# Patient Record
Sex: MALE | Race: WHITE | Employment: OTHER | URBAN - METROPOLITAN AREA
[De-identification: names, ages, dates, MRNs, and addresses within clinical notes are randomized per-mention and may not be internally consistent; named-entity substitution may affect disease eponyms.]

---

## 2017-02-14 ENCOUNTER — TRANSFERRED RECORDS (OUTPATIENT)
Dept: HEALTH INFORMATION MANAGEMENT | Facility: CLINIC | Age: 57
End: 2017-02-14

## 2017-02-15 ENCOUNTER — TRANSFERRED RECORDS (OUTPATIENT)
Dept: HEALTH INFORMATION MANAGEMENT | Facility: CLINIC | Age: 57
End: 2017-02-15

## 2017-02-16 ENCOUNTER — TRANSFERRED RECORDS (OUTPATIENT)
Dept: HEALTH INFORMATION MANAGEMENT | Facility: CLINIC | Age: 57
End: 2017-02-16

## 2017-02-16 ENCOUNTER — HOSPITAL ENCOUNTER (INPATIENT)
Facility: CLINIC | Age: 57
LOS: 3 days | Discharge: HOME | DRG: 442 | End: 2017-02-19
Attending: INTERNAL MEDICINE | Admitting: INTERNAL MEDICINE
Payer: MEDICARE

## 2017-02-16 DIAGNOSIS — K76.82 HEPATIC ENCEPHALOPATHY (H): Primary | ICD-10-CM

## 2017-02-16 PROCEDURE — A9270 NON-COVERED ITEM OR SERVICE: HCPCS | Mod: GY | Performed by: INTERNAL MEDICINE

## 2017-02-16 PROCEDURE — 99238 HOSP IP/OBS DSCHRG MGMT 30/<: CPT | Performed by: INTERNAL MEDICINE

## 2017-02-16 PROCEDURE — 21000000 ZZH R&B IMCU HEART CARE

## 2017-02-16 PROCEDURE — 40000915 ZZH STATISTIC SITTER, EVENING HOURS

## 2017-02-16 PROCEDURE — 25900017 H RX MED GY IP 259 OP 259 PS 637: Mod: GY | Performed by: INTERNAL MEDICINE

## 2017-02-16 PROCEDURE — 99223 1ST HOSP IP/OBS HIGH 75: CPT | Mod: AI | Performed by: INTERNAL MEDICINE

## 2017-02-16 PROCEDURE — 25000132 ZZH RX MED GY IP 250 OP 250 PS 637: Mod: GY | Performed by: INTERNAL MEDICINE

## 2017-02-16 RX ORDER — ONDANSETRON 2 MG/ML
4 INJECTION INTRAMUSCULAR; INTRAVENOUS EVERY 6 HOURS PRN
Status: DISCONTINUED | OUTPATIENT
Start: 2017-02-16 | End: 2017-02-19 | Stop reason: HOSPADM

## 2017-02-16 RX ORDER — NALOXONE HYDROCHLORIDE 0.4 MG/ML
.1-.4 INJECTION, SOLUTION INTRAMUSCULAR; INTRAVENOUS; SUBCUTANEOUS
Status: DISCONTINUED | OUTPATIENT
Start: 2017-02-16 | End: 2017-02-19 | Stop reason: HOSPADM

## 2017-02-16 RX ORDER — CLOTRIMAZOLE 1 %
CREAM (GRAM) TOPICAL 2 TIMES DAILY
Status: DISCONTINUED | OUTPATIENT
Start: 2017-02-16 | End: 2017-02-19 | Stop reason: HOSPADM

## 2017-02-16 RX ORDER — TRIAMTERENE CAPSULES 100 MG/1
100 CAPSULE ORAL DAILY
Status: DISCONTINUED | OUTPATIENT
Start: 2017-02-16 | End: 2017-02-16

## 2017-02-16 RX ORDER — POTASSIUM CHLORIDE 1500 MG/1
20 TABLET, EXTENDED RELEASE ORAL DAILY
Status: DISCONTINUED | OUTPATIENT
Start: 2017-02-16 | End: 2017-02-19 | Stop reason: HOSPADM

## 2017-02-16 RX ORDER — CEFTRIAXONE 1 G/1
1 INJECTION, POWDER, FOR SOLUTION INTRAMUSCULAR; INTRAVENOUS EVERY 24 HOURS
Status: DISCONTINUED | OUTPATIENT
Start: 2017-02-17 | End: 2017-02-19 | Stop reason: HOSPADM

## 2017-02-16 RX ORDER — BUMETANIDE 1 MG/1
1 TABLET ORAL 2 TIMES DAILY
Status: DISCONTINUED | OUTPATIENT
Start: 2017-02-16 | End: 2017-02-19 | Stop reason: HOSPADM

## 2017-02-16 RX ORDER — ESCITALOPRAM OXALATE 20 MG/1
20 TABLET ORAL DAILY
Status: DISCONTINUED | OUTPATIENT
Start: 2017-02-16 | End: 2017-02-19 | Stop reason: HOSPADM

## 2017-02-16 RX ORDER — LACTULOSE 10 G/15ML
20 SOLUTION ORAL 2 TIMES DAILY
COMMUNITY
End: 2020-03-04

## 2017-02-16 RX ORDER — AMILORIDE HYDROCHLORIDE 5 MG/1
10 TABLET ORAL 2 TIMES DAILY
Status: DISCONTINUED | OUTPATIENT
Start: 2017-02-16 | End: 2017-02-19 | Stop reason: HOSPADM

## 2017-02-16 RX ORDER — ESCITALOPRAM OXALATE 10 MG/1
10 TABLET ORAL DAILY
Status: DISCONTINUED | OUTPATIENT
Start: 2017-02-16 | End: 2017-02-16 | Stop reason: CLARIF

## 2017-02-16 RX ORDER — BUMETANIDE 1 MG/1
1 TABLET ORAL DAILY
Status: DISCONTINUED | OUTPATIENT
Start: 2017-02-16 | End: 2017-02-16 | Stop reason: CLARIF

## 2017-02-16 RX ORDER — ONDANSETRON 4 MG/1
4 TABLET, ORALLY DISINTEGRATING ORAL EVERY 6 HOURS PRN
Status: DISCONTINUED | OUTPATIENT
Start: 2017-02-16 | End: 2017-02-19 | Stop reason: HOSPADM

## 2017-02-16 RX ORDER — LACTULOSE 10 G/15ML
30 SOLUTION ORAL 4 TIMES DAILY
Status: DISCONTINUED | OUTPATIENT
Start: 2017-02-16 | End: 2017-02-19 | Stop reason: HOSPADM

## 2017-02-16 RX ADMIN — ESCITALOPRAM 20 MG: 20 TABLET, FILM COATED ORAL at 21:25

## 2017-02-16 RX ADMIN — POTASSIUM CHLORIDE 20 MEQ: 1500 TABLET, EXTENDED RELEASE ORAL at 21:25

## 2017-02-16 RX ADMIN — OMEPRAZOLE 20 MG: 20 CAPSULE, DELAYED RELEASE ORAL at 21:25

## 2017-02-16 RX ADMIN — LACTULOSE 30 G: 10 SOLUTION ORAL at 21:21

## 2017-02-16 RX ADMIN — BUMETANIDE 1 MG: 1 TABLET ORAL at 21:25

## 2017-02-16 ASSESSMENT — ACTIVITIES OF DAILY LIVING (ADL)
TOILETING: 0-->INDEPENDENT
SWALLOWING: 0-->SWALLOWS FOODS/LIQUIDS WITHOUT DIFFICULTY
FALL_HISTORY_WITHIN_LAST_SIX_MONTHS: NO
BATHING: 0-->INDEPENDENT
RETIRED_COMMUNICATION: 0-->UNDERSTANDS/COMMUNICATES WITHOUT DIFFICULTY
AMBULATION: 0-->INDEPENDENT
COGNITION: 0 - NO COGNITION ISSUES REPORTED
TRANSFERRING: 0-->INDEPENDENT
RETIRED_EATING: 0-->INDEPENDENT
DRESS: 0-->INDEPENDENT
NUMBER_OF_TIMES_PATIENT_HAS_FALLEN_WITHIN_LAST_SIX_MONTHS: 0

## 2017-02-16 NOTE — IP AVS SNAPSHOT
MRN:[id number]                      After Visit Summary   2/16/2017    Ravindra Fregoso    MRN: [id number]           Thank you!     Thank you for choosing [facility] for your care. Our goal is always to provide you with excellent care. Hearing back from our patients is one way we can continue to improve our services. Please take a few minutes to complete the written survey that you may receive in the mail after you visit with us. Thank you!        Patient Information     Date Of Birth          1960        About your hospital stay     You were admitted on:  February 16, 2017 You last received care in the:  [place] [address] [facility]    You were discharged on:  February 19, 2017        Reason for your hospital stay       Hepatic encephalopathy secondary to liver cirrhosis with mental status changes. Resolved with treatment.                  Who to Call     For medical emergencies, please call 911.  For non-urgent questions about your medical care, please call your primary care provider or clinic, [phone]          Attending Provider     Provider Specialty    Liban Whitaker MD Internal Medicine    [facility], Hammad GARRETT MD Internal Medicine       Primary Care Provider Office Phone # Fax #    Moo Flores [phone]       [facility] [address]   [place] [facility] [address]        After Care Instructions     Activity       Your activity upon discharge: activity as tolerated            Diet       Follow this diet upon discharge: Orders Placed This Encounter      2 Gram Sodium Diet            Monitor and record       weight every day                  Follow-up Appointments     Follow-up and recommended labs and tests        Follow up with primary care provider, MOO FLORES, within 7 days for hospital follow- up.  The following labs/tests are recommended: CBC,CMP,Ammonia.                  Your next 10 appointments already scheduled     Jun 12, 2017  7:00 AM CDT  "  Lab with  LAB   [facility] ([facility])    [address]  1st Floor  [facility] [id number] [phone]            2017  8:00 AM CDT   (Arrive by 7:45 AM)   Return General Liver with Rich Wong MD   [facility] ([facility])    [address]  3rd Floor  [facility] [id number] [phone]              Pending Results     No orders found from 2017 to 2017.            Statement of Approval     Ordered          17 1653  I have reviewed and agree with all the recommendations and orders detailed in this document.  EFFECTIVE NOW     Approved and electronically signed by:  Redd Vieyra MD             Admission Information     Date & Time Provider Department Dept. Phone    2017 Hammad Barber MD [place] [address] [facility] [phone]      Your Vitals Were     Blood Pressure Pulse Temperature Respirations Weight Pulse Oximetry    133/80 (BP Location: Left arm) 65 97.5  F (36.4  C) (Oral) 16 67.6 kg (149 lb 0.5 oz) 97%    BMI (Body Mass Index)                   25.57 kg/m2           MyChart Information     [employer] lets you send messages to your doctor, view your test results, renew your prescriptions, schedule appointments and more. To sign up, go to [email or web address] . Click on \"Log in\" on the left side of the screen, which will take you to the Welcome page. Then click on \"Sign up Now\" on the right side of the page.     You will be asked to enter the access code listed below, as well as some personal information. Please follow the directions to create your username and password.     Your access code is: [email or web address]  Expires: 2017  5:13 PM     Your access code will  in 90 days. If you need help or a new code, please call your [facility] or [phone].        Care EveryWhere ID     This is your Care EveryWhere ID. This could be used by other " organizations to access your [facility] medical records  [id number]           Review of your medicines      START taking        Dose / Directions    rifaximin 550 MG Tabs tablet   Commonly known as:  XIFAXAN   Indication:  Infection Within the Abdomen   Used for:  Hepatic encephalopathy (H)        Dose:  550 mg   Take 1 tablet (550 mg) by mouth 2 times daily   Quantity:  60 tablet   Refills:  0         CONTINUE these medicines which have NOT CHANGED        Dose / Directions    AMILORIDE HCL PO        Dose:  10 mg   Take 10 mg by mouth 2 times daily   Refills:  0       amoxicillin 500 MG capsule   Commonly known as:  AMOXIL   Used for:  Alcoholic cirrhosis of liver with ascites (H)        Dose:  2000 mg   Take 4 capsules (2,000 mg) by mouth See Admin Instructions   Quantity:  4 capsule   Refills:  5       BUMETANIDE PO        Dose:  1 mg   Take 1 mg by mouth 2 times daily   Refills:  0       ESCITALOPRAM OXALATE PO        Dose:  20 mg   Take 20 mg by mouth daily   Refills:  0       FOSAMAX 70 MG tablet   Generic drug:  alendronate        Dose:  70 mg   Take 70 mg by mouth every 7 days   Refills:  0       lactulose 10 GM/15ML solution   Commonly known as:  CHRONULAC        Dose:  20 g   Take 20 g by mouth 2 times daily   Refills:  0       MULTIVITAL Tabs        Dose:  1 tablet   Take 1 tablet by mouth   Quantity:  30 tablet   Refills:  0       omeprazole 20 MG CR capsule   Commonly known as:  priLOSEC   Used for:  Gastroesophageal reflux disease, esophagitis presence not specified        Dose:  20 mg   Take 1 capsule (20 mg) by mouth daily   Quantity:  90 capsule   Refills:  1       potassium chloride gillian er   Commonly known as:  K-DUR        Dose:  20 mEq   Take 20 mEq by mouth daily   Refills:  0            Where to get your medicines      These medications were sent to [id number] - [place], [place] - [address]  [address] [place], [place] [address] [address]     Phone:  [phone]     rifaximin 550 MG Tabs  tablet                Protect others around you: Learn how to safely use, store and throw away your medicines at www.disposemymeds.org.             Medication List: This is a list of all your medications and when to take them. Check marks below indicate your daily home schedule. Keep this list as a reference.      Medications           Morning Afternoon Evening Bedtime As Needed    AMILORIDE HCL PO   Take 10 mg by mouth 2 times daily   Last time this was given:  10 mg on 2/19/2017  8:34 AM                                amoxicillin 500 MG capsule   Commonly known as:  AMOXIL   Take 4 capsules (2,000 mg) by mouth See Admin Instructions                                BUMETANIDE PO   Take 1 mg by mouth 2 times daily   Last time this was given:  1 mg on 2/19/2017  8:34 AM                                ESCITALOPRAM OXALATE PO   Take 20 mg by mouth daily   Last time this was given:  20 mg on 2/19/2017  8:34 AM                                FOSAMAX 70 MG tablet   Take 70 mg by mouth every 7 days   Generic drug:  alendronate                                lactulose 10 GM/15ML solution   Commonly known as:  CHRONULAC   Take 20 g by mouth 2 times daily   Last time this was given:  30 g on 2/19/2017 12:39 PM                                MULTIVITAL Tabs   Take 1 tablet by mouth                                omeprazole 20 MG CR capsule   Commonly known as:  priLOSEC   Take 1 capsule (20 mg) by mouth daily   Last time this was given:  20 mg on 2/19/2017  8:33 AM                                potassium chloride gillian er   Commonly known as:  K-DUR   Take 20 mEq by mouth daily   Last time this was given:  20 mEq on 2/19/2017  8:34 AM                                rifaximin 550 MG Tabs tablet   Commonly known as:  XIFAXAN   Take 1 tablet (550 mg) by mouth 2 times daily   Last time this was given:  550 mg on 2/19/2017  8:34 AM

## 2017-02-16 NOTE — IP AVS SNAPSHOT
[place] [address] [facility]    [address] [place] [address]    Phone:  [phone]                                       After Visit Summary   2/16/2017    Ravindra Fregoso    MRN: [id number]           After Visit Summary Signature Page     I have received my discharge instructions, and my questions have been answered. I have discussed any challenges I see with this plan with the nurse or doctor.    ..........................................................................................................................................  Patient/Patient Representative Signature      ..........................................................................................................................................  Patient Representative Print Name and Relationship to Patient    ..................................................               ................................................  Date                                            Time    ..........................................................................................................................................  Reviewed by Signature/Title    ...................................................              ..............................................  Date                                                            Time

## 2017-02-17 LAB
ALBUMIN SERPL-MCNC: 3.2 G/DL (ref 3.4–5)
ALP SERPL-CCNC: 101 U/L (ref 40–150)
ALT SERPL W P-5'-P-CCNC: 35 U/L (ref 0–70)
AMMONIA PLAS-SCNC: 75 UMOL/L (ref 10–50)
ANION GAP SERPL CALCULATED.3IONS-SCNC: 10 MMOL/L (ref 3–14)
AST SERPL W P-5'-P-CCNC: 34 U/L (ref 0–45)
BILIRUB DIRECT SERPL-MCNC: 0.7 MG/DL (ref 0–0.2)
BILIRUB SERPL-MCNC: 2.1 MG/DL (ref 0.2–1.3)
BUN SERPL-MCNC: 23 MG/DL (ref 7–30)
C DIFF TOX B STL QL: NORMAL
CALCIUM SERPL-MCNC: 8.3 MG/DL (ref 8.5–10.1)
CHLORIDE SERPL-SCNC: 115 MMOL/L (ref 94–109)
CO2 SERPL-SCNC: 22 MMOL/L (ref 20–32)
CREAT SERPL-MCNC: 1.07 MG/DL (ref 0.66–1.25)
ERYTHROCYTE [DISTWIDTH] IN BLOOD BY AUTOMATED COUNT: 15 % (ref 10–15)
GFR SERPL CREATININE-BSD FRML MDRD: 71 ML/MIN/1.7M2
GLUCOSE SERPL-MCNC: 121 MG/DL (ref 70–99)
HCT VFR BLD AUTO: 35 % (ref 40–53)
HGB BLD-MCNC: 12.5 G/DL (ref 13.3–17.7)
INR PPP: 1.36 (ref 0.86–1.14)
MCH RBC QN AUTO: 34.5 PG (ref 26.5–33)
MCHC RBC AUTO-ENTMCNC: 35.7 G/DL (ref 31.5–36.5)
MCV RBC AUTO: 97 FL (ref 78–100)
PLATELET # BLD AUTO: 113 10E9/L (ref 150–450)
POTASSIUM SERPL-SCNC: 4.2 MMOL/L (ref 3.4–5.3)
PROT SERPL-MCNC: 7.2 G/DL (ref 6.8–8.8)
RBC # BLD AUTO: 3.62 10E12/L (ref 4.4–5.9)
SODIUM SERPL-SCNC: 147 MMOL/L (ref 133–144)
SPECIMEN SOURCE: NORMAL
WBC # BLD AUTO: 7.5 10E9/L (ref 4–11)

## 2017-02-17 PROCEDURE — 40000916 ZZH STATISTIC SITTER, NIGHT HOURS

## 2017-02-17 PROCEDURE — 25000132 ZZH RX MED GY IP 250 OP 250 PS 637: Mod: GY | Performed by: INTERNAL MEDICINE

## 2017-02-17 PROCEDURE — A9270 NON-COVERED ITEM OR SERVICE: HCPCS | Mod: GY | Performed by: INTERNAL MEDICINE

## 2017-02-17 PROCEDURE — 40000915 ZZH STATISTIC SITTER, EVENING HOURS

## 2017-02-17 PROCEDURE — 27210995 ZZH RX 272: Performed by: INTERNAL MEDICINE

## 2017-02-17 PROCEDURE — 40000914 ZZH STATISTIC SITTER, DAY HOURS

## 2017-02-17 PROCEDURE — 85610 PROTHROMBIN TIME: CPT | Performed by: INTERNAL MEDICINE

## 2017-02-17 PROCEDURE — 25000125 ZZHC RX 250: Performed by: INTERNAL MEDICINE

## 2017-02-17 PROCEDURE — 25900017 H RX MED GY IP 259 OP 259 PS 637: Mod: GY | Performed by: INTERNAL MEDICINE

## 2017-02-17 PROCEDURE — 25000128 H RX IP 250 OP 636: Performed by: HOSPITALIST

## 2017-02-17 PROCEDURE — 82140 ASSAY OF AMMONIA: CPT | Performed by: INTERNAL MEDICINE

## 2017-02-17 PROCEDURE — 80076 HEPATIC FUNCTION PANEL: CPT | Performed by: INTERNAL MEDICINE

## 2017-02-17 PROCEDURE — 99233 SBSQ HOSP IP/OBS HIGH 50: CPT | Performed by: INTERNAL MEDICINE

## 2017-02-17 PROCEDURE — 36415 COLL VENOUS BLD VENIPUNCTURE: CPT | Performed by: INTERNAL MEDICINE

## 2017-02-17 PROCEDURE — 85027 COMPLETE CBC AUTOMATED: CPT | Performed by: INTERNAL MEDICINE

## 2017-02-17 PROCEDURE — 12000000 ZZH R&B MED SURG/OB

## 2017-02-17 PROCEDURE — 25000128 H RX IP 250 OP 636: Performed by: INTERNAL MEDICINE

## 2017-02-17 PROCEDURE — 80048 BASIC METABOLIC PNL TOTAL CA: CPT | Performed by: INTERNAL MEDICINE

## 2017-02-17 PROCEDURE — 87493 C DIFF AMPLIFIED PROBE: CPT | Performed by: INTERNAL MEDICINE

## 2017-02-17 RX ORDER — LORAZEPAM 2 MG/ML
2 INJECTION INTRAMUSCULAR ONCE
Status: COMPLETED | OUTPATIENT
Start: 2017-02-17 | End: 2017-02-17

## 2017-02-17 RX ADMIN — BUMETANIDE 1 MG: 1 TABLET ORAL at 10:15

## 2017-02-17 RX ADMIN — CLOTRIMAZOLE: 1 CREAM TOPICAL at 22:41

## 2017-02-17 RX ADMIN — LACTULOSE 30 G: 10 SOLUTION ORAL at 13:00

## 2017-02-17 RX ADMIN — AMILORIDE HYDROCHLORIDE 10 MG: 5 TABLET ORAL at 12:44

## 2017-02-17 RX ADMIN — POTASSIUM CHLORIDE 20 MEQ: 1500 TABLET, EXTENDED RELEASE ORAL at 10:16

## 2017-02-17 RX ADMIN — LACTULOSE 30 G: 10 SOLUTION ORAL at 18:07

## 2017-02-17 RX ADMIN — LORAZEPAM 2 MG: 2 INJECTION INTRAMUSCULAR; INTRAVENOUS at 01:49

## 2017-02-17 RX ADMIN — LACTULOSE 30 G: 10 SOLUTION ORAL at 10:16

## 2017-02-17 RX ADMIN — CLOTRIMAZOLE: 1 CREAM TOPICAL at 10:16

## 2017-02-17 RX ADMIN — BUMETANIDE 1 MG: 1 TABLET ORAL at 20:48

## 2017-02-17 RX ADMIN — OMEPRAZOLE 20 MG: 20 CAPSULE, DELAYED RELEASE ORAL at 10:15

## 2017-02-17 RX ADMIN — LACTULOSE 30 G: 10 SOLUTION ORAL at 21:04

## 2017-02-17 RX ADMIN — AMILORIDE HYDROCHLORIDE 10 MG: 5 TABLET ORAL at 20:47

## 2017-02-17 RX ADMIN — ESCITALOPRAM 20 MG: 20 TABLET, FILM COATED ORAL at 10:15

## 2017-02-17 RX ADMIN — CEFTRIAXONE 1 G: 1 INJECTION, POWDER, FOR SOLUTION INTRAMUSCULAR; INTRAVENOUS at 12:33

## 2017-02-17 RX ADMIN — RIFAXIMIN 550 MG: 550 TABLET ORAL at 20:48

## 2017-02-17 NOTE — PROGRESS NOTES
[facility]    Hospitalist Progress Note    Date of Admission:  2/16/2017  Date of Service: 02/17/2017    Assessment & Plan   Ravindra Fregoso is a 56-year-old gentleman who presents with confusion, hepatic encephalopathy with elevated ammonia level who has a history of alcohol-related cirrhosis with varices, admitted now with decompensated alcoholic liver disease in the setting of hepatic encephalopathy, noncompliance with respect to his alcohol intake, transferred from [facility] to [facility] for further treatment.      Hepatic encephalopathy secondary to decompensated alcoholic liver cirrhosis with ascites  The patient is followed by Dr. Sidney Wong, hepatologist at [facility].  The patient recently was taken off his rifaximin due to stability of his encephalopathy.  * INR stable at 1.36  * Ammonia 126 at OSH -> 75  * Head CT at OSH neg  - Continue IV ceftriaxone for question of SBP, although appears less likely  - GI consult   - oral lactulose 4 times a day (rectal if needed)  - Consideration per GI if rifaximin should be started again  - Continue PTA amiloride and Bumex   - May transfer to medical floor  - Abdominal US    Alcohol dependency  Apparently drinks once every couple of weeks.  No current signs of withdrawal.  - Stressed importance of sobriety    Hypertension   - Continue PTA amiloride and Bumex    History of depression.    - Continue PTA Lexapro.     DVT Prophylaxis: Pneumatic Compression Devices  Code Status: Full Code  Disposition: Expected discharge in 1-3 days pending improvement in mentation    Hammad Barber MD  Hospitalist  [phone] (P)  Text Page (7 am to 6 pm)    Interval History   He has had 3 stools since admission while taking lactulose.  Still confused, but more awake now. 1:1 sitter in room.  -Data reviewed today: I reviewed all new labs and imaging results over the last 24 hours. I personally reviewed no images or EKG's today.    Physical Exam   /86 (BP Location:  Left arm)  Temp 97.2  F (36.2  C) (Axillary)  Resp 16  Wt 62.4 kg (137 lb 9.1 oz)  SpO2 96%  BMI 23.6 kg/m2  Constitutional: NAD, lethargic  HEENT: EOMI   Cardiovascular: S1, S2, regular rhythm  Respiratory: CTAB, no crackles  Gastrointestinal: Soft, mildly distended, non-tender  Neurologic: No focal deficits, oriented to general place and self only    Medications        omeprazole  20 mg Oral Daily     lactulose 200 g rectal enema  200 g Rectal Q4H     lactulose  30 g Oral 4x Daily     cefTRIAXone  1 g Intravenous Q24H     clotrimazole   Topical BID     aMILoride (MIDAMOR) tablet 10 mg  10 mg Oral BID     bumetanide (BUMEX) tablet 1 mg  1 mg Oral BID     escitalopram (LEXAPRO) tablet 20 mg  20 mg Oral Daily     potassium chloride gillian er  20 mEq Oral Daily       Data     Recent Labs  Lab 02/17/17  0515   WBC 7.5   HGB 12.5*   MCV 97   *   INR 1.36*   *   POTASSIUM 4.2   CHLORIDE 115*   CO2 22   BUN 23   CR 1.07   ANIONGAP 10   NADJA 8.3*   *   ALBUMIN 3.2*   PROTTOTAL 7.2   BILITOTAL 2.1*   ALKPHOS 101   ALT 35   AST 34     No results found for this or any previous visit (from the past 24 hour(s)).

## 2017-02-17 NOTE — PLAN OF CARE
Problem: Goal Outcome Summary  Goal: Goal Outcome Summary  Outcome: No Change  Pt transferred this evening from [facility] for AMS and ammonia level of 126. VSS. Tele shows SR. Pt denies any chest pain or SOB. He is alert to self only. MD to see pt and complete admission. Bed alarm on. Wife at bedside. Will continue to monitor pt.

## 2017-02-17 NOTE — H&P
DATE OF ADMISSION:  02/16/2017      PRIMARY CARE PROVIDER:  Moo Flores MD      PRIMARY GASTROENTEROLOGIST AND HEPATIC SPECIALIST:  Rich Wong MD       CHIEF COMPLAINT:  Confusion.      HISTORY:  Ravindra Fregoso is a 56-year-old  male with history of alcohol-related cirrhosis, ascites and prior history of hepatic encephalopathy who continues to consume alcohol, albeit intermittently, last one being about a week ago, who also has a history of grade 1 varices, portal hypertensive gastropathy, followed by hepatologist, Dr. Sidney Wong from the [facility], was at [facility] earlier today.  The patient has had progressively increasing ammonia levels over the last few days.  Two days ago his ammonia level was apparently 56.  His lactulose dose was increased.  Nonetheless, the patient presented with confusion at the hospital in [place] where his ammonia was 126.  The patient was seen by a Dr. Messer there.  He had a lactic acid of 1.8.  Urine tox was negative.  Alcohol level was negative.  Glucose is 153.  Lytes and LFTs were normal.  Total bilirubin was 1.6.  AST, ALT, alkaline phosphatase are all normal.  He had a normal amylase and lipase as well.  His white count was 7.5, hemoglobin 12.2.  INR was 1.22.  The patient is not on anticoagulation.  He had a head CT which showed only mild atrophy.  The patient was given 2 mg of Ativan for sedation and prior to his head CT he also received 25 grams of albumin as well as 1 gram of IV ceftriaxone for possible SBP.  He was able to be awake enough to drink his lactulose.  He presented to [facility] with no bowel movement today and confused with his wife at bedside.  The patient is unable to really provide much history.      The patient is followed by Dr. Sidney Wong.  He was last seen in 12/2016 when he was asked to continue his Bumex and triamterene and was tapered off of rifaximin due to cost and  overall had not had any recent encephalopathy.      PAST MEDICAL HISTORY:   1.  Alcohol-related cirrhosis with grade 1 varices and portal hypertensive gastropathy.   2.  Osteoporosis.   3.  Chronic pancreatitis with ongoing alcohol abuse.   4.  Depression.   5.  Has significant umbilical hernia.       PAST SURGICAL HISTORY:   1.  Tonsillectomy.     2.  Inguinal hernia surgery.      SOCIAL HISTORY:  He is .  He has 3 children.  Ongoing alcohol use intermittently.  Retired.  His wife accompanies him.      ALLERGIES:  Atorvastatin.      CURRENT MEDICATIONS:   1.  Lexapro 20 mg once daily.   2.  Fosamax 70 mg once a week.   3.  Bumex 1 mg b.i.d.   4.  Omeprazole 20 mg daily.   5.  Potassium chloride 10 mEq twice a day, take 2 tablets or 20 mg a day.   6.  Amlodipine 10 mg b.i.d.   7.  Amoxicillin 2 grams before dental procedures.   8.  Rifaximin was discontinued recently in 12/2016.   9.  Multivitamin.      REVIEW OF SYSTEMS:  A 10-point review of systems were not reviewed due to patient's encephalopathy.      PHYSICAL EXAMINATION:   VITAL SIGNS:  Temperature 98 degrees, heart rate 60, respirations 16, blood pressure 140/65, sats 98% on room air.   GENERAL:  The patient is a 56-year-old gentleman who is disheveled, encephalopathic and does not follow commands.   HEENT:  He has numerous cuts on his face.  Sclerae are anicteric.  Mucous membranes are tacky.  No jaundice.   NECK  No JVP elevation.   LUNGS:  Clear to auscultation.   CARDIOVASCULAR:  S1, S2, regular rate and rhythm.   ABDOMEN:  Distended.  He has an umbilical hernia.  He has ascites.  Did not have any tenderness to palpation.   EXTREMITIES:  Shows no cyanosis.  He does have numerous scratches and some erythematous areas over pressure points.  He has potential small ringworm above his knee on his right leg.   NEUROLOGIC:  He is encephalopathic and does not follow commands, but moves all 4 extremities.  Cranial nerves appear to be grossly intact.  He  does follow commands intermittently but overall somnolent.      ASSESSMENT:  Christin Mccord is a 56-year-old gentleman who presents with confusion, hepatic encephalopathy with elevated ammonia level who has a history of alcohol-related cirrhosis, admitted now with decompensated alcoholic liver disease in the setting of hepatic encephalopathy, noncompliance with respect to his alcohol intake being admitted for further treatment.      PLAN:   1.  Decompensated alcoholic-related liver cirrhosis.  The patient in addition to his ongoing alcohol use could have SBP.  The patient will be treated as such.  He received IV ceftriaxone.  We will continue this.  Will obtain a formal GI consultation.  With respect to his confusion, the patient will receive lactulose 200 mg every 4 hours rectally if the patient is unable to take oral lactulose 4 times a day.  The patient is followed by Dr. Sidney Wong who is a hepatologist at the [facility].  The patient recently was taken off his rifaximin due to stability of his encephalopathy.  However, this may need to be reinstituted again given his decompensation.  Will defer to our gastroenterologist or with his primary gastroenterologist.   2.  Alcoholic liver disease with ascites.  The patient will be continued on his Bumex once we have the dosage verified as well as potassium chloride.  He was previously on triamterene.  Will see if he was continued on that once we get his home medication list.   3.  Hypertension.  The patient will be continued on his amlodipine.   4.  History of depression.  Will continue patient on Lexapro.   5.  DVT prophylaxis:  The patient will have compression boots.      CODE STATUS:  Full.         KATHY CAVANAUGH MD             D: 2017 20:40   T: 2017 23:43   MT: mg      Name:     CHRISTIN MCCORD   MRN:      [id number]        Account:      [id number]   :      1960           Admitted:     [id number]       Document: [id number]       cc: Rich Flores MD

## 2017-02-17 NOTE — PROVIDER NOTIFICATION
MD Notification    Notified Person:  MD    Notified Persons Name:    Notification Date/Time:0145 2/17/17    Notification Interaction:  Talked with Physician    Purpose of Notification: Pt with increasing restlessness, pulling at lines, and mild agitation. Requesting ativan.    Orders Received: 2mg ativan IV one time dose received.    Comments:

## 2017-02-17 NOTE — PROVIDER NOTIFICATION
MD Notification    Notified Person:  MD    Notified Persons Name: Vivian    Notification Date/Time:2/17/17 8466    Notification Interaction:  Talked with Physician    Purpose of Notification: Pt on lactulose and has not had a BM.    Orders Received: Continue to monitor. If no BM by 0900; ok to give lactulose enema.    Comments:

## 2017-02-17 NOTE — PHARMACY-ADMISSION MEDICATION HISTORY
Admission Medication History    Admission medication history interview status for the 2/16/2017 admission is complete. See EPIC admission navigator for prior to admission medications     Medication history source reliability:Good,  Spouse provided med list over the telephone    Actions taken by pharmacist (provider contacted, etc): Contacted spouse by phone      Additional medication history information not noted on PTA med list :None    Medication reconciliation/reorder completed by provider prior to medication history? No    Time spent in this activity: 15 minutes    Prior to Admission medications    Medication Sig Last Dose Taking? Auth Provider   AMILORIDE HCL PO Take 10 mg by mouth 2 times daily 2/15/2017 at Unknown time Yes Unknown, Entered By History   BUMETANIDE PO Take 1 mg by mouth 2 times daily 2/15/2017 at Unknown time Yes Unknown, Entered By History   ESCITALOPRAM OXALATE PO Take 20 mg by mouth daily 2/15/2017 at Unknown time Yes Unknown, Entered By History   lactulose (CHRONULAC) 10 GM/15ML solution Take 20 g by mouth 2 times daily 2/15/2017 at Unknown time Yes Unknown, Entered By History   potassium chloride gillian er (K-DUR) Take 20 mEq by mouth daily 2/15/2017 at Unknown time Yes Unknown, Entered By History   alendronate (FOSAMAX) 70 MG tablet Take 70 mg by mouth every 7 days 2/13/2017 at Monday Yes Reported, Patient   omeprazole (PRILOSEC) 20 MG capsule Take 1 capsule (20 mg) by mouth daily 2/15/2017 at Unknown time Yes Rich Haque MD   Multiple Vitamins-Minerals (MULTIVITAL) TABS Take 1 tablet by mouth 2/15/2017 at Unknown time Yes Rich Haque MD   amoxicillin (AMOXIL) 500 MG capsule Take 4 capsules (2,000 mg) by mouth See Admin Instructions  at [facility] Hinojosa NP David S. Cline, PharmD

## 2017-02-17 NOTE — PLAN OF CARE
Problem: Goal Outcome Summary  Goal: Goal Outcome Summary  Outcome: No Change  VSS. No c/o pain or nonverbal indicators of pain. Pt confused and lethargic/mildly obtunded throughout night. Pt extremely restless at times, pulling at lines, and attempting to remove his gown and tele monitor. Pt given prn ativan with some relief; pt calmed down but became restless again an hour later. Pt removed own IV. New Iv placed without difficulty. Pt received lactulose dose and has yet to have BM; MD notified will continue to monitor. Tele SR. Pt with likely ringworm on rt inner thigh. LS clear with sats >90% on RA.

## 2017-02-18 ENCOUNTER — APPOINTMENT (OUTPATIENT)
Dept: ULTRASOUND IMAGING | Facility: CLINIC | Age: 57
DRG: 442 | End: 2017-02-18
Attending: INTERNAL MEDICINE
Payer: MEDICARE

## 2017-02-18 LAB
ALBUMIN SERPL-MCNC: 3.3 G/DL (ref 3.4–5)
ALP SERPL-CCNC: 104 U/L (ref 40–150)
ALT SERPL W P-5'-P-CCNC: 37 U/L (ref 0–70)
AMMONIA PLAS-SCNC: 47 UMOL/L (ref 10–50)
ANION GAP SERPL CALCULATED.3IONS-SCNC: 11 MMOL/L (ref 3–14)
AST SERPL W P-5'-P-CCNC: 38 U/L (ref 0–45)
BILIRUB DIRECT SERPL-MCNC: 0.8 MG/DL (ref 0–0.2)
BILIRUB SERPL-MCNC: 2.5 MG/DL (ref 0.2–1.3)
BUN SERPL-MCNC: 25 MG/DL (ref 7–30)
CALCIUM SERPL-MCNC: 8.4 MG/DL (ref 8.5–10.1)
CHLORIDE SERPL-SCNC: 105 MMOL/L (ref 94–109)
CO2 SERPL-SCNC: 21 MMOL/L (ref 20–32)
CREAT SERPL-MCNC: 1.12 MG/DL (ref 0.66–1.25)
ERYTHROCYTE [DISTWIDTH] IN BLOOD BY AUTOMATED COUNT: 14.6 % (ref 10–15)
GFR SERPL CREATININE-BSD FRML MDRD: 67 ML/MIN/1.7M2
GLUCOSE SERPL-MCNC: 125 MG/DL (ref 70–99)
HCT VFR BLD AUTO: 36 % (ref 40–53)
HGB BLD-MCNC: 12.8 G/DL (ref 13.3–17.7)
INR PPP: 1.36 (ref 0.86–1.14)
MCH RBC QN AUTO: 34 PG (ref 26.5–33)
MCHC RBC AUTO-ENTMCNC: 35.6 G/DL (ref 31.5–36.5)
MCV RBC AUTO: 96 FL (ref 78–100)
PLATELET # BLD AUTO: 106 10E9/L (ref 150–450)
POTASSIUM SERPL-SCNC: 3.4 MMOL/L (ref 3.4–5.3)
PROT SERPL-MCNC: 7.4 G/DL (ref 6.8–8.8)
RBC # BLD AUTO: 3.76 10E12/L (ref 4.4–5.9)
SODIUM SERPL-SCNC: 137 MMOL/L (ref 133–144)
WBC # BLD AUTO: 7.1 10E9/L (ref 4–11)

## 2017-02-18 PROCEDURE — 80053 COMPREHEN METABOLIC PANEL: CPT | Performed by: INTERNAL MEDICINE

## 2017-02-18 PROCEDURE — 25000128 H RX IP 250 OP 636: Performed by: INTERNAL MEDICINE

## 2017-02-18 PROCEDURE — 36415 COLL VENOUS BLD VENIPUNCTURE: CPT | Performed by: INTERNAL MEDICINE

## 2017-02-18 PROCEDURE — A9270 NON-COVERED ITEM OR SERVICE: HCPCS | Mod: GY | Performed by: INTERNAL MEDICINE

## 2017-02-18 PROCEDURE — 85610 PROTHROMBIN TIME: CPT | Performed by: INTERNAL MEDICINE

## 2017-02-18 PROCEDURE — 25900017 H RX MED GY IP 259 OP 259 PS 637: Mod: GY | Performed by: INTERNAL MEDICINE

## 2017-02-18 PROCEDURE — 82140 ASSAY OF AMMONIA: CPT | Performed by: INTERNAL MEDICINE

## 2017-02-18 PROCEDURE — 25000132 ZZH RX MED GY IP 250 OP 250 PS 637: Mod: GY | Performed by: INTERNAL MEDICINE

## 2017-02-18 PROCEDURE — 76700 US EXAM ABDOM COMPLETE: CPT

## 2017-02-18 PROCEDURE — 85027 COMPLETE CBC AUTOMATED: CPT | Performed by: INTERNAL MEDICINE

## 2017-02-18 PROCEDURE — 99232 SBSQ HOSP IP/OBS MODERATE 35: CPT | Performed by: INTERNAL MEDICINE

## 2017-02-18 PROCEDURE — 82248 BILIRUBIN DIRECT: CPT | Performed by: INTERNAL MEDICINE

## 2017-02-18 PROCEDURE — 99207 ZZC MOONLIGHTING INDICATOR: CPT | Performed by: INTERNAL MEDICINE

## 2017-02-18 PROCEDURE — 12000000 ZZH R&B MED SURG/OB

## 2017-02-18 RX ADMIN — RIFAXIMIN 550 MG: 550 TABLET ORAL at 20:00

## 2017-02-18 RX ADMIN — CEFTRIAXONE 1 G: 1 INJECTION, POWDER, FOR SOLUTION INTRAMUSCULAR; INTRAVENOUS at 11:50

## 2017-02-18 RX ADMIN — AMILORIDE HYDROCHLORIDE 10 MG: 5 TABLET ORAL at 09:25

## 2017-02-18 RX ADMIN — CLOTRIMAZOLE: 1 CREAM TOPICAL at 20:03

## 2017-02-18 RX ADMIN — LACTULOSE 30 G: 10 SOLUTION ORAL at 09:24

## 2017-02-18 RX ADMIN — POTASSIUM CHLORIDE 20 MEQ: 1500 TABLET, EXTENDED RELEASE ORAL at 09:24

## 2017-02-18 RX ADMIN — RIFAXIMIN 550 MG: 550 TABLET ORAL at 09:24

## 2017-02-18 RX ADMIN — LACTULOSE 30 G: 10 SOLUTION ORAL at 20:03

## 2017-02-18 RX ADMIN — OMEPRAZOLE 20 MG: 20 CAPSULE, DELAYED RELEASE ORAL at 09:25

## 2017-02-18 RX ADMIN — LACTULOSE 30 G: 10 SOLUTION ORAL at 12:50

## 2017-02-18 RX ADMIN — LACTULOSE 30 G: 10 SOLUTION ORAL at 17:26

## 2017-02-18 RX ADMIN — AMILORIDE HYDROCHLORIDE 10 MG: 5 TABLET ORAL at 20:00

## 2017-02-18 RX ADMIN — CLOTRIMAZOLE: 1 CREAM TOPICAL at 11:50

## 2017-02-18 RX ADMIN — BUMETANIDE 1 MG: 1 TABLET ORAL at 20:00

## 2017-02-18 RX ADMIN — ESCITALOPRAM 20 MG: 20 TABLET, FILM COATED ORAL at 09:24

## 2017-02-18 RX ADMIN — BUMETANIDE 1 MG: 1 TABLET ORAL at 09:24

## 2017-02-18 NOTE — PROGRESS NOTES
[facility]  Hospitalist Progress Note          Assessment and Plan:   Ravindra Fregoso is a 56-year-old gentleman who presents with confusion, hepatic encephalopathy with elevated ammonia level who has a history of alcohol-related cirrhosis with varices, admitted  with decompensated alcoholic liver disease in the setting of hepatic encephalopathy, noncompliance with respect to his alcohol intake, transferred from [facility] to [facility] for further treatment.       Hepatic encephalopathy secondary to decompensated alcoholic liver cirrhosis with ascites-   Less confused this am , Ammonia 47 this am  The patient is followed by Dr. Sidney Wong, hepatologist at [facility]. The patient recently was taken off his rifaximin due to stability of his encephalopathy. appreciate GI input ,.  Continue - oral lactulose 4 times a day and has been started on Rifaximin per GI  - Continue amiloride and Bumex   -- Abdominal US- negative for ascitics   -continue 2 gm sodium diet,restricted protein diet.,rpt labs in am     Alcohol dependency   No current signs of withdrawal.     Hypertension   - Continue amiloride and Bumex     History of depression.   - Continue Lexapro.      DVT Prophylaxis: Pneumatic Compression Devices  Code Status: Full Code  Disposition: Expected discharge in 1-3 days pending improvement in mentation                Interval History:   Pt sitting up in bed, more awake, less confused this am . No n/v, no cp or sob, no abd pain, no fever/chills.               Medications:       rifaximin  550 mg Oral BID     omeprazole  20 mg Oral Daily     lactulose  30 g Oral 4x Daily     cefTRIAXone  1 g Intravenous Q24H     clotrimazole   Topical BID     aMILoride (MIDAMOR) tablet 10 mg  10 mg Oral BID     bumetanide (BUMEX) tablet 1 mg  1 mg Oral BID     escitalopram (LEXAPRO) tablet 20 mg  20 mg Oral Daily     potassium chloride gillian er  20 mEq Oral Daily     lactulose 200 g rectal enema, naloxone, ondansetron **OR**  ondansetron               Physical Exam:   Blood pressure 124/68, temperature 97.8  F (36.6  C), temperature source Oral, resp. rate 16, weight 65.8 kg (145 lb 1 oz), SpO2 96 %.  Wt Readings from Last 4 Encounters:   17 65.8 kg (145 lb 1 oz)   16 65.7 kg (144 lb 12.8 oz)   16 64.1 kg (141 lb 6.4 oz)   16 62.1 kg (136 lb 12.8 oz)         Vital Sign Ranges  Temperature Temp  Av.1  F (36.7  C)  Min: 97.8  F (36.6  C)  Max: 98.6  F (37  C)   Blood pressure Systolic (24hrs), Av , Min:124 , Max:154        Diastolic (24hrs), Av, Min:68, Max:86      Pulse No Data Recorded   Respirations Resp  Av  Min: 16  Max: 20   Pulse oximetry SpO2  Av.8 %  Min: 93 %  Max: 96 %         Intake/Output Summary (Last 24 hours) at 17 1119  Last data filed at 17 0411   Gross per 24 hour   Intake              960 ml   Output              880 ml   Net               80 ml       Constitutional: Awake, less confused ,no apparent distress   Lungs: Clear to auscultation bilaterally, no crackles or wheezing   Cardiovascular: Regular rate and rhythm, normal S1 and S2, and no murmur noted   Abdomen: Normal bowel sounds, soft, non-distended, non-tender   MS;  no edema.no calf tenderness   Other:           Data:     Recent Labs   Lab Test  17   1021  17   0515  16   0702   WBC  7.1  7.5  5.8   HGB  12.8*  12.5*  11.4*   MCV  96  97  97   PLT  106*  113*  104*   INR  1.36*  1.36*  1.38*      Recent Labs   Lab Test  17   1021  17   0515  16   0702   NA  137  147*  143   POTASSIUM  3.4  4.2  3.5   CHLORIDE  105  115*  110*   CO2  21  22  25   BUN  25  23  11   CR  1.12  1.07  0.91   ANIONGAP  11  10  8   NADJA  8.4*  8.3*  7.3*   GLC  125*  121*  110*

## 2017-02-18 NOTE — PROGRESS NOTES
[facility]  Gastroenterology Progress Note     Ravindra Fregoso MRN# [id number]   YOB: 1960 Age: 56 year old          Assessment and Plan:     Hepatic encephalopathy (H)    More awake and alert today. Still confused.   Actively drinking till hospital admission.  U/S abd negative for Ascites.  Patient has been drinking protein shakes and was off lactulose.  D/W patients wife in detail. Will recommend 1.5 gm/KG diet in divided doses. Avoid high protein load and continue on lactulose and also xifaxan.  Continue to follow with Hepatology at [facility].  Advance diet to regular 2 gm NA and 1- 1.5 gm/K protein diet  Repeat labs tomorrow.           Data Unavailable      Interval History:   doing well; no cp, sob, n/v/d, or abd pain.              Review of Systems:   C: NEGATIVE for fever, chills, change in weight  E/M: NEGATIVE for ear, mouth and throat problems  R: NEGATIVE for significant cough or SOB  CV: NEGATIVE for chest pain, palpitations or peripheral edema             Medications:   I have reviewed this patient's current medications    rifaximin  550 mg Oral BID     omeprazole  20 mg Oral Daily     lactulose  30 g Oral 4x Daily     cefTRIAXone  1 g Intravenous Q24H     clotrimazole   Topical BID     aMILoride (MIDAMOR) tablet 10 mg  10 mg Oral BID     bumetanide (BUMEX) tablet 1 mg  1 mg Oral BID     escitalopram (LEXAPRO) tablet 20 mg  20 mg Oral Daily     potassium chloride gillian er  20 mEq Oral Daily                  Physical Exam:   Vitals were reviewed  Vital Signs with Ranges  Temp:  [97.2  F (36.2  C)-98.6  F (37  C)] 97.8  F (36.6  C)  Heart Rate:  [57-73] 57  Resp:  [16-20] 16  BP: (124-154)/(68-86) 124/68  SpO2:  [93 %-96 %] 96 %  I/O last 3 completed shifts:  In: 960 [P.O.:960]  Out: 1130 [Urine:1130]  Constitutional: healthy, alert and no distress   Cardiovascular: negative, PMI normal. No lifts, heaves, or thrills. RRR. No murmurs, clicks gallops or rub  Respiratory: negative,  Percussion normal. Good diaphragmatic excursion. Lungs clear  Head: Normocephalic. No masses, lesions, tenderness or abnormalities  Neck: Neck supple. No adenopathy. Thyroid symmetric, normal size,, Carotids without bruits.  Abdomen: Abdomen soft, non-tender. BS normal. No masses, organomegaly  NEURO: Gait normal. Reflexes normal and symmetric. Sensation grossly WNL. No Asterixis, still tremors.  SKIN: no suspicious lesions or rashes           Data:   I reviewed the patient's new clinical lab test results.   Recent Labs   Lab Test  02/18/17   1021  02/17/17   0515  12/05/16   0702  06/06/16   0646   WBC  7.1  7.5  5.8  5.6   HGB  12.8*  12.5*  11.4*  11.7*   MCV  96  97  97  102*   PLT  106*  113*  104*  96*   INR   --   1.36*  1.38*  1.56*     Recent Labs   Lab Test  02/17/17   0515  12/05/16   0702  06/06/16   0646   POTASSIUM  4.2  3.5  4.3   CHLORIDE  115*  110*  107   CO2  22  25  28   BUN  23  11  9   ANIONGAP  10  8  7     Recent Labs   Lab Test  02/17/17   0515  12/05/16   0702  06/06/16   0646   08/29/12   0300   ALBUMIN  3.2*  2.3*  2.4*   < >   --    BILITOTAL  2.1*  1.2  1.9*   < >   --    ALT  35  27  37   < >   --    AST  34  35  52*   < >   --    PROTEIN   --    --    --    --   30*    < > = values in this interval not displayed.       I reviewed the patient's new imaging results.    All laboratory data reviewed  All imaging studies reviewed by me.    José Manuel Huntley MD,  2/18/2017  [facility]  Office : [phone]  Cell: [phone]

## 2017-02-18 NOTE — CONSULTS
Hepatic encephalopathy.  H/O MELD of 14 in Dec. Patient was off xifaxan since dec. MELD 13 now.  Change in mental status.   Acute grade 2-3 encephalopathy.  Labs stable.  Positive ascites.  A/P Paracentesis tomorrow to r/o SBP.  Agree with lactulose.  Start on Xifiaxan.  2 gm Na diet.     [facility]  [phone]

## 2017-02-18 NOTE — PLAN OF CARE
Problem: Goal Outcome Summary  Goal: Goal Outcome Summary  Outcome: Improving  Patient alert/orient X3/forgetful. Lungs diminished, on RA. Lactulose given, patient having loose stools. Neuro's intact, tele NSR, Had abdominal US this am, no new orders. Tolerating 2gm na diet,Up with sba/belt, walked hallways X2.  Vss, denied pain.

## 2017-02-18 NOTE — PROGRESS NOTES
Patient alert/orient X2-3/forgetful.  Lungs diminished, on RA.  Lactulose given, patient having loose stools.  Neuro's intact, tele NSR, Having abdominal US in am, NPO after midnight.  Sitter at bedside for confusion, pulling at lines.  Up with sba/belt.  Unsteady.

## 2017-02-18 NOTE — PLAN OF CARE
Problem: Goal Outcome Summary  Goal: Goal Outcome Summary  Outcome: No Change  Pt A&O x1, forgetful. VSS, RA. Tele NSR. Lactulose given, 4 BM's in last 12hrs. Neuro's intact. NPO after midnight. US abd this AM. Sitter at bedside for confusion. Up w/ 1 assist. C-dif negative. Will cont to monitor.

## 2017-02-18 NOTE — PLAN OF CARE
Problem: Goal Outcome Summary  Goal: Goal Outcome Summary  Pt has been pain free with V/signs stable and maintaining his O2 sats in the mid 90's on Rm air. He was able to state his name,place & birthday clearly especially after oral care was given this morning but lethargic and sleepy. Today's Ammonia level was 75 and Pt was able to take his Lactulose orally and had several liquids stools  Restless at times plus impulsive so sitter at bedside. Wife at bedside and  was updated during the day. Pt will have US of ABD tomorrow. Pt is ready for transferred to [facility] and report was given to RN. Remains in NSR

## 2017-02-19 VITALS
SYSTOLIC BLOOD PRESSURE: 133 MMHG | HEART RATE: 65 BPM | TEMPERATURE: 97.5 F | BODY MASS INDEX: 25.57 KG/M2 | OXYGEN SATURATION: 97 % | RESPIRATION RATE: 16 BRPM | DIASTOLIC BLOOD PRESSURE: 80 MMHG | WEIGHT: 149.03 LBS

## 2017-02-19 LAB
ALBUMIN SERPL-MCNC: 3 G/DL (ref 3.4–5)
ALP SERPL-CCNC: 95 U/L (ref 40–150)
ALT SERPL W P-5'-P-CCNC: 32 U/L (ref 0–70)
ANION GAP SERPL CALCULATED.3IONS-SCNC: 11 MMOL/L (ref 3–14)
AST SERPL W P-5'-P-CCNC: 36 U/L (ref 0–45)
BILIRUB SERPL-MCNC: 1.2 MG/DL (ref 0.2–1.3)
BUN SERPL-MCNC: 22 MG/DL (ref 7–30)
CALCIUM SERPL-MCNC: 8 MG/DL (ref 8.5–10.1)
CHLORIDE SERPL-SCNC: 107 MMOL/L (ref 94–109)
CO2 SERPL-SCNC: 21 MMOL/L (ref 20–32)
CREAT SERPL-MCNC: 0.98 MG/DL (ref 0.66–1.25)
ERYTHROCYTE [DISTWIDTH] IN BLOOD BY AUTOMATED COUNT: 14.3 % (ref 10–15)
GFR SERPL CREATININE-BSD FRML MDRD: 78 ML/MIN/1.7M2
GLUCOSE SERPL-MCNC: 118 MG/DL (ref 70–99)
HCT VFR BLD AUTO: 34.2 % (ref 40–53)
HGB BLD-MCNC: 12.1 G/DL (ref 13.3–17.7)
MCH RBC QN AUTO: 33.7 PG (ref 26.5–33)
MCHC RBC AUTO-ENTMCNC: 35.4 G/DL (ref 31.5–36.5)
MCV RBC AUTO: 95 FL (ref 78–100)
PLATELET # BLD AUTO: 110 10E9/L (ref 150–450)
POTASSIUM SERPL-SCNC: 3.5 MMOL/L (ref 3.4–5.3)
PROT SERPL-MCNC: 6.9 G/DL (ref 6.8–8.8)
RBC # BLD AUTO: 3.59 10E12/L (ref 4.4–5.9)
SODIUM SERPL-SCNC: 139 MMOL/L (ref 133–144)
WBC # BLD AUTO: 6.6 10E9/L (ref 4–11)

## 2017-02-19 PROCEDURE — A9270 NON-COVERED ITEM OR SERVICE: HCPCS | Mod: GY | Performed by: INTERNAL MEDICINE

## 2017-02-19 PROCEDURE — 80053 COMPREHEN METABOLIC PANEL: CPT | Performed by: INTERNAL MEDICINE

## 2017-02-19 PROCEDURE — 99232 SBSQ HOSP IP/OBS MODERATE 35: CPT | Performed by: INTERNAL MEDICINE

## 2017-02-19 PROCEDURE — 36415 COLL VENOUS BLD VENIPUNCTURE: CPT | Performed by: INTERNAL MEDICINE

## 2017-02-19 PROCEDURE — 25000132 ZZH RX MED GY IP 250 OP 250 PS 637: Mod: GY | Performed by: INTERNAL MEDICINE

## 2017-02-19 PROCEDURE — 85027 COMPLETE CBC AUTOMATED: CPT | Performed by: INTERNAL MEDICINE

## 2017-02-19 PROCEDURE — 25900017 H RX MED GY IP 259 OP 259 PS 637: Mod: GY | Performed by: INTERNAL MEDICINE

## 2017-02-19 PROCEDURE — 25000128 H RX IP 250 OP 636: Performed by: INTERNAL MEDICINE

## 2017-02-19 RX ADMIN — AMILORIDE HYDROCHLORIDE 10 MG: 5 TABLET ORAL at 08:34

## 2017-02-19 RX ADMIN — LACTULOSE 30 G: 10 SOLUTION ORAL at 08:33

## 2017-02-19 RX ADMIN — POTASSIUM CHLORIDE 20 MEQ: 1500 TABLET, EXTENDED RELEASE ORAL at 08:34

## 2017-02-19 RX ADMIN — RIFAXIMIN 550 MG: 550 TABLET ORAL at 08:34

## 2017-02-19 RX ADMIN — ESCITALOPRAM 20 MG: 20 TABLET, FILM COATED ORAL at 08:34

## 2017-02-19 RX ADMIN — LACTULOSE 30 G: 10 SOLUTION ORAL at 12:39

## 2017-02-19 RX ADMIN — CEFTRIAXONE 1 G: 1 INJECTION, POWDER, FOR SOLUTION INTRAMUSCULAR; INTRAVENOUS at 12:39

## 2017-02-19 RX ADMIN — CLOTRIMAZOLE: 1 CREAM TOPICAL at 09:18

## 2017-02-19 RX ADMIN — BUMETANIDE 1 MG: 1 TABLET ORAL at 08:34

## 2017-02-19 RX ADMIN — OMEPRAZOLE 20 MG: 20 CAPSULE, DELAYED RELEASE ORAL at 08:33

## 2017-02-19 RX ADMIN — RIFAXIMIN 550 MG: 550 TABLET ORAL at 17:29

## 2017-02-19 NOTE — PLAN OF CARE
Problem: Goal Outcome Summary  Goal: Goal Outcome Summary  Outcome: No Change  Pt A&O x2, forgetful. VSS, RA. Tele NSR. Lactulose given, frequent loose BMs. Neuro's intact. US abd was done yesterday, no new orders. Up w/ 1 assist.

## 2017-02-19 NOTE — PLAN OF CARE
Problem: Goal Outcome Summary  Goal: Goal Outcome Summary  Outcome: Adequate for Discharge Date Met:  02/19/17  A&Ox4.  VSS.  Up with SBA, steady.  Reviewed discharge paperwork.  Verbalized understanding.  Wife at bedside, transporting to home.

## 2017-02-19 NOTE — PLAN OF CARE
Problem: Goal Outcome Summary  Goal: Goal Outcome Summary  Outcome: Improving  A & O.  Tele was sinus dysrhythmia, other VSS.  Neuros intact.  Up w/ SBA.  Ambulated in the muniz.  2gm Na diet, tolerating well.  Will continue to monitor.

## 2017-02-19 NOTE — CONSULTS
[facility]  Gastroenterology Consultation         Ravindra Fregoso  [address] [facility] [address]  56 year old male    Admission Date/Time: 2/16/2017  Primary Care Provider: Moo Flores  Referring / Attending Physician:  Julianne    We were asked to see the patient in consultation by Dr. Whitaker for evaluation of change in mental status and elevated ammonia,encephlopathy..      CC: Encephalopathy.    HPI:  Ravindra Fregoso is a 56 year old male who was transferred from [facility] due to change in mental status.Patient is still confused and drowsy. Patient has known h/o ETOH Cirrhosis and portal HTN, Encephalopathy. Last MELD was 14 in Dec. Patient was taken off xifaxan due to cost and clinically stability.  Patient has been actively drinking and also was off lactulose. Patient has been drinking protein shakes.  No H/O fever, chills, nausea, vomiting. Patient had h/o ascites and is on diuretics.  the patient presented with confusion at the hospital in [place] where his ammonia was 126. The patient was seen by a Dr. Messer there. He had a lactic acid of 1.8. Urine tox was negative. Alcohol level was negative. Glucose is 153. Lytes and LFTs were normal. Total bilirubin was 1.6. AST, ALT, alkaline phosphatase are all normal. He had a normal amylase and lipase as well. His white count was 7.5, hemoglobin 12.2. INR was 1.22. The patient is not on anticoagulation. He had a head CT which showed only mild atrophy. The patient was given 2 mg of Ativan for sedation and prior to his head CT he also received 25 grams of albumin as well as 1 gram of IV ceftriaxone for possible SBP.    ROS: A comprehensive ten point review of systems was negative aside from those in mentioned in the HPI.      PAST MED HX:  I have reviewed this patient's medical history and updated it with pertinent information if needed.   Past Medical History   Diagnosis Date     Cirrhosis of liver (H)      ETOH      Depression      Osteoporosis      Polyps, colonic        MEDICATIONS:   Prior to Admission Medications   Prescriptions Last Dose Informant Patient Reported? Taking?   AMILORIDE HCL PO 2/15/2017 at Unknown time Spouse/Significant Other Yes Yes   Sig: Take 10 mg by mouth 2 times daily   BUMETANIDE PO 2/15/2017 at Unknown time Spouse/Significant Other Yes Yes   Sig: Take 1 mg by mouth 2 times daily   ESCITALOPRAM OXALATE PO 2/15/2017 at Unknown time Spouse/Significant Other Yes Yes   Sig: Take 20 mg by mouth daily   Multiple Vitamins-Minerals (MULTIVITAL) TABS 2/15/2017 at Unknown time Spouse/Significant Other Yes Yes   Sig: Take 1 tablet by mouth   alendronate (FOSAMAX) 70 MG tablet 2/13/2017 at Monday Spouse/Significant Other Yes Yes   Sig: Take 70 mg by mouth every 7 days   amoxicillin (AMOXIL) 500 MG capsule  at PRN Spouse/Significant Other No No   Sig: Take 4 capsules (2,000 mg) by mouth See Admin Instructions   lactulose (CHRONULAC) 10 GM/15ML solution 2/15/2017 at Unknown time Spouse/Significant Other Yes Yes   Sig: Take 20 g by mouth 2 times daily   omeprazole (PRILOSEC) 20 MG capsule 2/15/2017 at Unknown time Spouse/Significant Other No Yes   Sig: Take 1 capsule (20 mg) by mouth daily   potassium chloride gillian er (K-DUR) 2/15/2017 at Unknown time Spouse/Significant Other Yes Yes   Sig: Take 20 mEq by mouth daily      Facility-Administered Medications: None       ALLERGIES:   Allergies   Allergen Reactions     Atorvastatin Calcium      No Clinical Screening - See Comments      Platelets ( infusion)       SOCIAL HISTORY:  Social History   Substance Use Topics     Smoking status: Current Every Day Smoker     Packs/day: 0.20     Types: Cigarettes     Smokeless tobacco: Never Used      Comment: started at age 13, as much as 1 ppd, no 4-5 cigs/day     Alcohol use 0.0 oz/week     0 Standard drinks or equivalent per week      Comment: Last drink ~Feb. 2016, 1-2 drinks at special occasions like weddings       FAMILY  HISTORY:  Family History   Problem Relation Age of Onset     Liver Disease No family hx of        PHYSICAL EXAM:   General  Confused, drowsy, disoriented  Vital Signs with Ranges  Temp: 97.9  F (36.6  C) Temp src: Axillary BP: 126/71 Pulse: 65 Heart Rate: 53 Resp: 16 SpO2: 98 % O2 Device: None (Room air)    I/O last 3 completed shifts:  In: 1200 [P.O.:1200]  Out: 200 [Urine:200]    Constitutional: healthy, alert and no distress   Cardiovascular: negative, PMI normal. No lifts, heaves, or thrills. RRR. No murmurs, clicks gallops or rub  Respiratory: negative, Percussion normal. Good diaphragmatic excursion. Lungs clear  Psychiatric: mentation appears normal and affect normal/bright  Head: Normocephalic. No masses, lesions, tenderness or abnormalities  Neck: Neck supple. No adenopathy. Thyroid symmetric, normal size,, Carotids without bruits.  Abdomen: Abdomen soft, non-tender. BS normal. No masses, organomegaly  NEURO: Gait normal. Reflexes normal and symmetric. Sensation grossly WNL.  SKIN: no suspicious lesions or rashes          ADDITIONAL COMMENTS:   I reviewed the patient's new clinical lab test results.   Recent Labs   Lab Test  02/19/17 0905 02/18/17   1021 02/17/17 0515  12/05/16   0702   WBC  6.6  7.1  7.5  5.8   HGB  12.1*  12.8*  12.5*  11.4*   MCV  95  96  97  97   PLT  110*  106*  113*  104*   INR   --   1.36*  1.36*  1.38*     Recent Labs   Lab Test  02/19/17   0905 02/18/17   1021 02/17/17   0515   POTASSIUM  3.5  3.4  4.2   CHLORIDE  107  105  115*   CO2  21  21  22   BUN  22  25  23   ANIONGAP  11  11  10     Recent Labs   Lab Test  02/19/17   0905 02/18/17   1021 02/17/17   0515   08/29/12   0300   ALBUMIN  3.0*  3.3*  3.2*   < >   --    BILITOTAL  1.2  2.5*  2.1*   < >   --    ALT  32  37  35   < >   --    AST  36  38  34   < >   --    PROTEIN   --    --    --    --   30*    < > = values in this interval not displayed.       I reviewed the patient's new imaging  results.        CONSULTATION ASSESSMENT AND PLAN:    Active Problems:    Hepatic encephalopathy (H)    Assessment:   Hepatic encephalopathy.  H/O MELD of 14 in Dec. Patient was off xifaxan since dec. MELD 13 now.  Change in mental status.   Acute grade 2-3 encephalopathy. Likely due to ETOH use. No evidence of acute infection. Hemoglobin stable.  Labs stable.  A/P Paracentesis tomorrow to r/o SBP.  Agree with lactulose.  Start on Xifiaxan.  2 gm Na diet.                 José Manuel Huntley MD, FACP  [facility].  Office: [phone]  Cell : [phone]

## 2017-02-19 NOTE — PROGRESS NOTES
[facility]  Hospitalist Progress Note  Redd Vieyra MD 02/19/2017             Assessment and Plan:        Ravindra Fregoso is a 56-year-old gentleman who presents with confusion, hepatic encephalopathy with elevated ammonia level who has a history of alcohol-related cirrhosis with varices, admitted with decompensated alcoholic liver disease in the setting of hepatic encephalopathy, noncompliance with respect to his alcohol intake, transferred from [facility] to [facility] for further treatment.       Hepatic encephalopathy secondary to decompensated alcoholic liver cirrhosis with ascites-   Improved mentation and Ammonia 47 on 2/17  The patient is followed by Dr. Sidney Wong, hepatologist at [facility]. The patient recently was taken off his rifaximin due to stability of his encephalopathy.   Continue - oral lactulose 4 times a day and has been started on Rifaximin per GI  - Continue amiloride and Bumex   -- Abdominal US- negative for ascitics   -continue 2 gm sodium diet,restricted protein diet      Alcohol dependency  No current signs of withdrawal.      Hypertension   - Continue amiloride and Bumex      History of depression.   - Continue Lexapro.       DVT Prophylaxis: Pneumatic Compression Devices  Code Status: Full Code  Disposition: Expected discharge in 1-3 days pending improvement in mentation  Await GI evaluation today. Possible D/C if stable.         Interval History (Subjective):      Improved mentation. No abdominal pain, no n/v/fever. Has had loose BMs 3-4 daily.                   Physical Exam:      Last Vital Signs:  /71 (BP Location: Right arm)  Pulse 65  Temp 97.9  F (36.6  C) (Axillary)  Resp 16  Wt 67.6 kg (149 lb 0.5 oz)  SpO2 98%  BMI 25.57 kg/m2      Intake/Output Summary (Last 24 hours) at 02/19/17 1257  Last data filed at 02/19/17 1000   Gross per 24 hour   Intake             1000 ml   Output              200 ml   Net              800 ml       Constitutional: Awake,  alert, cooperative, no apparent distress   Respiratory: Clear to auscultation bilaterally, no crackles or wheezing   Cardiovascular: Regular rate and rhythm, normal S1 and S2, and 3/6 systolic murmur noted   Abdomen: Normal bowel sounds, soft, non-distended, non-tender, palpatory spleen,  Small umbilical hernia. No pain.    Skin: No rashes, no cyanosis, dry to touch   Neuro: Alert and oriented x3, no weakness, numbness, memory loss   Extremities: No edema, normal range of motion   Other(s):        All other systems: Negative          Medications:      All current medications were reviewed with changes reflected in problem list.         Data:      All new lab and imaging data was reviewed.   Labs:    Recent Labs  Lab 02/19/17  0905 02/18/17  1021 02/17/17  0515   WBC 6.6 7.1 7.5   HGB 12.1* 12.8* 12.5*   HCT 34.2* 36.0* 35.0*   MCV 95 96 97   * 106* 113*       Recent Labs  Lab 02/19/17 0905 02/18/17  1021 02/17/17  0515    137 147*   POTASSIUM 3.5 3.4 4.2   CHLORIDE 107 105 115*   CO2 21 21 22   ANIONGAP 11 11 10   * 125* 121*   BUN 22 25 23   CR 0.98 1.12 1.07   GFRESTIMATED 78 67 71   GFRESTBLACK >90African American GFR Calc 81 86   NADJA 8.0* 8.4* 8.3*   PROTTOTAL 6.9 7.4 7.2   ALBUMIN 3.0* 3.3* 3.2*   BILITOTAL 1.2 2.5* 2.1*   ALKPHOS 95 104 101   AST 36 38 34   ALT 32 37 35      Imaging:   No results found for this or any previous visit (from the past 24 hour(s)).

## 2017-02-23 ENCOUNTER — TELEPHONE (OUTPATIENT)
Dept: GASTROENTEROLOGY | Facility: CLINIC | Age: 57
End: 2017-02-23

## 2017-02-23 NOTE — TELEPHONE ENCOUNTER
Prior Authorization Specialty Medication Request    Medication/Dose: Xifaxan 550mg  Diagnosis and ICD: Hepatic encephalopathy k72.90  New/Renewal/Insurance Change PA:     Important Lab Values:     Previously Tried and Failed Therapies: lactulose alone    Rationale: Xifaxan helps to lower the toxin build up in the blood while lactulose works by cleansing the toxin build up in the liver. Adjunctive therapy.    Would you like to include any research articles?    If yes please include the hyperlink(s) below or fax @ [phone].    http://[email or web address].knox.com/doi/10.1111/[email or web address]/full    If you received a fax notification from an outside Pharmacy;  Pharmacy Name:  Pharmacy #:  Pharmacy Fax:

## 2017-03-01 NOTE — TELEPHONE ENCOUNTER
PA TIER EXCEPTION INITIATED    Medication: Xifaxan 550mg - TIER EXCEPTION  Insurance Company: [employer] - Phone [phone] Fax [phone]  Pharmacy Filling the Rx: [id number] - [place] - [address] [place] [address] E  Filling Pharmacy Phone: [phone]  Filling Pharmacy Fax:    Start Date: 3/1/2017    This medication does not require a PA.  Patient picked up RX for a co-pay of $854.00, he has reached his deductible but pays 25% co-insurance. This medication is on a tier 5 so I initiated a tier exception.

## 2017-03-02 NOTE — TELEPHONE ENCOUNTER
TIER EXCEPTION DENIED    Medication: Xifaxan 550mg - TIER EXCEPTION- DENIED    Denial Date: 3/1/2017    Denial Rational: tier exception is denied because Xifaxan is a specialty drug and no tier exception is allowed for specialty drugs        Appeal Information:

## 2017-03-04 NOTE — DISCHARGE SUMMARY
[facility] Discharge Summary    Ravindra Fregoso MRN# [id number]   Age: 56 year old YOB: 1960     Date of Admission:  2/16/2017  Date of Discharge::  2/19/2017  5:35 PM   Admitting Physician:  Liban Whitaker MD  Discharge Physician:  Redd Vieyra MD    Home clinic: Dr Moo Flores          Admission Diagnoses:   Cirrhosis, obtunded, hyperammonemia  Hepatic encephalopathy (H)            Discharge Diagnosis:   Patient Active Problem List   Diagnosis     Acute kidney injury (H)     Cirrhosis with alcoholism (H)     Other ascites     Hepatic encephalopathy (H)     Osteoporosis               Procedures:   All laboratory data reviewed  Lab Results   Component Value Date    WBC 6.6 02/19/2017    WBC 7.1 02/18/2017    WBC 7.5 02/17/2017    HGB 12.1 (L) 02/19/2017    HGB 12.8 (L) 02/18/2017    HGB 12.5 (L) 02/17/2017    HCT 34.2 (L) 02/19/2017    HCT 36.0 (L) 02/18/2017    HCT 35.0 (L) 02/17/2017     (L) 02/19/2017     (L) 02/18/2017     (L) 02/17/2017     02/19/2017     02/18/2017     (H) 02/17/2017    POTASSIUM 3.5 02/19/2017    POTASSIUM 3.4 02/18/2017    POTASSIUM 4.2 02/17/2017    CHLORIDE 107 02/19/2017    CHLORIDE 105 02/18/2017    CHLORIDE 115 (H) 02/17/2017    CO2 21 02/19/2017    CO2 21 02/18/2017    CO2 22 02/17/2017    BUN 22 02/19/2017    BUN 25 02/18/2017    BUN 23 02/17/2017    CR 0.98 02/19/2017    CR 1.12 02/18/2017    CR 1.07 02/17/2017     (H) 02/19/2017     (H) 02/18/2017     (H) 02/17/2017    AST 36 02/19/2017    AST 38 02/18/2017    AST 34 02/17/2017    ALT 32 02/19/2017    ALT 37 02/18/2017    ALT 35 02/17/2017    ALKPHOS 95 02/19/2017    ALKPHOS 104 02/18/2017    ALKPHOS 101 02/17/2017    BILITOTAL 1.2 02/19/2017    BILITOTAL 2.5 (H) 02/18/2017    BILITOTAL 2.1 (H) 02/17/2017    KEVIN 47 02/18/2017    KEVIN 75 (H) 02/17/2017    KEVIN 38 (H) 08/31/2012    INR 1.36 (H) 02/18/2017    INR 1.36 (H) 02/17/2017     INR 1.38 (H) 12/05/2016     Imaging performed:   ABDOMINAL ULTRASOUND 2/18/2017 7:42 AM      HISTORY: Cirrhosis and ascites.     COMPARISON: 6/6/2016     FINDINGS:   Gallbladder: There are a few mobile, shadowing gallstones in the  normal-sized gallbladder. No wall thickening or focal tenderness.  Sludge is also seen in the gallbladder.     Bile ducts: CHD is normal diameter. No intrahepatic biliary  dilatation.     Liver: Small with coarsened echotexture and lobulated surface  consistent with cirrhosis.     Pancreas: Neck and body appear normal. Head and tail are obscured by  gas.     Spleen: Normal.      Right kidney: Normal.      Left kidney: Normal.     Aorta and IVC: Normal.      No ascites is noted, with resolution since previous exam.         IMPRESSION:   1. Hepatic cirrhosis.  2. Cholelithiasis and sludge in the gallbladder.  3. Interval resolution of ascites.     YAKELIN MARIA MD               Medications Prior to Admission:     No prescriptions prior to admission.             Discharge Medications:     Discharge Medication List as of 2/19/2017  5:14 PM      START taking these medications    Details   rifaximin (XIFAXAN) 550 MG TABS tablet Take 1 tablet (550 mg) by mouth 2 times daily, Disp-60 tablet, R-0, E-Prescribe         CONTINUE these medications which have NOT CHANGED    Details   AMILORIDE HCL PO Take 10 mg by mouth 2 times daily, Historical      BUMETANIDE PO Take 1 mg by mouth 2 times daily, Historical      ESCITALOPRAM OXALATE PO Take 20 mg by mouth daily, Historical      lactulose (CHRONULAC) 10 GM/15ML solution Take 20 g by mouth 2 times daily, Historical      potassium chloride gillian er (K-DUR) Take 20 mEq by mouth daily, Historical      alendronate (FOSAMAX) 70 MG tablet Take 70 mg by mouth every 7 days, Historical      omeprazole (PRILOSEC) 20 MG capsule Take 1 capsule (20 mg) by mouth daily, Disp-90 capsule, R-1, E-Prescribe      Multiple Vitamins-Minerals (MULTIVITAL) TABS Take 1 tablet  by mouth, Disp-30 tablet, Historical      amoxicillin (AMOXIL) 500 MG capsule Take 4 capsules (2,000 mg) by mouth See Admin Instructions, Disp-4 capsule, R-5, E-PrescribeTake 2000 mg 1-2 hours before dental work                   Consultations:   Consultation during this admission received from gastroenterology   José Manuel Huntley MD   Gastroenterology   Consult Orders:   1. Gastroenterology IP Consult: hepatic encephalopathy; Consultant may enter orders: Yes; Patient to be seen: Routine - within 24 hours [[id number]] ordered by Liban Whitaker MD at 02/16/17 1939     Hide copied text    Hepatic encephalopathy.  H/O MELD of 14 in Dec. Patient was off xifaxan since dec. MELD 13 now.  Change in mental status.   Acute grade 2-3 encephalopathy.  Labs stable.  Positive ascites.  A/P Paracentesis tomorrow to r/o SBP.  Agree with lactulose.  Start on Xifiaxan.  2 gm Na diet.      [facility]  [phone]               Brief History of Illness:      Ravindra Fregoso is a 56-year-old  male with history of alcohol-related cirrhosis, ascites and prior history of hepatic encephalopathy who continues to consume alcohol, albeit intermittently, last one being about a week ago, who also has a history of grade 1 varices, portal hypertensive gastropathy, followed by hepatologist, Dr. Sidney Wong from the [facility], was at [facility] earlier today. The patient has had progressively increasing ammonia levels over the last few days. Two days ago his ammonia level was apparently 56. His lactulose dose was increased. Nonetheless, the patient presented with confusion at the hospital in [place] where his ammonia was 126. The patient was seen by a Dr. Messer there. He had a lactic acid of 1.8. Urine tox was negative. Alcohol level was negative. Glucose is 153. Lytes and LFTs were normal. Total bilirubin was 1.6. AST, ALT, alkaline phosphatase are all  normal. He had a normal amylase and lipase as well. His white count was 7.5, hemoglobin 12.2. INR was 1.22. The patient is not on anticoagulation. He had a head CT which showed only mild atrophy. The patient was given 2 mg of Ativan for sedation and prior to his head CT he also received 25 grams of albumin as well as 1 gram of IV ceftriaxone for possible SBP. He was able to be awake enough to drink his lactulose. He presented to [facility] with no bowel movement today and confused with his wife at bedside. The patient is unable to really provide much history.          Hospital Course:      Admitted for hospital treatment, 56-year-old gentleman who presents with confusion, hepatic encephalopathy with elevated ammonia level who has a history of alcohol-related cirrhosis with varices,  with decompensated alcoholic liver disease in the setting of hepatic encephalopathy, noncompliance with respect to his alcohol intake, transferred from [facility] to [facility].       Hepatic encephalopathy secondary to decompensated alcoholic liver cirrhosis with ascites-   Improved mentation and Ammonia 47 on 2/17  The patient is followed by Dr. Sidney Wong, hepatologist at [facility]. The patient recently was taken off his rifaximin due to stability of his encephalopathy.   Continue - oral lactulose 4 times a day and has been started on Rifaximin per GI  - Continue amiloride and Bumex   -- Abdominal US- negative for ascitics   -continue 2 gm sodium diet,restricted protein diet  -IV Ceftriaxone continued during hospitalization for possible SBP and is stopped on discharge.       Alcohol dependency  No current signs of withdrawal.      Hypertension   - Continue amiloride and Bumex      History of depression.   - Continue Lexapro.                 Discharge Instructions and Follow-Up:   Discharge diet: 2g salt   Discharge activity: Activity as tolerated   Discharge follow-up: Follow up with primary care provider in 7 days  GI  follow up within 2 -3 weeks            Discharge Disposition:   Discharged to home      Attestation:  I have reviewed today's vital signs, notes, medications, labs and imaging.  Amount of time performed on this discharge summary: 20 minutes.    Redd Vieyra MD

## 2017-03-07 DIAGNOSIS — K70.31 ALCOHOLIC CIRRHOSIS OF LIVER WITH ASCITES (H): Primary | ICD-10-CM

## 2017-03-07 DIAGNOSIS — K21.9 GASTROESOPHAGEAL REFLUX DISEASE, ESOPHAGITIS PRESENCE NOT SPECIFIED: ICD-10-CM

## 2017-03-08 ENCOUNTER — TRANSFERRED RECORDS (OUTPATIENT)
Dept: HEALTH INFORMATION MANAGEMENT | Facility: CLINIC | Age: 57
End: 2017-03-08

## 2017-03-08 RX ORDER — BUMETANIDE 1 MG/1
1 TABLET ORAL DAILY
Qty: 30 TABLET | Refills: 5 | Status: SHIPPED | OUTPATIENT
Start: 2017-03-08 | End: 2017-03-21

## 2017-03-21 DIAGNOSIS — K70.31 ALCOHOLIC CIRRHOSIS OF LIVER WITH ASCITES (H): ICD-10-CM

## 2017-03-21 RX ORDER — BUMETANIDE 1 MG/1
1 TABLET ORAL DAILY
Qty: 90 TABLET | Refills: 1 | Status: SHIPPED | OUTPATIENT
Start: 2017-03-21 | End: 2017-10-06

## 2017-03-24 ENCOUNTER — CARE COORDINATION (OUTPATIENT)
Dept: GASTROENTEROLOGY | Facility: CLINIC | Age: 57
End: 2017-03-24

## 2017-03-24 DIAGNOSIS — K74.60 CIRRHOSIS (H): Primary | ICD-10-CM

## 2017-03-24 NOTE — PROGRESS NOTES
Received message to organize the following per Dr Barnett:  Please schedule EUS under MAC in unit j with me at the next available appointment (non emergent can wait for few weeks)   Clarissa please put an order and INR goal <1.5                Order placed for EUS and sent to endo scheduling.     Clarissa VERMA, RN Coordinator  Dr. Acosta, Dr. Karimi & Dr. Prince  Pancreas~Biliary  [phone] #[phone]

## 2017-03-27 ENCOUNTER — HOSPITAL ENCOUNTER (OUTPATIENT)
Facility: CLINIC | Age: 57
End: 2017-03-27
Attending: INTERNAL MEDICINE | Admitting: INTERNAL MEDICINE
Payer: MEDICARE

## 2017-04-17 DIAGNOSIS — R18.8 OTHER ASCITES: Primary | ICD-10-CM

## 2017-04-18 RX ORDER — AMILORIDE HYDROCHLORIDE 5 MG/1
10 TABLET ORAL DAILY
Qty: 90 TABLET | Refills: 0 | Status: ON HOLD | OUTPATIENT
Start: 2017-04-18 | End: 2017-04-21

## 2017-04-20 ENCOUNTER — TRANSFERRED RECORDS (OUTPATIENT)
Dept: HEALTH INFORMATION MANAGEMENT | Facility: CLINIC | Age: 57
End: 2017-04-20

## 2017-04-20 ENCOUNTER — HOSPITAL ENCOUNTER (INPATIENT)
Facility: CLINIC | Age: 57
LOS: 3 days | Discharge: HOME | DRG: 637 | End: 2017-04-23
Attending: INTERNAL MEDICINE | Admitting: INTERNAL MEDICINE
Payer: MEDICARE

## 2017-04-20 DIAGNOSIS — R07.0 THROAT PAIN: ICD-10-CM

## 2017-04-20 DIAGNOSIS — K86.89 PANCREATIC INSUFFICIENCY: ICD-10-CM

## 2017-04-20 DIAGNOSIS — K76.82 HEPATIC ENCEPHALOPATHY (H): ICD-10-CM

## 2017-04-20 DIAGNOSIS — E11.9 DIABETES MELLITUS WITHOUT COMPLICATION (H): Primary | ICD-10-CM

## 2017-04-20 DIAGNOSIS — K11.20 SALIVARY GLAND INFECTION: ICD-10-CM

## 2017-04-20 DIAGNOSIS — K11.20 SALIVARY GLAND INFLAMMATION: ICD-10-CM

## 2017-04-20 DIAGNOSIS — K11.7 DISTURBANCE OF SALIVARY SECRETION: ICD-10-CM

## 2017-04-20 PROBLEM — R73.9 HYPERGLYCEMIA: Status: ACTIVE | Noted: 2017-04-20

## 2017-04-20 LAB — GLUCOSE BLDC GLUCOMTR-MCNC: 325 MG/DL (ref 70–99)

## 2017-04-20 PROCEDURE — 85027 COMPLETE CBC AUTOMATED: CPT | Performed by: INTERNAL MEDICINE

## 2017-04-20 PROCEDURE — 00000146 ZZHCL STATISTIC GLUCOSE BY METER IP

## 2017-04-20 PROCEDURE — 12000001 ZZH R&B MED SURG/OB UMMC

## 2017-04-20 PROCEDURE — 84100 ASSAY OF PHOSPHORUS: CPT | Performed by: INTERNAL MEDICINE

## 2017-04-20 PROCEDURE — 85610 PROTHROMBIN TIME: CPT | Performed by: INTERNAL MEDICINE

## 2017-04-20 PROCEDURE — 80048 BASIC METABOLIC PNL TOTAL CA: CPT | Performed by: INTERNAL MEDICINE

## 2017-04-20 PROCEDURE — 83735 ASSAY OF MAGNESIUM: CPT | Performed by: INTERNAL MEDICINE

## 2017-04-20 PROCEDURE — 36415 COLL VENOUS BLD VENIPUNCTURE: CPT | Performed by: INTERNAL MEDICINE

## 2017-04-20 PROCEDURE — 99223 1ST HOSP IP/OBS HIGH 75: CPT | Mod: AI | Performed by: INTERNAL MEDICINE

## 2017-04-20 PROCEDURE — 83930 ASSAY OF BLOOD OSMOLALITY: CPT | Performed by: INTERNAL MEDICINE

## 2017-04-20 PROCEDURE — 80076 HEPATIC FUNCTION PANEL: CPT | Performed by: INTERNAL MEDICINE

## 2017-04-20 RX ORDER — LACTULOSE 10 G/15ML
20 SOLUTION ORAL 2 TIMES DAILY
Status: DISCONTINUED | OUTPATIENT
Start: 2017-04-20 | End: 2017-04-23 | Stop reason: HOSPADM

## 2017-04-20 RX ORDER — DEXTROSE MONOHYDRATE 25 G/50ML
25-50 INJECTION, SOLUTION INTRAVENOUS
Status: DISCONTINUED | OUTPATIENT
Start: 2017-04-20 | End: 2017-04-21

## 2017-04-20 RX ORDER — BUMETANIDE 1 MG/1
1 TABLET ORAL DAILY
Status: DISCONTINUED | OUTPATIENT
Start: 2017-04-21 | End: 2017-04-23 | Stop reason: HOSPADM

## 2017-04-20 RX ORDER — AMILORIDE HYDROCHLORIDE 5 MG/1
10 TABLET ORAL DAILY
Status: DISCONTINUED | OUTPATIENT
Start: 2017-04-21 | End: 2017-04-20

## 2017-04-20 RX ORDER — AMILORIDE HYDROCHLORIDE 5 MG/1
10 TABLET ORAL 2 TIMES DAILY
Status: DISCONTINUED | OUTPATIENT
Start: 2017-04-21 | End: 2017-04-23 | Stop reason: HOSPADM

## 2017-04-20 RX ORDER — NALOXONE HYDROCHLORIDE 0.4 MG/ML
.1-.4 INJECTION, SOLUTION INTRAMUSCULAR; INTRAVENOUS; SUBCUTANEOUS
Status: DISCONTINUED | OUTPATIENT
Start: 2017-04-20 | End: 2017-04-23 | Stop reason: HOSPADM

## 2017-04-20 RX ORDER — NICOTINE POLACRILEX 4 MG
15-30 LOZENGE BUCCAL
Status: DISCONTINUED | OUTPATIENT
Start: 2017-04-20 | End: 2017-04-21

## 2017-04-20 NOTE — IP AVS SNAPSHOT
[address] [address]    Phone:  [phone]                                       After Visit Summary   4/20/2017    Ravindra Fregoso    MRN: [id number]           After Visit Summary Signature Page     I have received my discharge instructions, and my questions have been answered. I have discussed any challenges I see with this plan with the nurse or doctor.    ..........................................................................................................................................  Patient/Patient Representative Signature      ..........................................................................................................................................  Patient Representative Print Name and Relationship to Patient    ..................................................               ................................................  Date                                            Time    ..........................................................................................................................................  Reviewed by Signature/Title    ...................................................              ..............................................  Date                                                            Time

## 2017-04-20 NOTE — IP AVS SNAPSHOT
MRN:[id number]                      After Visit Summary   4/20/2017    Ravindra Fregoso    MRN: [id number]           Thank you!     Thank you for choosing [facility] for your care. Our goal is always to provide you with excellent care. Hearing back from our patients is one way we can continue to improve our services. Please take a few minutes to complete the written survey that you may receive in the mail after you visit with us. Thank you!        Patient Information     Date Of Birth          1960        Designated Caregiver       Most Recent Value    Caregiver    Will someone help with your care after discharge? no      About your hospital stay     You were admitted on:  April 20, 2017 You last received care in the:  [address] [facility] [place]    You were discharged on:  April 23, 2017        Reason for your hospital stay       You were admitted for pancreas lesion and new onset diabetes.  Repeated CT scan showed most likely lesion from chronic pancreatitis. Gastroenterologist(GI) plan to follow up with you in clinic. No intervention needed at this time. Frequent loose stool also likely signs of pancreatic enzyme deficiency. We sent a stool confirmatory test to [place], result is in process. In the mean time, we will supplement pancreatic enzyme with meal. You will review the final result with GI at follow up visit. 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 You will need long term insulin and follow up with your primary care provider.    CT scan from outside hospital showed very small bleeding at right sided of your head. Our neurology strongly recommended you stop smoking. You will need a follow up CT at some point in the future with your primary care provider.    You developed swelling salivary glands after CT scan, most likely contrast related. Other possible causes include bacterial or viral infection. Plan supportive  treatment and empirical treated with antibiotic for 7 days. If symptoms not improve, need to be seen by ENT doctor.    Blood culture from [facility] grew skin bacteria, most likely a contamination. Repeat blood culture here has not grown any thing.                  Who to Call     For medical emergencies, please call 911.  For non-urgent questions about your medical care, please call your primary care provider or clinic, [phone]          Attending Provider     Provider Specialty    Timoteo Gann MD Internal Medicine       Primary Care Provider Office Phone # Fax #    Moo Barajas [phone]       [facility] [address]   [place] [facility] [address]        After Care Instructions     Activity       Your activity upon discharge: activity as tolerated            Diet       Follow this diet upon discharge: Orders Placed This Encounter      Room Service      Low Consistent CHO Diet            Discharge Instructions       Please stay hydrated, apply moist heat to the involved area, and suck on tart hard candies to promote salivary flow.   Take nonsteroidal antiinflammatory drugs (NSAIDs) for pain.                  Follow-up Appointments     Adult [facility] Follow-up and recommended labs and tests       Follow up with   -- primary care provider, MOO BARAJAS, within 7 days for hospital follow- up.  The following labs/tests are recommended: BMP, LFT, INR.    -- GI (the coordinator with call next week)      Appointments on [place] and/or [facility] (with [facility] or [facility] provider or service). Call [phone] if you haven't heard regarding these appointments within 7 days of discharge.                  Your next 10 appointments already scheduled     May 02, 2017   Procedure with Guru Doron Barnett MD   [facility], [place], Endoscopy ([facility], [facility])    [address] [address] [phone]           [facility]  campus is located on the corner of [address] and [address] on the [place] [facility] [place]. It is easily accessible from virtually any point in the [place] area, via [address] and [address].            Jun 12, 2017  7:00 AM CDT   Lab with  LAB   [facility] ([facility])    [address]  [address] [place] [facility] [id number] [phone]            Jun 12, 2017  8:00 AM CDT   (Arrive by 7:45 AM)   Return General Liver with Rich Wong MD   [facility] Hepatology ([facility])    [address]  [address] [place] [facility] [place] [id number] [phone]              Additional Services     Medication Therapy Management Referral       Reason for referral:  takes rifaxamin or lactulose     This service is designed to help you get the most from your medications.  A specially trained pharmacist will work closely with you and your doctors  to solve any problems related to your medications and to help you get the   best results from taking them.      The Medication Therapy Management staff will call you to schedule an appointment.                  Pending Results     Date and Time Order Name Status Description    4/23/2017 0850 Mumps Confirmation PCR In process     4/23/2017 0850 Mumps Antibody IgG In process     4/23/2017 0850 Mumps antibody IgM In process     4/22/2017 0830 Beta strep group A culture Preliminary     4/22/2017 0712 Blood culture Preliminary     4/22/2017 0712 Blood culture Preliminary     4/21/2017 1457 Pancreatic Elastase Fecal In process     4/21/2017 0821 Insulin antibody In process     4/21/2017 0821 Glutamic acid decarboxylase antibody In process     4/21/2017 0821 C-peptide In process             Statement of Approval     Ordered          04/23/17 1142  I have reviewed and agree with all the recommendations and orders detailed in this document.  EFFECTIVE NOW     Approved and electronically signed by:  Jennifer  MD Dex             Admission Information     Date & Time Provider Department Dept. Phone    4/20/2017 Timoteo Gann MD [address] [facility] [place] [phone]      Your Vitals Were     Blood Pressure Pulse Temperature Respirations Weight Pulse Oximetry    134/80 (BP Location: Right arm) 56 96.8  F (36  C) (Oral) 16 66.2 kg (145 lb 15.1 oz) 98%    BMI (Body Mass Index)                   25.04 kg/m2           [email or web address] Information     [facility] gives you secure access to your electronic health record. If you see a primary care provider, you can also send messages to your care team and make appointments. If you have questions, please call your primary care clinic.  If you do not have a primary care provider, please call [phone] and they will assist you.        Care EveryWhere ID     This is your Care EveryWhere ID. This could be used by other organizations to access your [facility] medical records  [id number]           Review of your medicines      START taking        Dose / Directions    amylase-lipase-protease 90798 UNITS Cpep per EC capsule   Commonly known as:  CREON 24   Used for:  Pancreatic insufficiency        Dose:  1 capsule   Take 1 capsule (24,000 Units) by mouth 3 times daily (with meals)   Quantity:  60 capsule   Refills:  1       Benzocaine 20 % Aero spray   Commonly known as:  HURRICAINE/TOPEX   Used for:  Throat pain        Dose:  2 spray   Take 1 mL by mouth every 3 hours as needed for moderate pain   Quantity:  1 each   Refills:  0       benzocaine-menthol 6-10 MG lozenge   Commonly known as:  CHLORASEPTIC   Used for:  Throat pain        Dose:  1 lozenge   Place 1 lozenge inside cheek every hour as needed for sore throat   Quantity:  84 lozenge   Refills:  0       blood glucose lancets standard   Commonly known as:  no brand specified   Used for:  Diabetes mellitus without complication (H)        Use to test blood sugar 4 times daily or as directed.   Quantity:  1 Box   Refills:  3       blood  glucose monitoring test strip   Commonly known as:  ACCU-CHEK ROBERTO   Used for:  Diabetes mellitus without complication (H)        Use to test blood sugars 4 times daily or as directed.   Quantity:  100 strip   Refills:  3       cephalexin 250 MG capsule   Commonly known as:  KEFLEX   Indication:  Sialadenitis   Used for:  Salivary gland infection        Dose:  250 mg   Take 1 capsule (250 mg) by mouth every 6 hours for 7 days   Quantity:  28 capsule   Refills:  0       ibuprofen 600 MG tablet   Commonly known as:  ADVIL/MOTRIN   Used for:  Disturbance of salivary secretion        Dose:  600 mg   Take 1 tablet (600 mg) by mouth every 6 hours as needed for moderate pain   Quantity:  20 tablet   Refills:  0       * insulin aspart 100 UNIT/ML injection   Commonly known as:  NovoLOG PEN   Used for:  Diabetes mellitus without complication (H)        Dose:  1-10 Units   Inject 1-10 Units Subcutaneous 3 times daily (before meals)   Quantity:  3 mL   Refills:  1       * insulin aspart 100 UNIT/ML injection   Commonly known as:  NovoLOG PEN   Used for:  Diabetes mellitus without complication (H)        Dose:  1-7 Units   Inject 1-7 Units Subcutaneous At Bedtime   Quantity:  3 mL   Refills:  1       insulin glargine 100 UNIT/ML injection   Commonly known as:  LANTUS   Used for:  Diabetes mellitus without complication (H)        Dose:  22 Units   Inject 22 Units Subcutaneous At Bedtime   Quantity:  3 mL   Refills:  3       Sharps Container Misc   Used for:  Diabetes mellitus without complication (H)        Dose:  1 Units   1 Units 4 times daily as needed   Quantity:  1 each   Refills:  1       * Notice:  This list has 2 medication(s) that are the same as other medications prescribed for you. Read the directions carefully, and ask your doctor or other care provider to review them with you.      CONTINUE these medicines which have NOT CHANGED        Dose / Directions    aMILoride 5 MG tablet   Commonly known as:  MIDAMOR         Dose:  10 mg   Take 10 mg by mouth 2 times daily   Refills:  0       amoxicillin 500 MG capsule   Commonly known as:  AMOXIL   Used for:  Alcoholic cirrhosis of liver with ascites (H)        Dose:  2000 mg   Take 4 capsules (2,000 mg) by mouth See Admin Instructions   Quantity:  4 capsule   Refills:  5       bumetanide 1 MG tablet   Commonly known as:  BUMEX   Used for:  Alcoholic cirrhosis of liver with ascites (H)        Dose:  1 mg   Take 1 tablet (1 mg) by mouth daily   Quantity:  90 tablet   Refills:  1       ESCITALOPRAM OXALATE PO        Dose:  20 mg   Take 20 mg by mouth daily   Refills:  0       FOSAMAX 70 MG tablet   Generic drug:  alendronate        Dose:  70 mg   Take 70 mg by mouth every 7 days   Refills:  0       lactulose 10 GM/15ML solution   Commonly known as:  CHRONULAC        Dose:  20 g   Take 20 g by mouth 2 times daily   Refills:  0       MULTIVITAL Tabs        Dose:  1 tablet   Take 1 tablet by mouth   Quantity:  30 tablet   Refills:  0       omeprazole 20 MG CR capsule   Commonly known as:  priLOSEC   Used for:  Gastroesophageal reflux disease, esophagitis presence not specified, Alcoholic cirrhosis of liver with ascites (H)        Dose:  20 mg   Take 1 capsule (20 mg) by mouth daily   Quantity:  90 capsule   Refills:  1       potassium chloride gillian er   Commonly known as:  K-DUR        Dose:  20 mEq   Take 20 mEq by mouth daily   Refills:  0       rifaximin 550 MG Tabs tablet   Commonly known as:  XIFAXAN   Indication:  Infection Within the Abdomen   Used for:  Hepatic encephalopathy (H)        Dose:  550 mg   Take 1 tablet (550 mg) by mouth 2 times daily   Quantity:  60 tablet   Refills:  11            Where to get your medicines      These medications were sent to [facility] [place], [place] - [address] [address]     Phone:  [phone]     amylase-lipase-protease 83890 UNITS Cpep per EC capsule    Benzocaine 20 % Aero spray     benzocaine-menthol 6-10 MG lozenge    blood glucose lancets standard    blood glucose monitoring test strip    cephalexin 250 MG capsule    ibuprofen 600 MG tablet    insulin aspart 100 UNIT/ML injection    insulin aspart 100 UNIT/ML injection    insulin glargine 100 UNIT/ML injection    Sharps Container Misc                Protect others around you: Learn how to safely use, store and throw away your medicines at www.disposemymeds.org.             Medication List: This is a list of all your medications and when to take them. Check marks below indicate your daily home schedule. Keep this list as a reference.      Medications           Morning Afternoon Evening Bedtime As Needed    aMILoride 5 MG tablet   Commonly known as:  MIDAMOR   Take 10 mg by mouth 2 times daily   Last time this was given:  10 mg on 4/23/2017  8:10 AM                                amoxicillin 500 MG capsule   Commonly known as:  AMOXIL   Take 4 capsules (2,000 mg) by mouth See Admin Instructions                                amylase-lipase-protease 96225 UNITS Cpep per EC capsule   Commonly known as:  CREON 24   Take 1 capsule (24,000 Units) by mouth 3 times daily (with meals)                                Benzocaine 20 % Aero spray   Commonly known as:  HURRICAINE/TOPEX   Take 1 mL by mouth every 3 hours as needed for moderate pain   Last time this was given:  4/23/2017  6:17 AM                                benzocaine-menthol 6-10 MG lozenge   Commonly known as:  CHLORASEPTIC   Place 1 lozenge inside cheek every hour as needed for sore throat   Last time this was given:  1 lozenge on 4/23/2017  8:10 AM                                blood glucose lancets standard   Commonly known as:  no brand specified   Use to test blood sugar 4 times daily or as directed.                                blood glucose monitoring test strip   Commonly known as:  ACCU-CHEK ROBERTO   Use to test blood sugars 4 times daily or as directed.                                 bumetanide 1 MG tablet   Commonly known as:  BUMEX   Take 1 tablet (1 mg) by mouth daily   Last time this was given:  1 mg on 4/23/2017  8:10 AM                                cephalexin 250 MG capsule   Commonly known as:  KEFLEX   Take 1 capsule (250 mg) by mouth every 6 hours for 7 days   Last time this was given:  250 mg on 4/23/2017 11:04 AM                                ESCITALOPRAM OXALATE PO   Take 20 mg by mouth daily                                FOSAMAX 70 MG tablet   Take 70 mg by mouth every 7 days   Generic drug:  alendronate                                ibuprofen 600 MG tablet   Commonly known as:  ADVIL/MOTRIN   Take 1 tablet (600 mg) by mouth every 6 hours as needed for moderate pain   Last time this was given:  600 mg on 4/23/2017 11:04 AM                                * insulin aspart 100 UNIT/ML injection   Commonly known as:  NovoLOG PEN   Inject 1-10 Units Subcutaneous 3 times daily (before meals)   Last time this was given:  5 Units on 4/22/2017  9:41 PM                                * insulin aspart 100 UNIT/ML injection   Commonly known as:  NovoLOG PEN   Inject 1-7 Units Subcutaneous At Bedtime   Last time this was given:  5 Units on 4/22/2017  9:41 PM                                insulin glargine 100 UNIT/ML injection   Commonly known as:  LANTUS   Inject 22 Units Subcutaneous At Bedtime   Last time this was given:  22 Units on 4/22/2017  9:41 PM                                lactulose 10 GM/15ML solution   Commonly known as:  CHRONULAC   Take 20 g by mouth 2 times daily   Last time this was given:  20 g on 4/23/2017  8:10 AM                                MULTIVITAL Tabs   Take 1 tablet by mouth                                omeprazole 20 MG CR capsule   Commonly known as:  priLOSEC   Take 1 capsule (20 mg) by mouth daily   Last time this was given:  20 mg on 4/23/2017  8:10 AM                                potassium chloride gillian er   Commonly known as:  K-DUR    Take 20 mEq by mouth daily   Last time this was given:  20 mEq on 4/21/2017  3:28 AM                                rifaximin 550 MG Tabs tablet   Commonly known as:  XIFAXAN   Take 1 tablet (550 mg) by mouth 2 times daily   Last time this was given:  550 mg on 4/23/2017  8:10 AM                                Sharps Container Misc   1 Units 4 times daily as needed                                * Notice:  This list has 2 medication(s) that are the same as other medications prescribed for you. Read the directions carefully, and ask your doctor or other care provider to review them with you.

## 2017-04-21 ENCOUNTER — APPOINTMENT (OUTPATIENT)
Dept: CT IMAGING | Facility: CLINIC | Age: 57
DRG: 637 | End: 2017-04-21
Attending: INTERNAL MEDICINE
Payer: MEDICARE

## 2017-04-21 LAB
ALBUMIN SERPL-MCNC: 2.6 G/DL (ref 3.4–5)
ALP SERPL-CCNC: 112 U/L (ref 40–150)
ALT SERPL W P-5'-P-CCNC: 31 U/L (ref 0–70)
ANION GAP SERPL CALCULATED.3IONS-SCNC: 7 MMOL/L (ref 3–14)
ANION GAP SERPL CALCULATED.3IONS-SCNC: 9 MMOL/L (ref 3–14)
AST SERPL W P-5'-P-CCNC: 24 U/L (ref 0–45)
BILIRUB DIRECT SERPL-MCNC: 0.3 MG/DL (ref 0–0.2)
BILIRUB SERPL-MCNC: 0.9 MG/DL (ref 0.2–1.3)
BUN SERPL-MCNC: 7 MG/DL (ref 7–30)
BUN SERPL-MCNC: 8 MG/DL (ref 7–30)
CALCIUM SERPL-MCNC: 7.8 MG/DL (ref 8.5–10.1)
CALCIUM SERPL-MCNC: 7.8 MG/DL (ref 8.5–10.1)
CHLORIDE SERPL-SCNC: 106 MMOL/L (ref 94–109)
CHLORIDE SERPL-SCNC: 112 MMOL/L (ref 94–109)
CO2 SERPL-SCNC: 24 MMOL/L (ref 20–32)
CO2 SERPL-SCNC: 28 MMOL/L (ref 20–32)
CREAT SERPL-MCNC: 0.75 MG/DL (ref 0.66–1.25)
CREAT SERPL-MCNC: 0.88 MG/DL (ref 0.66–1.25)
ERYTHROCYTE [DISTWIDTH] IN BLOOD BY AUTOMATED COUNT: 14.3 % (ref 10–15)
GFR SERPL CREATININE-BSD FRML MDRD: 89 ML/MIN/1.7M2
GFR SERPL CREATININE-BSD FRML MDRD: ABNORMAL ML/MIN/1.7M2
GLUCOSE BLDC GLUCOMTR-MCNC: 109 MG/DL (ref 70–99)
GLUCOSE BLDC GLUCOMTR-MCNC: 133 MG/DL (ref 70–99)
GLUCOSE BLDC GLUCOMTR-MCNC: 185 MG/DL (ref 70–99)
GLUCOSE BLDC GLUCOMTR-MCNC: 204 MG/DL (ref 70–99)
GLUCOSE BLDC GLUCOMTR-MCNC: 224 MG/DL (ref 70–99)
GLUCOSE BLDC GLUCOMTR-MCNC: 288 MG/DL (ref 70–99)
GLUCOSE BLDC GLUCOMTR-MCNC: 361 MG/DL (ref 70–99)
GLUCOSE BLDC GLUCOMTR-MCNC: 369 MG/DL (ref 70–99)
GLUCOSE BLDC GLUCOMTR-MCNC: 388 MG/DL (ref 70–99)
GLUCOSE SERPL-MCNC: 116 MG/DL (ref 70–99)
GLUCOSE SERPL-MCNC: 218 MG/DL (ref 70–99)
HCT VFR BLD AUTO: 35.4 % (ref 40–53)
HGB BLD-MCNC: 12.5 G/DL (ref 13.3–17.7)
INR PPP: 1.36 (ref 0.86–1.14)
INSULIN SERPL-ACNC: 27.3 MU/L (ref 3–25)
MAGNESIUM SERPL-MCNC: 1.6 MG/DL (ref 1.6–2.3)
MAGNESIUM SERPL-MCNC: 2 MG/DL (ref 1.6–2.3)
MCH RBC QN AUTO: 33.2 PG (ref 26.5–33)
MCHC RBC AUTO-ENTMCNC: 35.3 G/DL (ref 31.5–36.5)
MCV RBC AUTO: 94 FL (ref 78–100)
OSMOLALITY SERPL: 304 MMOL/KG (ref 275–295)
PHOSPHATE SERPL-MCNC: 2.4 MG/DL (ref 2.5–4.5)
PHOSPHATE SERPL-MCNC: 2.9 MG/DL (ref 2.5–4.5)
PLATELET # BLD AUTO: 83 10E9/L (ref 150–450)
POTASSIUM SERPL-SCNC: 3 MMOL/L (ref 3.4–5.3)
POTASSIUM SERPL-SCNC: 3.8 MMOL/L (ref 3.4–5.3)
PROT SERPL-MCNC: 6.5 G/DL (ref 6.8–8.8)
RBC # BLD AUTO: 3.76 10E12/L (ref 4.4–5.9)
SODIUM SERPL-SCNC: 142 MMOL/L (ref 133–144)
SODIUM SERPL-SCNC: 144 MMOL/L (ref 133–144)
WBC # BLD AUTO: 7.6 10E9/L (ref 4–11)

## 2017-04-21 PROCEDURE — 83735 ASSAY OF MAGNESIUM: CPT | Performed by: INTERNAL MEDICINE

## 2017-04-21 PROCEDURE — 80048 BASIC METABOLIC PNL TOTAL CA: CPT | Performed by: INTERNAL MEDICINE

## 2017-04-21 PROCEDURE — 25000128 H RX IP 250 OP 636: Performed by: INTERNAL MEDICINE

## 2017-04-21 PROCEDURE — 83516 IMMUNOASSAY NONANTIBODY: CPT | Performed by: STUDENT IN AN ORGANIZED HEALTH CARE EDUCATION/TRAINING PROGRAM

## 2017-04-21 PROCEDURE — 25500064 ZZH RX 255 OP 636: Performed by: RADIOLOGY

## 2017-04-21 PROCEDURE — 25000125 ZZHC RX 250: Performed by: RADIOLOGY

## 2017-04-21 PROCEDURE — 36415 COLL VENOUS BLD VENIPUNCTURE: CPT | Performed by: INTERNAL MEDICINE

## 2017-04-21 PROCEDURE — 84681 ASSAY OF C-PEPTIDE: CPT | Performed by: STUDENT IN AN ORGANIZED HEALTH CARE EDUCATION/TRAINING PROGRAM

## 2017-04-21 PROCEDURE — 99233 SBSQ HOSP IP/OBS HIGH 50: CPT | Mod: GC | Performed by: INTERNAL MEDICINE

## 2017-04-21 PROCEDURE — 83525 ASSAY OF INSULIN: CPT | Performed by: STUDENT IN AN ORGANIZED HEALTH CARE EDUCATION/TRAINING PROGRAM

## 2017-04-21 PROCEDURE — 74177 CT ABD & PELVIS W/CONTRAST: CPT

## 2017-04-21 PROCEDURE — 83520 IMMUNOASSAY QUANT NOS NONAB: CPT | Performed by: STUDENT IN AN ORGANIZED HEALTH CARE EDUCATION/TRAINING PROGRAM

## 2017-04-21 PROCEDURE — 25000125 ZZHC RX 250: Performed by: INTERNAL MEDICINE

## 2017-04-21 PROCEDURE — 25000132 ZZH RX MED GY IP 250 OP 250 PS 637: Mod: GY | Performed by: INTERNAL MEDICINE

## 2017-04-21 PROCEDURE — 84100 ASSAY OF PHOSPHORUS: CPT | Performed by: INTERNAL MEDICINE

## 2017-04-21 PROCEDURE — 86337 INSULIN ANTIBODIES: CPT | Performed by: STUDENT IN AN ORGANIZED HEALTH CARE EDUCATION/TRAINING PROGRAM

## 2017-04-21 PROCEDURE — A9270 NON-COVERED ITEM OR SERVICE: HCPCS | Mod: GY | Performed by: INTERNAL MEDICINE

## 2017-04-21 PROCEDURE — 25000131 ZZH RX MED GY IP 250 OP 636 PS 637: Mod: GY | Performed by: STUDENT IN AN ORGANIZED HEALTH CARE EDUCATION/TRAINING PROGRAM

## 2017-04-21 PROCEDURE — 12000001 ZZH R&B MED SURG/OB UMMC

## 2017-04-21 PROCEDURE — 00000146 ZZHCL STATISTIC GLUCOSE BY METER IP

## 2017-04-21 RX ORDER — NICOTINE POLACRILEX 4 MG
15-30 LOZENGE BUCCAL
Status: DISCONTINUED | OUTPATIENT
Start: 2017-04-21 | End: 2017-04-23 | Stop reason: HOSPADM

## 2017-04-21 RX ORDER — POTASSIUM CL/LIDO/0.9 % NACL 10MEQ/0.1L
10 INTRAVENOUS SOLUTION, PIGGYBACK (ML) INTRAVENOUS
Status: DISCONTINUED | OUTPATIENT
Start: 2017-04-21 | End: 2017-04-23 | Stop reason: HOSPADM

## 2017-04-21 RX ORDER — DEXTROSE MONOHYDRATE 25 G/50ML
25-50 INJECTION, SOLUTION INTRAVENOUS
Status: DISCONTINUED | OUTPATIENT
Start: 2017-04-21 | End: 2017-04-23 | Stop reason: HOSPADM

## 2017-04-21 RX ORDER — POTASSIUM CHLORIDE 29.8 MG/ML
20 INJECTION INTRAVENOUS
Status: DISCONTINUED | OUTPATIENT
Start: 2017-04-21 | End: 2017-04-23 | Stop reason: HOSPADM

## 2017-04-21 RX ORDER — POTASSIUM CHLORIDE 750 MG/1
20-40 TABLET, EXTENDED RELEASE ORAL
Status: DISCONTINUED | OUTPATIENT
Start: 2017-04-21 | End: 2017-04-23 | Stop reason: HOSPADM

## 2017-04-21 RX ORDER — POTASSIUM CHLORIDE 1.5 G/1.58G
20-40 POWDER, FOR SOLUTION ORAL
Status: DISCONTINUED | OUTPATIENT
Start: 2017-04-21 | End: 2017-04-23 | Stop reason: HOSPADM

## 2017-04-21 RX ORDER — POTASSIUM CHLORIDE 7.45 MG/ML
10 INJECTION INTRAVENOUS
Status: DISCONTINUED | OUTPATIENT
Start: 2017-04-21 | End: 2017-04-23 | Stop reason: HOSPADM

## 2017-04-21 RX ORDER — MAGNESIUM SULFATE HEPTAHYDRATE 40 MG/ML
4 INJECTION, SOLUTION INTRAVENOUS EVERY 4 HOURS PRN
Status: DISCONTINUED | OUTPATIENT
Start: 2017-04-21 | End: 2017-04-23 | Stop reason: HOSPADM

## 2017-04-21 RX ORDER — AMILORIDE HYDROCHLORIDE 5 MG/1
10 TABLET ORAL 2 TIMES DAILY
COMMUNITY
End: 2017-04-26

## 2017-04-21 RX ORDER — IOPAMIDOL 755 MG/ML
89 INJECTION, SOLUTION INTRAVASCULAR ONCE
Status: COMPLETED | OUTPATIENT
Start: 2017-04-21 | End: 2017-04-21

## 2017-04-21 RX ADMIN — IOPAMIDOL 89 ML: 755 INJECTION, SOLUTION INTRAVENOUS at 16:37

## 2017-04-21 RX ADMIN — LACTULOSE 20 G: 10 SOLUTION ORAL at 20:25

## 2017-04-21 RX ADMIN — RIFAXIMIN 550 MG: 550 TABLET ORAL at 20:25

## 2017-04-21 RX ADMIN — SODIUM CHLORIDE, PRESERVATIVE FREE 80 ML: 5 INJECTION INTRAVENOUS at 16:37

## 2017-04-21 RX ADMIN — INSULIN ASPART 1 UNITS: 100 INJECTION, SOLUTION INTRAVENOUS; SUBCUTANEOUS at 14:10

## 2017-04-21 RX ADMIN — RIFAXIMIN 550 MG: 550 TABLET ORAL at 09:29

## 2017-04-21 RX ADMIN — INSULIN ASPART 5 UNITS: 100 INJECTION, SOLUTION INTRAVENOUS; SUBCUTANEOUS at 19:03

## 2017-04-21 RX ADMIN — SODIUM CHLORIDE 1000 ML: 9 INJECTION, SOLUTION INTRAVENOUS at 00:34

## 2017-04-21 RX ADMIN — AMILORIDE HYDROCLORIDE 10 MG: 5 TABLET ORAL at 09:29

## 2017-04-21 RX ADMIN — HUMAN INSULIN 2 UNITS/HR: 100 INJECTION, SOLUTION SUBCUTANEOUS at 01:29

## 2017-04-21 RX ADMIN — POTASSIUM CHLORIDE 40 MEQ: 750 TABLET, EXTENDED RELEASE ORAL at 01:30

## 2017-04-21 RX ADMIN — OMEPRAZOLE 20 MG: 20 CAPSULE, DELAYED RELEASE ORAL at 09:29

## 2017-04-21 RX ADMIN — LACTULOSE 20 G: 10 SOLUTION ORAL at 09:30

## 2017-04-21 RX ADMIN — INSULIN ASPART 2 UNITS: 100 INJECTION, SOLUTION INTRAVENOUS; SUBCUTANEOUS at 17:31

## 2017-04-21 RX ADMIN — AMILORIDE HYDROCLORIDE 10 MG: 5 TABLET ORAL at 20:25

## 2017-04-21 RX ADMIN — RIFAXIMIN 550 MG: 550 TABLET ORAL at 00:34

## 2017-04-21 RX ADMIN — BUMETANIDE 1 MG: 1 TABLET ORAL at 09:30

## 2017-04-21 RX ADMIN — HUMAN INSULIN 1.5 UNITS/HR: 100 INJECTION, SOLUTION SUBCUTANEOUS at 02:25

## 2017-04-21 RX ADMIN — INSULIN GLARGINE 15 UNITS: 100 INJECTION, SOLUTION SUBCUTANEOUS at 22:24

## 2017-04-21 RX ADMIN — LACTULOSE 20 G: 10 SOLUTION ORAL at 00:34

## 2017-04-21 RX ADMIN — POTASSIUM CHLORIDE 20 MEQ: 750 TABLET, EXTENDED RELEASE ORAL at 03:28

## 2017-04-21 NOTE — PROVIDER NOTIFICATION
Can you please call to verify Insulin gtt orders. Jenny [phone] notified. MD called writer back and stated that gtt could be shut off since BG level 109. Will continue to monitor.

## 2017-04-21 NOTE — CONSULTS
[facility]: [place]  Neurology Consultation    Patient Name:  Ravindra Fregoso  MRN:  [id number]    :  1960  Date of Admission:  2017  Date of Service:  2017  Primary care provider:  Moo Flores      We were asked to see the patient by Dr. Mcintyre to evaluate: Right frontal lobe hemorrhage on imaging.    History of Present Illness:   58 yo male with PMH of EtOH cirrhosis c/b esophageal varices, hepatic encephalopathy and ascites, pancreatic mass who transferred from OSH for hyperglycemia in setting of newly diagnosed diabetes and also found to have hyperintensity in right frontal lobe suggestive of small intracerebral hemorrhage. Neurology consulted  to comment on imaging and assess for further work up.    History obtained from patient, his wife, and chart review. Reports going into clinic as part of evaluation for pancreatic mass. While in clinic felt very weak like legs might give out and was lightheaded. Blood sugar found to 612 and evaluated in ED without evidence of DKA. CT Head performed with finding of 6 mm higher density lesion in right frontal white matter, that further evaluated with MRI which showed perilesional edema. Transferred to [facility] for further care for hyperglycemia, given IVF and insulin.    Today denies vision changes, diplopia, dysarthria, dysphagia. No numbness, tingling, or focal weakness of extremities besides shaking/weak feeling in legs intermittently. Thinks this symptom has improved since yesterday and was able to walk to the bathroom without difficulty. No vertigo, imbalance or falls.     ROS: A 10-point ROS is negative except as noted above.      Past Medical/Surgical History:  Past Medical History:   Diagnosis Date     Cirrhosis of liver (H)     ETOH     Depression      Osteoporosis      Polyps, colonic      Medications:    Current Facility-Administered Medications   Medication     potassium chloride SA (K-DUR/KLOR-CON M) CR  tablet 20-40 mEq     potassium chloride (KLOR-CON) Packet 20-40 mEq     potassium chloride 10 mEq in 100 mL intermittent infusion     potassium chloride 10 mEq in 100 mL intermittent infusion with 10 mg lidocaine     potassium chloride 20 mEq in 50 mL intermittent infusion     magnesium sulfate 4 g in 100 mL sterile water (premade)     potassium phosphate 15 mmol in D5W 250 mL intermittent infusion     potassium phosphate 20 mmol in D5W 500 mL intermittent infusion     potassium phosphate 20 mmol in D5W 250 mL intermittent infusion     potassium phosphate 25 mmol in D5W 500 mL intermittent infusion     glucose 40 % gel 15-30 g    Or     dextrose 50 % injection 25-50 mL    Or     glucagon injection 1 mg     insulin glargine (LANTUS) injection 10 Units     insulin aspart (NovoPen ECHO/NovoLOG) cartridge     insulin aspart (NovoPen ECHO/NovoLOG) cartridge     Injection Device for insulin (NOVOPEN ECHO) 1 each     bumetanide (BUMEX) tablet 1 mg     lactulose (CHRONULAC) solution 20 g     omeprazole (priLOSEC) CR capsule 20 mg     rifaximin (XIFAXAN) tablet 550 mg     naloxone (NARCAN) injection 0.1-0.4 mg     aMILoride (MIDAMOR) tablet 10 mg     Allergies:  Allergies   Allergen Reactions     Atorvastatin Calcium      No Clinical Screening - See Comments      Platelets ( infusion)     Social History:  Social History     Social History Main Topics     Smoking status: Current Every Day Smoker     Packs/day: 0.20                 Comment: started at age 13, as much as 2 ppd, now 3 cigs/day     Alcohol use States last drink 5-6 months ago     Social History Narrative    Lives in [facility] with wife. Three children with recently  daughter (8/26/12). 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 Has drank about 8-10 alcoholic beverages per day over the past month (August 2012), with recent periods of sobriety lasting 2-6 months.      Family History:    Family History   Problem Relation  Age of Onset     Liver Disease No family hx of      Neurologic Examination:    Vitals: /83 (BP Location: Right arm)  Pulse 69  Temp 97.9  F (36.6  C) (Oral)  Resp 18  Wt 66.5 kg (146 lb 11.2 oz)  SpO2 96%  BMI 25.17 kg/m2  General: Cooperative, NAD, lying in bed  HEENT: Sclerae anicteric, MMM  Cardio: Regular rate and rhythm, systolic murmur  Resp: CTA bilaterally on anterior exam, no wheezing or crackles, non-labored speech  Neurologic:     Mental Status: Fully alert, attentive and oriented. Speech clear and fluent, comprehension intact. No neglect or extinction. Delayed recall needing clues to remember words     Cranial Nerves: Visual fields intact. PERRL. EOMI with normal smooth pursuit, no diplopia or nystagmus. Facial sensation intact/symmetric. Facial movements symmetric. Hearing intact to conversation. No dysarthria. Shoulder shrug strong bilaterally. Tongue protrusion midline     Motor: No involuntary movements observed. Normal tone. No pronator drift. Strength 5/5 throughout upper and lower extremities. Normal rapid finger tapping     Deep Tendon Reflexes: 2+/symmetric throughout, no clonus, toes downgoing     Sensory: Light touch sensation intact/symmetric throughout upper and lower extremities     Coordination: FNF and HS intact without dysmetria, Blanka within normal limits     Asterixis: One flap with slight tremor    Pertinent Investigations:   OSH Imaging:  MRI Head Brain O 4/20/2017  INDICATION:  57 year-old male. Neurologic dysfunction.  6 by 2 mm high-density focus right frontal white matter. Suspected petechial hemorrhage.  TECHNIQUE:  T1 sagittal, FLAIR axial, T2 axial, diffusion axial, ADC map axial, postcontrast T1 axial and postcontrast T1 coronal images acquired.  7.5 mL Gadavist.  COMPARISON:  CT April 20, 2017 and February 16, 2017.  FINDINGS:  CT shows an elliptical focus of high density situated the anterior right frontal horn.  There is no restricted diffusion.  There is mild  edema surrounding the zone of acute blood products situated anterior to the right frontal horn the correspond with the CT finding.  There is no pathologic intracranial enhancement.  Ventricles and cortical sulci are mildly prominent.  There is no pathologic extra-axial fluid collection.  Brainstem and cerebellum are normal.  Cerebellar tonsils are normally situated. Optic chiasm and pituitary are unremarkable. Intraorbital tissues are normal.  Usual flow void is present in the [place] of [place]. Paranasal and mastoid air cells are clear except for old blowout fracture of the right lamina papyracea.   IMPRESSION:  1. No change given technical differences.   2. MRI findings are compatible with acute, sub cortical, petechial type hemorrhage situated in the anterior paramedian right frontal lobe.  This finding is better demonstrated on the CT.  The MRI shows mild perilesional edema.  3. No restricted diffusion and no pathologic intracranial enhancement.  4. Mild atrophy.  Dictated by Sung Morton MD @ 4/20/2017 4:49:18 PM    CT Head Brain WO 4/20/2017  INDICATION: neurologic dysfunction  TECHNIQUE:  Noncontrast scans obtained from the foramen magnum through the vertex.  COMPARISON:  Head CT dated 02/16/2017 and 09/09/2013.  FINDINGS:  1. Mild cerebral atrophy with mild chronic small vessel white matter ischemic changes.  2. In the right frontal white matter, there is a 6 mm focus of higher density which was not appreciated on prior CT studies.  Appearance is suggestive of a small intracerebral hemorrhage.  No associated mass effect.  3. Plate and screws along the anterior wall of the right frontal sinus. No acute fractures. Visualized paranasal sinuses are clear.  4. Arterial calcification in the internal carotid arteries.  IMPRESSION:  6 mm higher density focus in the right frontal white matter.  Recommend MR for further evaluation to rule out underlying lesions such as cavernoma.  Dictated by Melchor Ledbetter MD @  4/20/2017 3:18:47 PM    Impression:  58 yo male with PMH of EtOH cirrhosis c/b esophageal varices, hepatic encephalopathy and ascites, pancreatic mass who transferred from OSH for hyperglycemia in setting of newly diagnosed diabetes and also found to have hyperintensity in right frontal lobe suggestive of small intracerebral hemorrhage. Neurology consulted 4/21 to comment on imaging and assess for further work up.    Evidence of focal hyperdense lesion on Head CT 4/20 that new since prior imaging in 2/2017. Small enough on CT that difficult to definitely distinguish bleed vs calcification. MRI with hyperintensity that likely represents perilesional edema. Believe symptoms of weakness, lightheadedness at presentation likely due to hyperglycemia and dehydration and not to newly identified frontal lesion. With liver disease does have element of coagulopathy and thrombocytopenia therefore would be good to monitor neurological status and re-image at some point. Risk for expansion of bleed remains quite low.    Recommendations:   - No acute need for further imaging unless change of neurologic status   - Would ensure patient's PCP aware of imaging findings so can monitor neurological function, assess timing of repeat imaging in future  - Risk factor modification as doing with treatment for new diabetes, encourage continued decrease of smoking    Thank you for involving neurology in the care of Ravindra Fregoso.  Please do not hesitate to call with questions/concerns.      Patient was seen and discussed with attending neurologist, Dr. Bobby.    Domitila Santiago MD PhD  Internal Medicine PGY-2  [phone]    PATIENT SEEN AND EXAMINED BY ME on April 21, 2017 I AGREE WITH THE FINDINGS DETAILED BY THE NEUROLOGY RESIDENT and documented in their note on April 21, 2017   PLEASE REFER TO THEIR NOTE FOR ADDITIONAL DETAILS.     DIAGNOSIS subacute hemorrhage. Very small  MANAGEMENT  No additional tests during this admission.  Consider followup as outpt with repeat imaging at some point. Discussed findings with patient and spouse as well as obtained input from vascular neurology.    KATHY ANN MD

## 2017-04-21 NOTE — PLAN OF CARE
Problem: Goal Outcome Summary  Goal: Goal Outcome Summary  Assumed cares at 2300. A/o x 3. Up with SBA. VSS. Orthostatic BP obtained. Insulin gtt initiated during night and stopped at 0505 per MD- Pt  at that time. 1000 ml NS bolus given. K+ replaced during shift- Recheck scheduled for 0730. L PIV SL. P: Wife to bring in Paperwork and CD's requested by MD, Cont to monitor BG levels. Bed alarm on for pt safety. Call light within reach. Will continue to monitor.

## 2017-04-21 NOTE — PLAN OF CARE
Problem: Goal Outcome Summary  Goal: Goal Outcome Summary  Admitted this 57 yr old male from [facility] ED via medical transport at 2200. Came in with CC of elevated blood glucose. CT and MRI showed intracranial bleed as well and ammonia level is 42.  Patient have significant history of liver Cirrhosis and have mass around pancreas. Patient settled in room. Orientation to room, call light and staff done. MD made aware of this admission and was seen by team thereafter. BG taken and resulted to 325 mg/dl. PIV for IV access.

## 2017-04-21 NOTE — CONSULTS
GASTROENTEROLOGY CONSULTATION      Date of Admission:  4/20/2017          ASSESSMENT AND RECOMMENDATIONS:   Assessment:  57 year old male with a history of EtOH cirrhosis c/b esophageal varices, HE, ascites as well as chronic pancreatitis, DMII who is admitted with symptomatic hyperglycemia, and who we are asked to possibly expedite a future EUS procedure.     # Pancreatic lesion. With calcifications and new hyperglycemia with rising A1c, considerations include progression of chronic pancreatitis in the setting of baseline insulin resistance. Less likely but to be considered would be malignancy (adenoca of pancreas can cause paraneoplastic insulin resistance), rarely glucagonoma but would expect insulin and/or glucose levels to be higher.     # Cirrhosis. History of decompensated disease as above but currently without HE or ascites. Has been sober x 6 months. MELD-Na is just 10 with normal bilirubin and creatinine. On amiloride and bumetanide, lactulose as outpatient.      Recommendations  -- please obtain pancreas protocol CT to assess for new mass lesion. Will need IV contrast  -- check pancreatic elastase in stool, if low supplement with pancreatic enzymes  -- consider checking fat soluble vitamins A,D,E, iron, zinc.  -- depending on results of CT, may consider EUS  -- as he is not having significant pain, would hold off on ERCP but treat as you are with insulin and if needed pancreatic enzymes  -- glucose control per primary team  -- continue lactulose, amiloride, bumetanide    Gastroenterology follow up recommendations: Pending further workup, will likely need both Hepatology and Pancreas clinic follow up    Thank you for involving us in this patient's care. Please do not hesitate to contact the GI service with any questions or concerns.     Pt care plan discussed with Dr. Barnett, GI staff physician.    Junaid Kim  Danielle  -------------------------------------------------------------------------------------------------------------------          Chief Complaint:   We were asked by Dr. Rios of [facility] to evaluate this patient with pancreatic lesion    History is obtained from the patient and the medical record.          History of Present Illness:   Ravindra Fregoso is a 57 year old male with a history of cirrhosis 2/2 EtOH, chronic pancreatitis, diabetes mellitus type II, depression who is admitted for dizziness, fatigue and found to have hyperglycemia.   Mr. Fregoso follows up in liver clinic normally with Dr. Little. In the past he has had workup of an abnormal ultrasound of the abdomen May 2015, and on EUS had pancreatic duct stone, strong e/o chronic pancreatitis as well as calcifications on CT.   In the last 6 months has stopped drinking EtOH.   In the last few weeks has had increasing thirst. He went to a scheduled outpatient visit in [place] and had weak/shaky legs, lightheadedness. Reports the later symptoms have been going on for a few days. Labs were done there and demonstrated a glucose in the 600s (>1000 on UA), A1c 10.6 from 5.7 in November.   Also had petechial hemorrhage on head MRI per report without an obvious injurious event. Was started on insulin gtt and sent here.   He denies recent weight loss. No n/v/d. No fevers, no recent EtOH. No dysuria. No cough or dyspnea or chest pain. He has intermittent abdominal discomfort in the mid abdomen, no exacerbating or alleviating factors. No increased abdominal girth. Constant severe thirst.           Past Medical History:   Reviewed and edited as appropriate  Past Medical History:   Diagnosis Date     Cirrhosis of liver (H)     ETOH     Depression      Osteoporosis      Polyps, colonic             Past Surgical History:   Reviewed and edited as appropriate   Past Surgical History:   Procedure Laterality Date     ESOPHAGOSCOPY, GASTROSCOPY, DUODENOSCOPY  (EGD), COMBINED  11/2/2012    Procedure: COMBINED ESOPHAGOSCOPY, GASTROSCOPY, DUODENOSCOPY (EGD);;  Surgeon: Darell Stewart MD;  Location: [facility]     ESOPHAGOSCOPY, GASTROSCOPY, DUODENOSCOPY (EGD), COMBINED  12/6/2013    Procedure: COMBINED ESOPHAGOSCOPY, GASTROSCOPY, DUODENOSCOPY (EGD);;  Surgeon: Darell Stewart MD;  Location: [facility]     ESOPHAGOSCOPY, GASTROSCOPY, DUODENOSCOPY (EGD), COMBINED N/A 12/17/2014    Procedure: COMBINED ESOPHAGOSCOPY, GASTROSCOPY, DUODENOSCOPY (EGD), BIOPSY SINGLE OR MULTIPLE;  Surgeon: Philippe Zhu MD;  Location: [facility]     ESOPHAGOSCOPY, GASTROSCOPY, DUODENOSCOPY (EGD), COMBINED N/A 12/22/2016    Procedure: COMBINED ESOPHAGOSCOPY, GASTROSCOPY, DUODENOSCOPY (EGD);  Surgeon: Rich Haque MD;  Location:  GI     [facility] ENDOSCOPY W EUS Left 5/4/2015    Procedure: COMBINED ENDOSCOPIC ULTRASOUND, ESOPHAGOSCOPY, GASTROSCOPY, DUODENOSCOPY (EGD);  Surgeon: Tremaine Sanches MD;  Location:  GI     HERNIORRHAPHY INGUINAL      bilateral repair     ORTHOPEDIC SURGERY      fx left leg, left hip      ORTHOPEDIC SURGERY      fx right ankle and right arm            Social History:   Reviewed and edited as appropriate  Social History     Social History     Marital status:      Spouse name: N/A     Number of children: N/A     Years of education: N/A     Occupational History     Not on file.     Social History Main Topics     Smoking status: Current Every Day Smoker     Packs/day: 0.20     Types: Cigarettes     Smokeless tobacco: Never Used      Comment: started at age 13, as much as 1 ppd, no 4-5 cigs/day     Alcohol use 0.0 oz/week     0 Standard drinks or equivalent per week      Comment: Last drink ~Feb. 2016, 1-2 drinks at special occasions like weddings     Drug use: No     Sexual activity: Not on file     Other Topics Concern     Not on file     Social History Narrative    Lives in [facility] with wife. Three children with recently   daughter (8/26/12). 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 Has drank about 8-10 alcoholic beverages per day over the past month (August 2012), with recent periods of sobriety lasting 2-6 months.             Family History:   Reviewed and edited as appropriate  No known history of gastrointestinal/liver disease or  gastrointestinal malignancies       Allergies:   Reviewed and edited as appropriate     Allergies   Allergen Reactions     Atorvastatin Calcium      No Clinical Screening - See Comments      Platelets ( infusion)            Medications:     Prescriptions Prior to Admission   Medication Sig Dispense Refill Last Dose     aMILoride (MIDAMOR) 5 MG tablet Take 2 tablets (10 mg) by mouth daily 90 tablet 0      bumetanide (BUMEX) 1 MG tablet Take 1 tablet (1 mg) by mouth daily 90 tablet 1      omeprazole (PRILOSEC) 20 MG CR capsule Take 1 capsule (20 mg) by mouth daily 90 capsule 1      rifaximin (XIFAXAN) 550 MG TABS tablet Take 1 tablet (550 mg) by mouth 2 times daily 60 tablet 11      AMILORIDE HCL PO Take 10 mg by mouth 2 times daily   2/15/2017 at Unknown time     ESCITALOPRAM OXALATE PO Take 20 mg by mouth daily   2/15/2017 at Unknown time     lactulose (CHRONULAC) 10 GM/15ML solution Take 20 g by mouth 2 times daily   2/15/2017 at Unknown time     potassium chloride gillian er (K-DUR) Take 20 mEq by mouth daily   2/15/2017 at Unknown time     alendronate (FOSAMAX) 70 MG tablet Take 70 mg by mouth every 7 days   2/13/2017 at Monday     Multiple Vitamins-Minerals (MULTIVITAL) TABS Take 1 tablet by mouth 30 tablet  2/15/2017 at Unknown time     amoxicillin (AMOXIL) 500 MG capsule Take 4 capsules (2,000 mg) by mouth See Admin Instructions 4 capsule 5  at PRN             Review of Systems:   A complete review of systems was performed and is negative except as noted in the HPI           Physical Exam:   /72 (BP Location: Left arm)  Pulse 65  Temp 97.1  F (36.2  C)  (Oral)  Resp 18  Wt 66.5 kg (146 lb 11.2 oz)  SpO2 99%  BMI 25.17 kg/m2  Wt:   Wt Readings from Last 2 Encounters:   04/20/17 66.5 kg (146 lb 11.2 oz)   02/19/17 67.6 kg (149 lb 0.5 oz)      Constitutional: cooperative, pleasant, not dyspneic/diaphoretic, no acute distress  Eyes: Sclera anicteric/injected  Ears/nose/mouth/throat: Normal oropharynx without ulcers or exudate, mucus membranes moist, hearing intact  Neck: supple, thyroid normal size  CV: No edema  Respiratory: Unlabored breathing  Lymph: No supraclavicular adenopathy  Abd: protuberant, firm, +bs, no hepatosplenomegaly, nontender, no peritoneal signs. Normal resonance to percussion. No masses.   Skin: warm, perfused, no jaundice, spider angioma on the chest. Xerosis in the anterior lower legs.   Neuro: AAO x 4, No asterixis  Psych: Normal affect  MSK: No gross deformities         Data:   Labs and imaging below were independently reviewed and interpreted    BMP  Recent Labs  Lab 04/21/17  0737 04/20/17 2338    142   POTASSIUM 3.8 3.0*   CHLORIDE 112* 106   NADJA 7.8* 7.8*   CO2 24 28   BUN 7 8   CR 0.75 0.88   * 218*     CBC  Recent Labs  Lab 04/20/17 2338   WBC 7.6   RBC 3.76*   HGB 12.5*   HCT 35.4*   MCV 94   MCH 33.2*   MCHC 35.3   RDW 14.3   PLT 83*     INR  Recent Labs  Lab 04/20/17 2338   INR 1.36*     LFTs  Recent Labs  Lab 04/20/17 2338   ALKPHOS 112   AST 24   ALT 31   BILITOTAL 0.9   PROTTOTAL 6.5*   ALBUMIN 2.6*      PANCNo lab results found in last 7 days.

## 2017-04-21 NOTE — H&P
[facility]      History and Physical  Ravindra Fregoso MRN: [id number]  1960  Date of Admission:4/20/2017  Primary care provider: Moo Flores           Chief Complaint:   Generalized weakness, hyperglycemia          History of Present Illness:   Ravindra Fregoso is a 56 yo with a history of EtOH cirrhosis c/b ascites, HE, and EV, osteoporosis, and pancreatic mass who is transferred from [facility] for hyperglycemia.     He was being seen in clinic earlier today for a pre-op appointment. States that he is scheduled to undergo EUS on 5/2 for evaluation of a pancreatic mass. The mass was recently seen on MRCP 3/8/2017, noted to be cystic in appearance possible pseudocyst.     While he was in clinic, he was getting up to leave the appointment and felt very weak. Noticed that his legs were shaking. Felt like he was going to pass out, but did not. Has had similar episodes a few times over the last couple of weeks. BG was significantly elevated at 612 and A1C  10.6. (Last A1c was 5.7 in 11/2016). He was sent to the ED for further evaluation. In the ED workup with normal B hydroxybutyrate at 0.2. UA with specific gravity < 1.005, glucose > 1000, negative ketones. ABG 7.39/37/75. CRP mildly elevated at 1.4. He underwent CT head as well which demonstrated 6 mm density in the right frontal lobe. MRI showed acute subcortical petechial type hemorrhage in the anterior paramedian right frontal lobe and mild perilesional edema. He was started on insulin gtt and received 1L NS. Transferred to [facility] for further management.     On arrival VSS. He states that over the last several months he has noticed feeling thirsty all the time. Also endorses frequent urination with nocturia up to ~6 times a night. He has had some generalized fatigue and weakness. No changes in weight. Denies any abdominal pain, recent illness, localized weakness or sensory changes.          Review of Systems:   10 point ROS negative except as  noted above.          Past Medical History:   Medical History reviewed.   Past Medical History:   Diagnosis Date     Cirrhosis of liver (H)     ETOH     Depression      Osteoporosis      Polyps, colonic            Past Surgical History:   Surgical History reviewed.   Past Surgical History:   Procedure Laterality Date     ESOPHAGOSCOPY, GASTROSCOPY, DUODENOSCOPY (EGD), COMBINED  11/2/2012    Procedure: COMBINED ESOPHAGOSCOPY, GASTROSCOPY, DUODENOSCOPY (EGD);;  Surgeon: Darell Stewart MD;  Location:  GI     ESOPHAGOSCOPY, GASTROSCOPY, DUODENOSCOPY (EGD), COMBINED  12/6/2013    Procedure: COMBINED ESOPHAGOSCOPY, GASTROSCOPY, DUODENOSCOPY (EGD);;  Surgeon: Darell Stewart MD;  Location: [facility] GI     ESOPHAGOSCOPY, GASTROSCOPY, DUODENOSCOPY (EGD), COMBINED N/A 12/17/2014    Procedure: COMBINED ESOPHAGOSCOPY, GASTROSCOPY, DUODENOSCOPY (EGD), BIOPSY SINGLE OR MULTIPLE;  Surgeon: Philippe Zhu MD;  Location:  GI     ESOPHAGOSCOPY, GASTROSCOPY, DUODENOSCOPY (EGD), COMBINED N/A 12/22/2016    Procedure: COMBINED ESOPHAGOSCOPY, GASTROSCOPY, DUODENOSCOPY (EGD);  Surgeon: Rich Haque MD;  Location:  GI     [facility] UGI ENDOSCOPY W EUS Left 5/4/2015    Procedure: COMBINED ENDOSCOPIC ULTRASOUND, ESOPHAGOSCOPY, GASTROSCOPY, DUODENOSCOPY (EGD);  Surgeon: Tremaine Sanches MD;  Location:  GI     HERNIORRHAPHY INGUINAL      bilateral repair     ORTHOPEDIC SURGERY      fx left leg, left hip      ORTHOPEDIC SURGERY      fx right ankle and right arm             Social History:   Social History reviewed.  Social History   Substance Use Topics     Smoking status: Current Every Day Smoker     Packs/day: 0.20     Types: Cigarettes     Smokeless tobacco: Never Used      Comment: started at age 13, as much as 1 ppd, no 4-5 cigs/day     Alcohol use 0.0 oz/week     0 Standard drinks or equivalent per week      Comment: Last drink ~Feb. 2016, 1-2 drinks at special occasions like weddings              Family History:   Family History reviewed.   Family History   Problem Relation Age of Onset     Liver Disease No family hx of              Allergies:     Allergies   Allergen Reactions     Atorvastatin Calcium      No Clinical Screening - See Comments      Platelets ( infusion)             Medications:   Medications Reviewed.   Current Facility-Administered Medications   Medication     bumetanide (BUMEX) tablet 1 mg     lactulose (CHRONULAC) solution 20 g     omeprazole (priLOSEC) CR capsule 20 mg     rifaximin (XIFAXAN) tablet 550 mg     naloxone (NARCAN) injection 0.1-0.4 mg     dextrose 10 % 1,000 mL infusion     insulin 1 unit/mL in saline (NovoLIN, HumuLIN Regular) drip - ADULT IV Infusion     glucose 40 % gel 15-30 g    Or     dextrose 50 % injection 25-50 mL    Or     glucagon injection 1 mg     aMILoride (MIDAMOR) tablet 10 mg     0.9% sodium chloride BOLUS             Physical Exam:   Vitals were reviewed.  Temp:  [98.1  F (36.7  C)] 98.1  F (36.7  C)  Pulse:  [75] 75  Resp:  [18] 18  BP: (141)/(82) 141/82  SpO2:  [96 %] 96 %    General: Well appearing, no acute distress  HEENT: PERRLA, EOMI  Neck: Supple  CV: RRR, normal S1S2, +3/6 systolic murmur  Resp: Clear to auscultation bilaterally, no wheezes or rhonchi  Abd: Soft, non-tender, BS+, no masses appreciated  Extremities: warm and well perfused, no LE edema  Neuro: AAOx3, CN intact, strength bilateral UE and LE 5/5, no gross sensory deficits, no asterixis  Skin: Excoriations over bilateral anterior shins  Psych: Appropriate mood and affect         Data:      ROUTINE LABS  CMPNo lab results found in last 7 days.  CBC    Recent Labs  Lab 04/20/17  2338   WBC 7.6   RBC 3.76*   HGB 12.5*   HCT 35.4*   MCV 94   MCH 33.2*   MCHC 35.3   RDW 14.3   PLT 83*     INRNo lab results found in last 7 days.    OSH  CT Head 4/20/17  FINDINGS:  1. Mild cerebral atrophy with mild chronic small vessel white matter ischemic changes.  2. In the right frontal white matter,  there is a 6 mm focus of higher density which was not appreciated on prior CT studies.  Appearance is suggestive of a small intracerebral hemorrhage.  No associated mass effect.  3. Plate and screws along the anterior wall of the right frontal sinus. No acute fractures. Visualized paranasal sinuses are clear.  4. Arterial calcification in the internal carotid arteries.  IMPRESSION:  6 mm higher density focus in the right frontal white matter.  Recommend MR for further evaluation to rule out underlying lesions such as cavernoma.    MRI 4/20/17  IMPRESSION:  1. No change given technical differences.   2. MRI findings are compatible with acute, sub cortical, petechial type hemorrhage situated in the anterior paramedian right frontal lobe.  This finding is better demonstrated on the CT.  The MRI shows mild perilesional edema.  3. No restricted diffusion and no pathologic intracranial enhancement.  4. Mild atrophy.    Assessment and Plan:     Ravindra Fregoso is a 58 yo with a history of EtOH cirrhosis c/b ascites, HE, and EV, osteoporosis, and pancreatic mass who is transferred from [facility] for hyperglycemia, with new diagnosis of diabetes mellitus.     # Hyperglycemia  # Diabetes mellitus  New diagnosis of diabetes mellitus. A1c 10.6. Presenting with significant hyperglycemia. No e/o DKA at OSH with negative serum and urine ketones. May be hyperosmolar hyperglycemic state, though no neurological sxs.  - Check serum osmolality   - Will continue on insulin gtt overnight to determine insulin needs  - Transition to subcutaneous insulin in AM  - IVF NS 1L at 250 mL/H  - Diabetes education consult given new diagnosis  - Consider checking c-peptide and insulin level     # Weakness/presyncope  Suspect likely 2/2 hypovolemia in setting of significant hyperglycemia. No focal deficits on neurological exam. No cardiac complaints.   - Check orthostatic BP  - IVF as above  - Fall precautions    # R frontal lobe hemorrhage  Found  on OSH CT and MRI. With acute, sub cortical, petechial type hemorrhage situated in the anterior paramedian right frontal lobe. No neurological deficits on exam.   - Neurology consult in AM to evaluate  - Will need to obtain OSH images to upload  - Neuro checks Q4H     # Pancreatic mass  Noted on OSH MRCP 3/8/17. Cystic appearance, possible pseudocyst per report. Also noted to have parenchymal calcifications and atrophy. Lipase WNL at OSH.   - Scheduled to undergo EUS (5/2) for further evaluation.     # EtOH cirrhosis  C/b ascites, HE, and EV  MELD-Na 10   - Continue PTA lactulose and rifaximin  - Continue PTA bumex and amiloride    # Osteoporosis  Takes alendronate QWeek.     Prophylaxis:  DVT: SCDs     Disposition: Pending management and evaluation as above.   Code Status: DNR/DNI    Patient was seen and discussed with attending physician Dr. Gann, who agrees with above assessment and plan.    Eva Orozco MD, MPH  Internal Medicine, PGY-3  Pager [phone]      Physician Attestation   I, Timoteo Gann saw this patient with the resident and agree with the resident s findings and plan of care as documented in the resident s note.      I personally reviewed vital signs, medications, labs and imaging.    Timoteo Gann  Date of Service (when I saw the patient): 4/20/17

## 2017-04-21 NOTE — PHARMACY-ADMISSION MEDICATION HISTORY
Admission medication history interview status for the 4/20/2017 admission is complete. See Epic admission navigator for allergy information, pharmacy, prior to admission medications and immunization status.     Medication history interview sources:  patient and care everywhere    Changes made to PTA medication list (reason)  Added: None  Deleted: Duplicate of amiloride  Changed: None    Additional medication history information (including reliability of information, actions taken by pharmacist):  -Patient was a good historian. Was able to recall medication names and last time administered  -Received the influenza vaccine in September 2016  -Takes alendronate once weekly on Mondays    Prior to Admission medications    Medication Sig Last Dose Taking? Auth Provider   aMILoride (MIDAMOR) 5 MG tablet Take 10 mg by mouth 2 times daily 4/20/2017 at AM Yes Unknown, Entered By History   bumetanide (BUMEX) 1 MG tablet Take 1 tablet (1 mg) by mouth daily 4/20/2017 at AM Yes Rich Haque MD   omeprazole (PRILOSEC) 20 MG CR capsule Take 1 capsule (20 mg) by mouth daily 4/20/2017 at AM Yes Rich Haque MD   rifaximin (XIFAXAN) 550 MG TABS tablet Take 1 tablet (550 mg) by mouth 2 times daily 4/20/2017 at AM Yes Mini Carey MD   ESCITALOPRAM OXALATE PO Take 20 mg by mouth daily 4/20/2017 at AM Yes Unknown, Entered By History   lactulose (CHRONULAC) 10 GM/15ML solution Take 20 g by mouth 2 times daily 4/20/2017 at AM Yes Unknown, Entered By History   potassium chloride gillian er (K-DUR) Take 20 mEq by mouth daily 4/20/2017 at AM  Unknown, Entered By History   alendronate (FOSAMAX) 70 MG tablet Take 70 mg by mouth every 7 days 4/17/2017 at AM  Reported, Patient   Multiple Vitamins-Minerals (MULTIVITAL) TABS Take 1 tablet by mouth 4/20/2017 at AM  Rich Haque MD   amoxicillin (AMOXIL) 500 MG capsule Take 4 capsules (2,000 mg) by mouth See Admin Instructions More than a month at Unknown time  [place]hett  Lili Santana NP     Medication history completed by:   Kiko Gallo, PharmD  PGY-1 Pharmacy Resident

## 2017-04-21 NOTE — PROGRESS NOTES
INTERNAL MEDICINE PROGRESS NOTE    Ravindra Fregoso ([id number]) admitted on 4/20/2017 04/21/2017    Assessment & Plan:  Ravindra Fregoso is a 58 yo with a history of EtOH cirrhosis c/b ascites, HE, and EV, osteoporosis, and pancreatic mass who is transferred from [facility] for hyperglycemia, with new diagnosis of diabetes mellitus.      # Hyperglycemia  # Diabetes mellitus  New diagnosis of diabetes mellitus. A1c 10.6. Presenting with significant hyperglycemia. No e/o DKA at OSH with negative serum and urine ketones. Ddx includes chronic pancreatitis w/ burnout vs glucagonoma vs DM2  - c peptide pending  - insulin antibody pending  - glutamic acid pending   - pancreatic elastase stool pending   - transition to sub Q insulin  - 15U lantus qNightly  - Medium SSI correction  - Diabetes education consult given new diagnosis    # Pancreatic mass  Noted on OSH MRCP 3/8/17. Ddx pseudocyst vs pancreatic malignancy. Lipase WNL at OSH.   - GI consult  - CT Abd/Pelv w/ contrast, pacnreatic study   - stool testing as above  - Scheduled to undergo EUS (5/2) for further evaluation.      # Weakness/presyncope  Suspect likely 2/2 hypovolemia in setting of significant hyperglycemia. No focal deficits on neurological exam. No cardiac complaints. Orthostatic wnl.  - Fall precautions  - Will keep malignancy on the differential      # R frontal lobe hemorrhage  Found on OSH CT and MRI. With acute, sub cortical, petechial type hemorrhage situated in the anterior paramedian right frontal lobe. No neurological deficits on exam.   - outpatient follow up w/ PCP  - Repeat HCT in 1 month to evaluate progression   - Neuro checks Q4H      # EtOH cirrhosis  C/b ascites, HE, and EV  MELD-Na 10   - Continue PTA lactulose and rifaximin  - Continue PTA bumex and amiloride     # Osteoporosis  Takes alendronate QWeek.      Prophylaxis: DVT: SCDs   Disposition: Pending management and evaluation as above.   Code Status: DNR/DNI     Patient was seen  and discussed with attending physician Dr. Gann, who agrees with above assessment and plan.    Grecia Mcintyre MD  [facility] PGY1  [phone]    ==================================================================    Subjective:  Overnight was kept on the insulin drip. This morning is eating breakfast, his wife is coming with the disks from [place]. He reports still feeling tired, mostly from not sleeping well.      ROS:  4 point review of systems is otherwise negative.     Objective:  Most recent vital signs:  /80 (BP Location: Left arm)  Pulse 64  Temp 95.7  F (35.4  C) (Oral)  Resp 18  Wt 66.5 kg (146 lb 11.2 oz)  SpO2 99%  BMI 25.17 kg/m2  Temp:  [95.7  F (35.4  C)-98.1  F (36.7  C)] 95.7  F (35.4  C)  Pulse:  [64-75] 64  Resp:  [18] 18  BP: (127-141)/(72-82) 135/80  SpO2:  [96 %-99 %] 99 %  Wt Readings from Last 2 Encounters:   04/20/17 66.5 kg (146 lb 11.2 oz)   02/19/17 67.6 kg (149 lb 0.5 oz)       Intake/Output Summary (Last 24 hours) at 04/21/17 1344  Last data filed at 04/21/17 0525   Gross per 24 hour   Intake             1120 ml   Output                0 ml   Net             1120 ml       Physical exam:  General: Well appearing, no acute distress  HEENT: PERRLA, EOMI  Neck: Supple  CV: RRR, normal S1S2, +3/6 systolic murmur  Resp: Clear to auscultation bilaterally, no wheezes or rhonchi  Abd: Soft, non-tender, BS+, no masses appreciated  Extremities: warm and well perfused, no LE edema  Neuro: AAOx3, CN intact, strength bilateral UE and LE 5/5, no gross sensory deficits, no asterixis  Skin: Excoriations over bilateral anterior shins  Psych: Appropriate mood and affect    Labs:  Glucoses 116 --> 288 ---> 369    Physician Attestation   I, Natanael Rios, saw this patient with the resident and agree with the resident s findings and plan of care as documented in the resident s note.      I personally reviewed vital signs, medications, labs and imaging.    Natanael Rios  Date of Service  (when I saw the patient): 4/21/17

## 2017-04-21 NOTE — CONSULTS
"Diabetes Education  Received consult request to see this 57 year old male for diabetes education.  Patient with history of ETOH cirrhosis complicated by esophageal varices, HE, ascites and chronic pancreatitis.  He was transferred from outside ED due to hyperglycemia, with glucose over 600 mg/dl and A1c of 10.6%.  Initiated on 10 units insulin glargine at hs and a very low resistance correction scale of insulin aspart at meals and hs.    Met with patient and wife.  Stated their local PCP had sent prescriptions for blood glucose monitor and supplies to [facility] pharmacy in [place] yesterday, but they were unable to  due to transfer to this hospital.  This educator called [facility]'s pharmacy and confirmed that an Accu-chek Christie monitor and supplies were filled.    Instructed patient and wife on use of an Accu-chek Christie Connect monitor and provided with kit with 10 test strips and 12 lancets in case they are discharged today and unable to get to [facility] before closing.    Discussed insulin regimen, concepts of long-acting and rapid-acting insulin.  Went over correction scales.  Wife able to identify correct insulin dose for patient's last BG reading.    Demonstrated insulin pen technique and injection technique.  Patient declined practicing, deferred to his wife.  She practiced and did well.  Discussed that Novopen Echo is reusable, insulin cartridge is replaced.  Demonstrated how this is replaced.  Discussed that Lantus pen is disposable, not reusable.  Provided with:  1.  [place] \"Understanding Diabetes\" booklet  2.  How to Use an Insulin Pen handout    They will benefit from further diabetes education as an outpatient.  Wife will check for resources in [place].    When discharged, will need prescriptions for:  1.  Insulin pen needles (4mm, 32 gauge)  2.  Sharps disposal container  3.  Alcohol swabs    Blood glucose monitor and supplies ready to  at [facility]'s pharmacy.    Alexa Null MS RN CDE " [facility]  [phone]

## 2017-04-21 NOTE — PLAN OF CARE
Problem: Goal Outcome Summary  Goal: Goal Outcome Summary  Outcome: No Change  RN assumed cares at 0700, Pt alert and oriented, VS stable throughout the shift.  Pt insulin drip discontinued, continues on glucose checks before meals with novolog.  Pt's last glucose was 288, 1 unit given.  Diabetes education came and spoke with the patient about his new diabetic diagnosis and insulin treatment.  Pt does not endorse any pain.  Pt on scheduled lactulose, has reported 2 stools this shift.  Adequate urine output.  Wife at the bedside is attentive to cares.  Pt expected to discharge today or tomorrow.  No acute incidents this shift.  Continue to monitor and notify MD of any changes.

## 2017-04-21 NOTE — PROGRESS NOTES
Writer called pt wife per pt request to ensure that she would bring the CD's of MRI and CT scan completed at [facility]. MD requested these items if able. Pt wife stated that she would try and get in contact with [facility] and obtain those and bring them when she comes to visit pt sometime this AM.

## 2017-04-22 ENCOUNTER — APPOINTMENT (OUTPATIENT)
Dept: CT IMAGING | Facility: CLINIC | Age: 57
DRG: 637 | End: 2017-04-22
Attending: INTERNAL MEDICINE
Payer: MEDICARE

## 2017-04-22 LAB
ALBUMIN SERPL-MCNC: 2.2 G/DL (ref 3.4–5)
ALBUMIN UR-MCNC: NEGATIVE MG/DL
ALP SERPL-CCNC: 102 U/L (ref 40–150)
ALT SERPL W P-5'-P-CCNC: 31 U/L (ref 0–70)
ANION GAP SERPL CALCULATED.3IONS-SCNC: 7 MMOL/L (ref 3–14)
APPEARANCE UR: CLEAR
AST SERPL W P-5'-P-CCNC: 34 U/L (ref 0–45)
BILIRUB SERPL-MCNC: 0.9 MG/DL (ref 0.2–1.3)
BILIRUB UR QL STRIP: NEGATIVE
BUN SERPL-MCNC: 8 MG/DL (ref 7–30)
CALCIUM SERPL-MCNC: 8.2 MG/DL (ref 8.5–10.1)
CHLORIDE SERPL-SCNC: 109 MMOL/L (ref 94–109)
CO2 SERPL-SCNC: 26 MMOL/L (ref 20–32)
COLOR UR AUTO: YELLOW
CREAT SERPL-MCNC: 0.87 MG/DL (ref 0.66–1.25)
DEPRECATED S PYO AG THROAT QL EIA: NORMAL
ERYTHROCYTE [DISTWIDTH] IN BLOOD BY AUTOMATED COUNT: 14.6 % (ref 10–15)
GFR SERPL CREATININE-BSD FRML MDRD: 90 ML/MIN/1.7M2
GLUCOSE BLDC GLUCOMTR-MCNC: 286 MG/DL (ref 70–99)
GLUCOSE BLDC GLUCOMTR-MCNC: 319 MG/DL (ref 70–99)
GLUCOSE BLDC GLUCOMTR-MCNC: 321 MG/DL (ref 70–99)
GLUCOSE BLDC GLUCOMTR-MCNC: 443 MG/DL (ref 70–99)
GLUCOSE SERPL-MCNC: 243 MG/DL (ref 70–99)
GLUCOSE UR STRIP-MCNC: 300 MG/DL
HCT VFR BLD AUTO: 32.5 % (ref 40–53)
HGB BLD-MCNC: 11.5 G/DL (ref 13.3–17.7)
HGB UR QL STRIP: NEGATIVE
INR PPP: 1.53 (ref 0.86–1.14)
KETONES UR STRIP-MCNC: NEGATIVE MG/DL
LEUKOCYTE ESTERASE UR QL STRIP: NEGATIVE
MAGNESIUM SERPL-MCNC: 1.5 MG/DL (ref 1.6–2.3)
MAGNESIUM SERPL-MCNC: 2.9 MG/DL (ref 1.6–2.3)
MCH RBC QN AUTO: 33.6 PG (ref 26.5–33)
MCHC RBC AUTO-ENTMCNC: 35.4 G/DL (ref 31.5–36.5)
MCV RBC AUTO: 95 FL (ref 78–100)
MICRO REPORT STATUS: NORMAL
MRSA DNA SPEC QL NAA+PROBE: NORMAL
MUCOUS THREADS #/AREA URNS LPF: PRESENT /LPF
NITRATE UR QL: NEGATIVE
PH UR STRIP: 6 PH (ref 5–7)
PHOSPHATE SERPL-MCNC: 3.2 MG/DL (ref 2.5–4.5)
PLATELET # BLD AUTO: 78 10E9/L (ref 150–450)
POTASSIUM SERPL-SCNC: 3.7 MMOL/L (ref 3.4–5.3)
PROT SERPL-MCNC: 6 G/DL (ref 6.8–8.8)
RBC # BLD AUTO: 3.42 10E12/L (ref 4.4–5.9)
RBC #/AREA URNS AUTO: 2 /HPF (ref 0–2)
SODIUM SERPL-SCNC: 142 MMOL/L (ref 133–144)
SP GR UR STRIP: 1.03 (ref 1–1.03)
SPECIMEN SOURCE: NORMAL
SPECIMEN SOURCE: NORMAL
URN SPEC COLLECT METH UR: ABNORMAL
UROBILINOGEN UR STRIP-MCNC: NORMAL MG/DL (ref 0–2)
WBC # BLD AUTO: 6.6 10E9/L (ref 4–11)
WBC #/AREA URNS AUTO: 1 /HPF (ref 0–2)

## 2017-04-22 PROCEDURE — 85610 PROTHROMBIN TIME: CPT | Performed by: STUDENT IN AN ORGANIZED HEALTH CARE EDUCATION/TRAINING PROGRAM

## 2017-04-22 PROCEDURE — 70491 CT SOFT TISSUE NECK W/DYE: CPT

## 2017-04-22 PROCEDURE — 85027 COMPLETE CBC AUTOMATED: CPT | Performed by: STUDENT IN AN ORGANIZED HEALTH CARE EDUCATION/TRAINING PROGRAM

## 2017-04-22 PROCEDURE — 25000132 ZZH RX MED GY IP 250 OP 250 PS 637: Mod: GY | Performed by: STUDENT IN AN ORGANIZED HEALTH CARE EDUCATION/TRAINING PROGRAM

## 2017-04-22 PROCEDURE — 87880 STREP A ASSAY W/OPTIC: CPT | Performed by: STUDENT IN AN ORGANIZED HEALTH CARE EDUCATION/TRAINING PROGRAM

## 2017-04-22 PROCEDURE — 81001 URINALYSIS AUTO W/SCOPE: CPT | Performed by: STUDENT IN AN ORGANIZED HEALTH CARE EDUCATION/TRAINING PROGRAM

## 2017-04-22 PROCEDURE — 83735 ASSAY OF MAGNESIUM: CPT | Performed by: INTERNAL MEDICINE

## 2017-04-22 PROCEDURE — 25500064 ZZH RX 255 OP 636: Performed by: INTERNAL MEDICINE

## 2017-04-22 PROCEDURE — A9270 NON-COVERED ITEM OR SERVICE: HCPCS | Mod: GY | Performed by: STUDENT IN AN ORGANIZED HEALTH CARE EDUCATION/TRAINING PROGRAM

## 2017-04-22 PROCEDURE — 87040 BLOOD CULTURE FOR BACTERIA: CPT | Performed by: STUDENT IN AN ORGANIZED HEALTH CARE EDUCATION/TRAINING PROGRAM

## 2017-04-22 PROCEDURE — 25000128 H RX IP 250 OP 636: Performed by: INTERNAL MEDICINE

## 2017-04-22 PROCEDURE — 83735 ASSAY OF MAGNESIUM: CPT | Performed by: STUDENT IN AN ORGANIZED HEALTH CARE EDUCATION/TRAINING PROGRAM

## 2017-04-22 PROCEDURE — A9270 NON-COVERED ITEM OR SERVICE: HCPCS | Mod: GY | Performed by: INTERNAL MEDICINE

## 2017-04-22 PROCEDURE — 84100 ASSAY OF PHOSPHORUS: CPT | Performed by: STUDENT IN AN ORGANIZED HEALTH CARE EDUCATION/TRAINING PROGRAM

## 2017-04-22 PROCEDURE — 25000128 H RX IP 250 OP 636: Performed by: PEDIATRICS

## 2017-04-22 PROCEDURE — 40000141 ZZH STATISTIC PERIPHERAL IV START W/O US GUIDANCE

## 2017-04-22 PROCEDURE — 36415 COLL VENOUS BLD VENIPUNCTURE: CPT | Performed by: STUDENT IN AN ORGANIZED HEALTH CARE EDUCATION/TRAINING PROGRAM

## 2017-04-22 PROCEDURE — 12000001 ZZH R&B MED SURG/OB UMMC

## 2017-04-22 PROCEDURE — 87641 MR-STAPH DNA AMP PROBE: CPT | Performed by: STUDENT IN AN ORGANIZED HEALTH CARE EDUCATION/TRAINING PROGRAM

## 2017-04-22 PROCEDURE — 87640 STAPH A DNA AMP PROBE: CPT | Performed by: STUDENT IN AN ORGANIZED HEALTH CARE EDUCATION/TRAINING PROGRAM

## 2017-04-22 PROCEDURE — 00000146 ZZHCL STATISTIC GLUCOSE BY METER IP

## 2017-04-22 PROCEDURE — 36415 COLL VENOUS BLD VENIPUNCTURE: CPT | Performed by: INTERNAL MEDICINE

## 2017-04-22 PROCEDURE — 99233 SBSQ HOSP IP/OBS HIGH 50: CPT | Mod: GC | Performed by: INTERNAL MEDICINE

## 2017-04-22 PROCEDURE — 80053 COMPREHEN METABOLIC PANEL: CPT | Performed by: STUDENT IN AN ORGANIZED HEALTH CARE EDUCATION/TRAINING PROGRAM

## 2017-04-22 PROCEDURE — 87081 CULTURE SCREEN ONLY: CPT | Performed by: STUDENT IN AN ORGANIZED HEALTH CARE EDUCATION/TRAINING PROGRAM

## 2017-04-22 PROCEDURE — 25000132 ZZH RX MED GY IP 250 OP 250 PS 637: Mod: GY | Performed by: INTERNAL MEDICINE

## 2017-04-22 RX ORDER — ACETAMINOPHEN 325 MG/1
650 TABLET ORAL EVERY 6 HOURS PRN
Status: DISCONTINUED | OUTPATIENT
Start: 2017-04-22 | End: 2017-04-23 | Stop reason: HOSPADM

## 2017-04-22 RX ORDER — SODIUM CHLORIDE 9 MG/ML
INJECTION, SOLUTION INTRAVENOUS ONCE
Status: COMPLETED | OUTPATIENT
Start: 2017-04-22 | End: 2017-04-22

## 2017-04-22 RX ORDER — PIPERACILLIN SODIUM, TAZOBACTAM SODIUM 3; .375 G/15ML; G/15ML
3.38 INJECTION, POWDER, LYOPHILIZED, FOR SOLUTION INTRAVENOUS EVERY 6 HOURS
Status: DISCONTINUED | OUTPATIENT
Start: 2017-04-22 | End: 2017-04-23

## 2017-04-22 RX ORDER — IOPAMIDOL 755 MG/ML
75 INJECTION, SOLUTION INTRAVASCULAR ONCE
Status: COMPLETED | OUTPATIENT
Start: 2017-04-22 | End: 2017-04-22

## 2017-04-22 RX ADMIN — INSULIN ASPART 3 UNITS: 100 INJECTION, SOLUTION INTRAVENOUS; SUBCUTANEOUS at 09:26

## 2017-04-22 RX ADMIN — AMILORIDE HYDROCLORIDE 10 MG: 5 TABLET ORAL at 09:26

## 2017-04-22 RX ADMIN — BUMETANIDE 1 MG: 1 TABLET ORAL at 09:26

## 2017-04-22 RX ADMIN — INSULIN ASPART 4 UNITS: 100 INJECTION, SOLUTION INTRAVENOUS; SUBCUTANEOUS at 12:27

## 2017-04-22 RX ADMIN — ACETAMINOPHEN 650 MG: 325 TABLET, FILM COATED ORAL at 22:14

## 2017-04-22 RX ADMIN — RIFAXIMIN 550 MG: 550 TABLET ORAL at 21:30

## 2017-04-22 RX ADMIN — SODIUM CHLORIDE, PRESERVATIVE FREE 60 ML: 5 INJECTION INTRAVENOUS at 23:06

## 2017-04-22 RX ADMIN — PIPERACILLIN SODIUM,TAZOBACTAM SODIUM 3.38 G: 3; .375 INJECTION, POWDER, FOR SOLUTION INTRAVENOUS at 13:52

## 2017-04-22 RX ADMIN — OMEPRAZOLE 20 MG: 20 CAPSULE, DELAYED RELEASE ORAL at 09:26

## 2017-04-22 RX ADMIN — RIFAXIMIN 550 MG: 550 TABLET ORAL at 09:26

## 2017-04-22 RX ADMIN — LACTULOSE 20 G: 10 SOLUTION ORAL at 21:31

## 2017-04-22 RX ADMIN — PIPERACILLIN SODIUM,TAZOBACTAM SODIUM 3.38 G: 3; .375 INJECTION, POWDER, FOR SOLUTION INTRAVENOUS at 06:42

## 2017-04-22 RX ADMIN — MAGNESIUM SULFATE IN WATER 4 G: 40 INJECTION, SOLUTION INTRAVENOUS at 10:56

## 2017-04-22 RX ADMIN — LACTULOSE 20 G: 10 SOLUTION ORAL at 09:31

## 2017-04-22 RX ADMIN — AMILORIDE HYDROCLORIDE 10 MG: 5 TABLET ORAL at 21:30

## 2017-04-22 RX ADMIN — IOPAMIDOL 100 ML: 755 INJECTION, SOLUTION INTRAVENOUS at 23:05

## 2017-04-22 RX ADMIN — PIPERACILLIN SODIUM,TAZOBACTAM SODIUM 3.38 G: 3; .375 INJECTION, POWDER, FOR SOLUTION INTRAVENOUS at 21:28

## 2017-04-22 NOTE — PROVIDER NOTIFICATION
Call received from Reyna COLVIN.  Culture from left ac drawn on 4/20 anaerobics culture was gram positive with cocci,

## 2017-04-22 NOTE — PROGRESS NOTES
Brief Gi Note    CT reviewed with staff. Small area of necrosis unchanged in size. No GI symptoms. Do not believe hyperglycemia is related to pancreas insufficiency. No current plans to intervene. Plan to follow up in clinic.       Philippe Lazo MD  GI Fellow

## 2017-04-22 NOTE — PLAN OF CARE
Assumed cares at 2300. A/o x 3. Up IND.  during night- told to notify MD if >300. Pt slept most of night. Denies pain. UA sent. L PIV SL. Pt started on Lantus last evening. Stool spec sent on eves- pending. DM educator did visit with pt on previous shift. P: Possible D/c today. Cont to monitor BG levels- Allow pt to help with BG check and Insulin admin if applicable. Call light within reach. Will continue to monitor.

## 2017-04-22 NOTE — PLAN OF CARE
Problem: Goal Outcome Summary  Goal: Goal Outcome Summary  Outcome: No Change  Blood glucose levels >300 all evening. Provider requested blood glucose be checked at midnight and if remains >300 to report to MD for additional insulin coverage. Lantus started this evening. Pt needs additional review for insulin administration. Encourage and assist with proper demonstration and administration. Up in room independently. Abdominal CT done this evening.Pt states possible D/c home tomorrow. Continue to monitor blood glucose levels.

## 2017-04-22 NOTE — PROVIDER NOTIFICATION
Pt woke up with swelling on R side of neck, visible per writer. Some pain in throat per pt. Wondering if you can come and assess. Jenny [phone] notified. MD came to assess pt and stated that we should continue with Heat/Ice packs and she would notify day team and go from there. Will inform oncoming nurse and continue to monitor Pt.

## 2017-04-22 NOTE — PROGRESS NOTES
Called CT to check status of scan, ordered as STAT. CT unable to take pt at this time, stated multiple STAT ER CT's. Scan most likely to be done after 8pm per CT staff.

## 2017-04-22 NOTE — PLAN OF CARE
Problem: Goal Outcome Summary  Goal: Goal Outcome Summary  Outcome: Improving  D: Magnesium replaced per protocol. Occas. Sore throat, some neck swelling continues. Rapid strep done and is negative, also nasal MRSA done. Up to bathroom himself, alert and oriented. Review of diabetis care needed.

## 2017-04-22 NOTE — PROGRESS NOTES
INTERNAL MEDICINE PROGRESS NOTE    Ravindra Fregoso ([id number]) admitted on 4/20/2017 04/22/2017    Assessment & Plan:  Ravindra Fregoso is a 58 yo with a history of EtOH cirrhosis c/b ascites, HE, and EV, osteoporosis, and pancreatic mass who is transferred from [facility] for hyperglycemia, with new diagnosis of diabetes mellitus.      # Hyperglycemia  # Diabetes mellitus  New diagnosis of diabetes mellitus. A1c 10.6. Presenting with significant hyperglycemia. No e/o DKA at OSH with negative serum and urine ketones. Ddx includes chronic pancreatitis w/ burnout vs glucagonoma vs DM2  - c peptide pending  - insulin antibody pending  - glutamic acid pending   - pancreatic elastase stool pending   - Increase to 22U lantus qNightly  - High SSI correction  - Diabetes education consult given new diagnosis    # Pancreatic mass  Noted on OSH MRCP 3/8/17. Abd imaging demonstrating stable pancreatic silvina most consistent with walled off necrosis. Other finding consistent with sequelae of chronic pancreatis.   - GI consult  - outpt GI follow up   - replace insulin, enzymes if indicated   - stool testing as above  - Scheduled to undergo EUS (5/2) for further evaluation.      #Positve Bld Cutlure: Growing GPC on aerobic Cx at OSH. Likely contaminant, pt hemodynamically stable, nonseptic.   -- zosyn q6hr   -- recheck cxs tomorrow    #Bilateral Submandibular Swelling: Acute process concerning for inflammation versus infection. Infectious causes being viral, mumps, bacterial vs. Inflammatory w/ blocked submandibular glands.   -- CT neck w/ constrast  -- potential ENT consult if needed for stone removal (acute expansion)   -- tylenol prn  -- lemon drops  -- icepacks/heat packs       # R frontal lobe hemorrhage  Found on OSH CT and MRI. With acute, sub cortical, petechial type hemorrhage situated in the anterior paramedian right frontal lobe. No neurological deficits on exam.   - outpatient follow up w/ PCP  - Repeat HCT in 1  month to evaluate progression   - Neuro checks Q4H      # EtOH cirrhosis  C/b ascites, HE, and EV  MELD-Na 10   - Continue PTA lactulose and rifaximin  - Continue PTA bumex and amiloride     # Osteoporosis  Takes alendronate QWeek.      Prophylaxis: DVT: SCDs   Disposition: Pending management and evaluation as above.   Code Status: DNR/DNI     Patient was seen and discussed with attending physician Dr. Gann, who agrees with above assessment and plan.    Grecia Mcintyre MD  [facility] PGY1  [phone]    ==================================================================    Subjective:  No acute events overnight. BGs high in the 300s. Noted to have acute worsening of swollen glands overnight, no sore throat, no trouble breathing.        ROS:  4 point review of systems is otherwise negative.     Objective:  Most recent vital signs:  /80 (BP Location: Right arm)  Pulse 64  Temp 97.3  F (36.3  C) (Oral)  Resp 18  Wt 69.5 kg (153 lb 3.2 oz)  SpO2 97%  BMI 26.28 kg/m2  Temp:  [96.7  F (35.9  C)-97.9  F (36.6  C)] 97.3  F (36.3  C)  Pulse:  [64-70] 64  Resp:  [18] 18  BP: (138-157)/(70-86) 146/80  SpO2:  [94 %-97 %] 97 %  Wt Readings from Last 2 Encounters:   04/21/17 69.5 kg (153 lb 3.2 oz)   02/19/17 67.6 kg (149 lb 0.5 oz)       Intake/Output Summary (Last 24 hours) at 04/21/17 1344  Last data filed at 04/21/17 0525   Gross per 24 hour   Intake             1120 ml   Output                0 ml   Net             1120 ml       Physical exam:  General: Well appearing, no acute distress  HEENT: PERRLA, EOMI, oropharynx mildly erythematous, no pus or exudate   Neck: B/l mobile, mildly tender 3cm soft anterior cervical masses, no other LAD.   CV: RRR, normal S1S2, +3/6 systolic murmur  Resp: Clear to auscultation bilaterally, no wheezes or rhonchi  Abd: Soft, non-tender, BS+, no masses appreciated  Extremities: warm and well perfused, no LE edema  Neuro: AAOx3, CN intact, strength bilateral UE and LE 5/5, no gross  sensory deficits, no asterixis  Skin: Excoriations over bilateral anterior shins  Psych: Appropriate mood and affect    Labs:  Glucoses 200- 300      Physician Attestation   I, Natanael Rios, saw this patient with the resident and agree with the resident s findings and plan of care as documented in the resident s note.      I personally reviewed vital signs, medications, labs and imaging.      Natanael Rios  Date of Service (when I saw the patient): 4/22/17

## 2017-04-23 VITALS
BODY MASS INDEX: 25.04 KG/M2 | OXYGEN SATURATION: 98 % | DIASTOLIC BLOOD PRESSURE: 80 MMHG | HEART RATE: 56 BPM | SYSTOLIC BLOOD PRESSURE: 134 MMHG | WEIGHT: 145.94 LBS | TEMPERATURE: 96.8 F | RESPIRATION RATE: 16 BRPM

## 2017-04-23 LAB
ALBUMIN SERPL-MCNC: 2.2 G/DL (ref 3.4–5)
ALP SERPL-CCNC: 95 U/L (ref 40–150)
ALT SERPL W P-5'-P-CCNC: 28 U/L (ref 0–70)
ANION GAP SERPL CALCULATED.3IONS-SCNC: 8 MMOL/L (ref 3–14)
AST SERPL W P-5'-P-CCNC: 30 U/L (ref 0–45)
BILIRUB SERPL-MCNC: 1.6 MG/DL (ref 0.2–1.3)
BUN SERPL-MCNC: 9 MG/DL (ref 7–30)
CALCIUM SERPL-MCNC: 7.6 MG/DL (ref 8.5–10.1)
CHLORIDE SERPL-SCNC: 107 MMOL/L (ref 94–109)
CO2 SERPL-SCNC: 24 MMOL/L (ref 20–32)
CREAT SERPL-MCNC: 0.95 MG/DL (ref 0.66–1.25)
ERYTHROCYTE [DISTWIDTH] IN BLOOD BY AUTOMATED COUNT: 14.6 % (ref 10–15)
GFR SERPL CREATININE-BSD FRML MDRD: 81 ML/MIN/1.7M2
GLUCOSE BLDC GLUCOMTR-MCNC: 169 MG/DL (ref 70–99)
GLUCOSE BLDC GLUCOMTR-MCNC: 223 MG/DL (ref 70–99)
GLUCOSE SERPL-MCNC: 120 MG/DL (ref 70–99)
HCT VFR BLD AUTO: 32.6 % (ref 40–53)
HGB BLD-MCNC: 11.4 G/DL (ref 13.3–17.7)
INR PPP: 1.49 (ref 0.86–1.14)
MAGNESIUM SERPL-MCNC: 2.1 MG/DL (ref 1.6–2.3)
MCH RBC QN AUTO: 33.4 PG (ref 26.5–33)
MCHC RBC AUTO-ENTMCNC: 35 G/DL (ref 31.5–36.5)
MCV RBC AUTO: 96 FL (ref 78–100)
PLATELET # BLD AUTO: 76 10E9/L (ref 150–450)
POTASSIUM SERPL-SCNC: 3.4 MMOL/L (ref 3.4–5.3)
PROT SERPL-MCNC: 5.6 G/DL (ref 6.8–8.8)
RBC # BLD AUTO: 3.41 10E12/L (ref 4.4–5.9)
SODIUM SERPL-SCNC: 139 MMOL/L (ref 133–144)
WBC # BLD AUTO: 6.8 10E9/L (ref 4–11)

## 2017-04-23 PROCEDURE — 85027 COMPLETE CBC AUTOMATED: CPT | Performed by: INTERNAL MEDICINE

## 2017-04-23 PROCEDURE — 83735 ASSAY OF MAGNESIUM: CPT | Performed by: INTERNAL MEDICINE

## 2017-04-23 PROCEDURE — 80053 COMPREHEN METABOLIC PANEL: CPT | Performed by: INTERNAL MEDICINE

## 2017-04-23 PROCEDURE — A9270 NON-COVERED ITEM OR SERVICE: HCPCS | Mod: GY | Performed by: INTERNAL MEDICINE

## 2017-04-23 PROCEDURE — 25000132 ZZH RX MED GY IP 250 OP 250 PS 637: Mod: GY | Performed by: MARRIAGE & FAMILY THERAPIST

## 2017-04-23 PROCEDURE — 25000132 ZZH RX MED GY IP 250 OP 250 PS 637: Mod: GY | Performed by: INTERNAL MEDICINE

## 2017-04-23 PROCEDURE — 36415 COLL VENOUS BLD VENIPUNCTURE: CPT | Performed by: INTERNAL MEDICINE

## 2017-04-23 PROCEDURE — 86735 MUMPS ANTIBODY: CPT | Performed by: INTERNAL MEDICINE

## 2017-04-23 PROCEDURE — 00000146 ZZHCL STATISTIC GLUCOSE BY METER IP

## 2017-04-23 PROCEDURE — 40000957 ZZHCL STATISTIC MUMPS VIRUS PCR: Performed by: INTERNAL MEDICINE

## 2017-04-23 PROCEDURE — 84681 ASSAY OF C-PEPTIDE: CPT | Performed by: INTERNAL MEDICINE

## 2017-04-23 PROCEDURE — 99238 HOSP IP/OBS DSCHRG MGMT 30/<: CPT | Mod: GC | Performed by: INTERNAL MEDICINE

## 2017-04-23 PROCEDURE — 85610 PROTHROMBIN TIME: CPT | Performed by: INTERNAL MEDICINE

## 2017-04-23 PROCEDURE — 25000128 H RX IP 250 OP 636: Performed by: PEDIATRICS

## 2017-04-23 RX ORDER — IBUPROFEN 200 MG
600 TABLET ORAL EVERY 6 HOURS PRN
Status: DISCONTINUED | OUTPATIENT
Start: 2017-04-23 | End: 2017-04-23 | Stop reason: HOSPADM

## 2017-04-23 RX ORDER — IBUPROFEN 600 MG/1
600 TABLET, FILM COATED ORAL EVERY 6 HOURS PRN
Qty: 20 TABLET | Refills: 0 | Status: SHIPPED | OUTPATIENT
Start: 2017-04-23 | End: 2017-04-28

## 2017-04-23 RX ORDER — CONTAINER,EMPTY
1 EACH MISCELLANEOUS 4 TIMES DAILY PRN
Qty: 1 EACH | Refills: 1 | Status: SHIPPED | OUTPATIENT
Start: 2017-04-23

## 2017-04-23 RX ADMIN — BUMETANIDE 1 MG: 1 TABLET ORAL at 08:10

## 2017-04-23 RX ADMIN — OMEPRAZOLE 20 MG: 20 CAPSULE, DELAYED RELEASE ORAL at 08:10

## 2017-04-23 RX ADMIN — AMILORIDE HYDROCLORIDE 10 MG: 5 TABLET ORAL at 08:10

## 2017-04-23 RX ADMIN — BENZOCAINE: 220 SPRAY, METERED PERIODONTAL at 06:17

## 2017-04-23 RX ADMIN — LACTULOSE 20 G: 10 SOLUTION ORAL at 08:10

## 2017-04-23 RX ADMIN — RIFAXIMIN 550 MG: 550 TABLET ORAL at 08:10

## 2017-04-23 RX ADMIN — Medication 1 LOZENGE: at 08:10

## 2017-04-23 RX ADMIN — CEPHALEXIN 250 MG: 250 CAPSULE ORAL at 11:04

## 2017-04-23 RX ADMIN — IBUPROFEN 600 MG: 200 TABLET, FILM COATED ORAL at 11:04

## 2017-04-23 RX ADMIN — PIPERACILLIN SODIUM,TAZOBACTAM SODIUM 3.38 G: 3; .375 INJECTION, POWDER, FOR SOLUTION INTRAVENOUS at 03:42

## 2017-04-23 NOTE — PLAN OF CARE
Problem: Goal Outcome Summary  Goal: Goal Outcome Summary  Outcome: Improving  D: Patient with stable glucose and swollen neck discomfort under control. Review of discharge plans done with patient and his wife. Review of medications done also, patient also gave part of insulin dose at noon to himself. Left with belongings via wheelchair with family.IV out.

## 2017-04-23 NOTE — PROGRESS NOTES
Called CT to check status of CT scan. Staff stated they were unable to take pt currently as they were with another pt at that time and that they would call back later.

## 2017-04-23 NOTE — PLAN OF CARE
Problem: Goal Outcome Summary  Goal: Goal Outcome Summary  Outcome: No Change  Hyperglycemia. . Pt is A&Ox4. AVSS on RA. Pt c/o throat pain and painful swallowing, but has no difficulty breathing. Hurricane spray ordered, awaiting arrival from pharmacy. Up ad amaury.     Continue to monitor throat swelling and BG. Provide diabetes education.

## 2017-04-23 NOTE — PLAN OF CARE
Problem: Goal Outcome Summary  Goal: Goal Outcome Summary  Outcome: No Change  Continues with hyperglycemia. Blood glucose levels 443 and 319. Lantus dose increased. C/o painful swallowing and pain in throat. No difficulties breathing. CT scan completed 2300. Needs continued review of diabetes education. Up in room independently. Showered with assist from wife. Continue to monitor blood glucose levels. Provide diabetes education.

## 2017-04-23 NOTE — PROGRESS NOTES
Called CT to check when CT scan would be done. Staff stated they were busy and would not be able to get to pt until at least after 1630. Asked staff if pt would be ok to shower now. CT staff stated they wouldn't get to him for a while so it would be ok. Awaiting call back from CT about when CT scan would be able to be done.

## 2017-04-23 NOTE — PLAN OF CARE
Problem: Goal Outcome Summary  Goal: Goal Outcome Summary  Outcome: Improving  D: Update with designated care provider after discharge is wife Marilyn.

## 2017-04-23 NOTE — DISCHARGE SUMMARY
Medicine Discharge Summary  Ravindra Fregoso MRN: [id number]  1960  Primary care provider: Moo Flores  ___________________________________          Date of Admission:  4/20/2017  Date of Discharge:  4/23/2017   Admitting Physician:  Timoteo Gann MD  Discharge Physician:  Natanael Rios MD  Discharging Service:  Internal Medicine, [place] [address]     Primary Provider: Moo Flores         Things to follow up post hospitalization:   -- GI as schedule by GI coordinator for chronic pancreatitis (Tentative EUS 5/2)    Review stool elastase  -- PCP in 1 week for new diabetes, subacute stroke and submandibular gland swelling   Review insulin antibody and C peptide   Arrange follow up CT head in 1 month   Review mumps labs         Reason for Admission:   Ravindra Fregoso is a 56 yo with a history of EtOH cirrhosis c/b ascites, HE, and EV, osteoporosis, and pancreatic mass who is transferred from [facility] for hyperglycemia, with new diagnosis of diabetes mellitus.               Discharge Diagnosis:   # Hyperglycemia  # Diabetes mellitus  # Chronic pancreatitis with Pancreatic mass (wall of necrosis)   # Concern for pancreatic insufficiency  # Positve Bld culture at OSH 2/2 contamination  # Bilateral Submandibular Swelling    # R frontal lobe hemorrhage  # EtOH cirrhosis  # Osteoporosis         Procedures & Significant Findings:   CT 4/21  IMPRESSION:   1. Hypodense lesion centered in the pancreatic body/uncinate process  not significantly changed compared to 3/20/2017 or 2/15/2017. Favor   walled off necrosis. Sequelae of chronic pancreatitis with extensive  parenchymal calcifications, atrophy, and ductal dilatation.  2. Cirrhotic appearing liver with evidence of portal hypertension  including ascites and portosystemic collaterals.  3. Cholelithiasis.     CT neck 4/22  Impression: Enlarged heterogeneously enhancing submandibular glands  are  most consistent with sialadenitis. No evidence of sialolithiasis.            Consultations:   GI  Diabetic education         Hospital Course by Problem:    # Hyperglycemia  # Diabetes mellitus  Last A1c was 5.7 in 11/2016. Presented with BG at 612 and A1C 10.6. No e/o DKA at OSH with negative serum and urine ketones. No evidence of glucagonoma from pancreatic CT. Ddx includes chronic pancreatitis w/ burnout vs DM2  C peptide, insulin antibody and glutamic acid pending. Was on insulin gtt and transition to SQ, BG 100s-400s. Pt had diabetes education and discharged with 22U lantus q Nightly and high SSI correction.     # Pancreatic mass  # Concern for pancreatic enzyme insufficiency  Pancreatic mass noted on OSH MRCP 3/8/17.  Repeat CT pancrease here, most consistent with walled off necrosis and finding consistent with sequelae of chronic pancreatis.   GI consult, plan outpt GI follow up. He has several loose stools/day. Elastase stool testing sent to [place]. Will replace pancreatic enzyme with Creon 24 TID with meal until seen by GI.  Tentative scheduled to undergo EUS (5/2) for further evaluation.       #Positve Bld Cutlure:   Growing GPC,Stap coag neg at OSH. Likely contaminant, pt hemodynamically stable, nonseptic. Empirical treated with Zosyn and DC after culture returned Coag neg stap. Repeat blood culture here NGTD.    #Bilateral Submandibular Swelling:   Acute onset 12 hours after CT pancrease.  CT showed siliadinitis. No stone. No fever/constitutional symptoms/ sick contact. Most likely contrast induce siliadinitis. DDx infection viral, mumps, bacterial.  Plan Cephalexin for 7 days, NSAIDS, tylenol prn, lemon drops and heat packs. Anticipate improvement, if not need to be seen by ENT.     # R frontal lobe hemorrhage  Found on OSH CT and MRI. With acute, sub cortical, petechial type hemorrhage situated in the anterior paramedian right frontal lobe. No neurological deficits on exam. Given very small size and no  clinical, neurology recs outpatient follow up w/ PCP and Repeat HCT in 1 month to evaluate progression       # EtOH cirrhosis  C/b ascites, HE, and EV  MELD-Na 10. Continue PTA lactulose, rifaximin, bumex and amiloride      # Osteoporosis  Takes alendronate QWeek.     Physical Exam on day of Discharge:  Blood pressure 134/80, pulse 56, temperature 96.8  F (36  C), temperature source Oral, resp. rate 16, weight 66.2 kg (145 lb 15.1 oz), SpO2 98 %.  GENERAL APPEARANCE: alert and no distress  HEENT: No pale conjunctivae, No icteric sclerae, No JVD, bilateral submandibular gland swelling and tenderness, no discharge, mild injected pharynx, no exudate  Chest: lungs clear to auscultation with equal breath sounds bilaterally, no clubbing or cyanosis  CV: Normal S1,S2, regular rhythm, normal rate, no rub  Abdomen: soft, nontender, no HSM   Ext: No edema, no rash  NEURO: mentation intact and speech normal    Lines/Tubes:  NA         Pending Results:   -- stool elastase  -- insulin antibody and C peptide  -- mumps labs   -- blood culture         Discharge Medications:     Current Discharge Medication List      START taking these medications    Details   !! insulin aspart (NOVOLOG PEN) 100 UNIT/ML injection Inject 1-10 Units Subcutaneous 3 times daily (before meals)  Qty: 3 mL, Refills: 1    Associated Diagnoses: Diabetes mellitus without complication (H)      !! insulin aspart (NOVOLOG PEN) 100 UNIT/ML injection Inject 1-7 Units Subcutaneous At Bedtime  Qty: 3 mL, Refills: 1    Associated Diagnoses: Diabetes mellitus without complication (H)      insulin glargine (LANTUS) 100 UNIT/ML injection Inject 22 Units Subcutaneous At Bedtime  Qty: 3 mL, Refills: 3    Associated Diagnoses: Diabetes mellitus without complication (H)      cephalexin (KEFLEX) 250 MG capsule Take 1 capsule (250 mg) by mouth every 6 hours for 7 days  Qty: 28 capsule, Refills: 0    Associated Diagnoses: Salivary gland infection      Benzocaine  (HURRICAINE/TOPEX) 20 % AERO spray Take 1 mL by mouth every 3 hours as needed for moderate pain  Qty: 1 each, Refills: 0    Associated Diagnoses: Throat pain      benzocaine-menthol (CHLORASEPTIC) 6-10 MG lozenge Place 1 lozenge inside cheek every hour as needed for sore throat  Qty: 84 lozenge, Refills: 0    Associated Diagnoses: Throat pain      ibuprofen (ADVIL/MOTRIN) 600 MG tablet Take 1 tablet (600 mg) by mouth every 6 hours as needed for moderate pain  Qty: 20 tablet, Refills: 0    Associated Diagnoses: Disturbance of salivary secretion      amylase-lipase-protease (CREON 24) 46040 UNITS CPEP per EC capsule Take 1 capsule (24,000 Units) by mouth 3 times daily (with meals)  Qty: 60 capsule, Refills: 1    Associated Diagnoses: Pancreatic insufficiency      blood glucose (NO BRAND SPECIFIED) lancets standard Use to test blood sugar 4 times daily or as directed.  Qty: 1 Box, Refills: 3    Associated Diagnoses: Diabetes mellitus without complication (H)      blood glucose monitoring (ACCU-CHEK ROBERTO) test strip Use to test blood sugars 4 times daily or as directed.  Qty: 100 strip, Refills: 3    Associated Diagnoses: Diabetes mellitus without complication (H)      Sharps Container MISC 1 Units 4 times daily as needed  Qty: 1 each, Refills: 1    Associated Diagnoses: Diabetes mellitus without complication (H)       !! - Potential duplicate medications found. Please discuss with provider.      CONTINUE these medications which have NOT CHANGED    Details   aMILoride (MIDAMOR) 5 MG tablet Take 10 mg by mouth 2 times daily      bumetanide (BUMEX) 1 MG tablet Take 1 tablet (1 mg) by mouth daily  Qty: 90 tablet, Refills: 1    Associated Diagnoses: Alcoholic cirrhosis of liver with ascites (H)      omeprazole (PRILOSEC) 20 MG CR capsule Take 1 capsule (20 mg) by mouth daily  Qty: 90 capsule, Refills: 1    Associated Diagnoses: Gastroesophageal reflux disease, esophagitis presence not specified; Alcoholic cirrhosis of  liver with ascites (H)      rifaximin (XIFAXAN) 550 MG TABS tablet Take 1 tablet (550 mg) by mouth 2 times daily  Qty: 60 tablet, Refills: 11    Associated Diagnoses: Hepatic encephalopathy (H)      ESCITALOPRAM OXALATE PO Take 20 mg by mouth daily      lactulose (CHRONULAC) 10 GM/15ML solution Take 20 g by mouth 2 times daily      potassium chloride gillian er (K-DUR) Take 20 mEq by mouth daily      alendronate (FOSAMAX) 70 MG tablet Take 70 mg by mouth every 7 days      Multiple Vitamins-Minerals (MULTIVITAL) TABS Take 1 tablet by mouth  Qty: 30 tablet      amoxicillin (AMOXIL) 500 MG capsule Take 4 capsules (2,000 mg) by mouth See Admin Instructions  Qty: 4 capsule, Refills: 5    Comments: Take 2000 mg 1-2 hours before dental work  Associated Diagnoses: Alcoholic cirrhosis of liver with ascites (H)                  Discharge Instructions and Follow-Up:     Discharge Procedure Orders  Medication Therapy Management Referral   Referral Type: Med Therapy Management     Discharge Instructions   Order Comments: Please stay hydrated, apply moist heat to the involved area, and suck on tart hard candies to promote salivary flow.   Take nonsteroidal antiinflammatory drugs (NSAIDs) for pain.     Reason for your hospital stay   Order Comments: You were admitted for pancreas lesion and new onset diabetes.  Repeated CT scan showed most likely lesion from chronic pancreatitis. Gastroenterologist(GI) plan to follow up with you in clinic. No intervention needed at this time. Frequent loose stool also likely signs of pancreatic enzyme deficiency. We sent a stool confirmatory test to [place], result is in process. In the mean time, we will supplement pancreatic enzyme with meal. You will review the final result with GI at follow up visit. 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 You will need long term insulin and follow up with your primary care provider.    CT  scan from outside hospital showed very small bleeding at right sided of your head. Our neurology strongly recommended you stop smoking. You will need a follow up CT at some point in the future with your primary care provider.    You developed swelling salivary glands after CT scan, most likely contrast related. Other possible causes include bacterial or viral infection. Plan supportive treatment and empirical treated with antibiotic for 7 days. If symptoms not improve, need to be seen by ENT doctor.    Blood culture from [facility] grew skin bacteria, most likely a contamination. Repeat blood culture here has not grown any thing.     Adult [facility] Follow-up and recommended labs and tests   Order Comments: Follow up with   -- primary care provider, AVA BARAJAS, within 7 days for hospital follow- up.  The following labs/tests are recommended: BMP, LFT, INR.    -- GI (the coordinator with call next week)      Appointments on [place] and/or [facility] (with [facility] or [facility] provider or service). Call [phone] if you haven't heard regarding these appointments within 7 days of discharge.     Activity   Order Comments: Your activity upon discharge: activity as tolerated   Order Specific Question Answer Comments   Is discharge order? Yes      DNR/DNI     Diet   Order Comments: Follow this diet upon discharge: Orders Placed This Encounter     Room Service     Low Consistent CHO Diet   Order Specific Question Answer Comments   Is discharge order? Yes                  Discharge Disposition:   Home         Condition on Discharge:   Discharge condition: Stable   Code status on discharge: DNR / DNI        Date of service: 4/23/2017    The patient was discussed with Dr. Rios.    Dex Rodriguez MD  PGY 3 pager #  [phone]    Physician Attestation   I, Natanael Rios, saw and evaluated this patient prior to discharge.  I discussed the patient with the resident and agree with plan of care as documented  in the resident note.      I personally reviewed vital signs, medications, labs and imaging.    I personally spent 25 minutes on discharge activities.    Natanael Rios  Date of Service (when I saw the patient): 4/23/17

## 2017-04-24 ENCOUNTER — TELEPHONE (OUTPATIENT)
Dept: GASTROENTEROLOGY | Facility: CLINIC | Age: 57
End: 2017-04-24

## 2017-04-24 LAB
BACTERIA SPEC CULT: NORMAL
C PEPTIDE SERPL-MCNC: 1.7 NG/ML (ref 0.9–6.9)
GAD65 AB SER IA-ACNC: NORMAL
INSULIN HUMAN AB SER-ACNC: NORMAL
MICRO REPORT STATUS: NORMAL
MUV IGG SER QL IA: 2.7 AI (ref 0–0.8)
SPECIMEN SOURCE: NORMAL

## 2017-04-24 NOTE — TELEPHONE ENCOUNTER
I spoke to Marilyn Aguilar ([phone]) and asked who the advanced endoscopy staff were and she gave me the name and number of Maribel @ [phone]. I left a message for pt and wife stating that Marilyn Aguilar would be calling you to confirm if the EUS will be cancelled and that you should try to reach Maribel for follow up care per Dr. Little.

## 2017-04-24 NOTE — TELEPHONE ENCOUNTER
Received message from Dr. Barnett to cancel EUS on May 2, 2017. Procedure canceled and patient notified. They will make an appointment in the pancreas biliary clinic for follow up.    Marilyn Aguilar RN

## 2017-04-24 NOTE — TELEPHONE ENCOUNTER
----- Message from Rich Wong MD sent at 4/24/2017  9:35 AM CDT -----  Regarding: RE: Pt Spouse calling about Dr. Sidney Osborne saying pt didnt need procedure  Contact: [phone]  The advanced endoscopy people said Mr Fregoso didn't need EUS for his pancreatic lesion after they reviewed his CT while he was in hospital.  i am deferring to them.    ----- Message -----     From: Bessie Penny LPN     Sent: 4/24/2017   9:32 AM       To: Bessie Penny LPN, Rich Wong MD  Subject: FW: Pt Spouse calling about Dr. Sidney Osborne say#    Please advise.   ----- Message -----     From: Luis Mills     Sent: 4/24/2017   8:47 AM       To: Hepatology Nurses-  Subject: Pt Spouse calling about Dr. Sidney Osborne saying #    Good Morning Team,    Pt's Spouse, Marilyn, called in today. Pt was in the hospital over the weekend and Dr. Sidney Osborne ended up going to see him there and doing a different procedure. Pt's Spouse is under the impression that Dr. Little no longer wanted pt to have the Scope procedure on the 2nd, but wanted to get confirmation. Please call them back to confirm that the appt is either canceled or they do need to come for the appt. VM is ok to be left on the phone.    Thank you    Luis :)    Please DO NOT send this message and/or reply back to sender.  Call Center Representatives DO NOT respond to messages.

## 2017-04-25 ENCOUNTER — TELEPHONE (OUTPATIENT)
Dept: GASTROENTEROLOGY | Facility: CLINIC | Age: 57
End: 2017-04-25

## 2017-04-25 ENCOUNTER — ALLIED HEALTH/NURSE VISIT (OUTPATIENT)
Dept: PHARMACY | Facility: CLINIC | Age: 57
End: 2017-04-25
Payer: COMMERCIAL

## 2017-04-25 ENCOUNTER — CARE COORDINATION (OUTPATIENT)
Dept: CARE COORDINATION | Facility: CLINIC | Age: 57
End: 2017-04-25

## 2017-04-25 DIAGNOSIS — K11.20 SALIVARY GLAND INFECTION: ICD-10-CM

## 2017-04-25 DIAGNOSIS — E63.9 NUTRITION DISORDER: ICD-10-CM

## 2017-04-25 DIAGNOSIS — R07.0 THROAT PAIN: ICD-10-CM

## 2017-04-25 DIAGNOSIS — R52 PAIN: ICD-10-CM

## 2017-04-25 DIAGNOSIS — K86.89 PANCREATIC INSUFFICIENCY: ICD-10-CM

## 2017-04-25 DIAGNOSIS — E11.9 DIABETES MELLITUS, TYPE II, INSULIN DEPENDENT (H): Primary | ICD-10-CM

## 2017-04-25 DIAGNOSIS — Z79.4 DIABETES MELLITUS, TYPE II, INSULIN DEPENDENT (H): Primary | ICD-10-CM

## 2017-04-25 DIAGNOSIS — K76.82 HEPATIC ENCEPHALOPATHY (H): ICD-10-CM

## 2017-04-25 DIAGNOSIS — M81.0 OSTEOPOROSIS: ICD-10-CM

## 2017-04-25 DIAGNOSIS — K70.31 ALCOHOLIC CIRRHOSIS OF LIVER WITH ASCITES (H): ICD-10-CM

## 2017-04-25 DIAGNOSIS — K21.9 GASTROESOPHAGEAL REFLUX DISEASE, ESOPHAGITIS PRESENCE NOT SPECIFIED: ICD-10-CM

## 2017-04-25 DIAGNOSIS — E87.6 HYPOKALEMIA: ICD-10-CM

## 2017-04-25 DIAGNOSIS — Z79.2 PROPHYLACTIC ANTIBIOTIC: ICD-10-CM

## 2017-04-25 LAB
ELASTASE PANC STL-MCNT: ABNORMAL UG/G
MUV IGM SER IA-ACNC: 0.75

## 2017-04-25 PROCEDURE — 99607 MTMS BY PHARM ADDL 15 MIN: CPT | Performed by: PHARMACIST

## 2017-04-25 PROCEDURE — 99605 MTMS BY PHARM NP 15 MIN: CPT | Performed by: PHARMACIST

## 2017-04-25 NOTE — PROGRESS NOTES
SUBJECTIVE/OBJECTIVE:                                                    Ravindra Fregoso is a 57 year old male called for a transitions of care visit.  He was discharged from [facility] on 04/23/17 for; # Hyperglycemia, # Diabetes mellitus, # Chronic pancreatitis with Pancreatic mass (wall of necrosis), # Concern for pancreatic insufficiency, # Positve Bld culture at OSH 2/2 contamination, # Bilateral Submandibular Swelling, # R frontal lobe hemorrhage, # EtOH cirrhosis, # Osteoporosis. Ravindra reports his wife organizes his medications and I should talk to his wife, Marilyn.    Chief Complaint: Feels somewhat overwhelmed about his new diabetes diagnosis.  Personal Healthcare Goals: Take care of diabetes, quit smoking.     Allergies/ADRs: Reviewed in Epic  Tobacco: 0-1 pack per day - is interested in quitting Reports is smoking 3 to 4 cigs per day.   Alcohol: not currently using, quit a several months ago  Caffeine: not really using.  Activity: walking.  PMH: Reviewed in Epic    Medication Adherence: no issues reported and has assistance setting up med boxes. Wife Marilyn sets up meds.     Diabetes:  Pt currently taking Humalog 1-10 units before meals according to sliding scale: 140-164 use 1 unit, 165 to 189, use 2 units, 190- 214, use 3 units and so on. Reports uses Lantis 22 units at bedtime. Pt is not experiencing side effects.  SMBG: four times daily.  Ranges (patient reported): testing 3 times daily. Says this morning BS was 191 and gave 3 units of Humalog before breakfast. Reports before lunch, BS was 410, and gave 10 units of Humulog. Reports is seeing PCP tomorrow and will discuss this, and is interested in seeing a dietician.  Symptoms of low blood sugar? none. Frequency of hypoglycemia? never.  Recent symptoms of high blood sugar? polydipsia, polyuria, fatigue, dysuria, feeling grouchy.  Eye exam: due  Foot exam: due  Microalbumin is not on record. Pt is not taking an ACEi/ARB.  Aspirin: Not  taking  Diet/Exercise: Interested in seeing a dietician and will discuss this with PCP, and will start exercise soon.     Salivary gland infection/Throat pain: Reports is taking Cephalexin 250 mg, every 6 hours and will take it for 7 days. Reports is tolerating it well and feels infection is resolving. Reports is using Chloraseptic 6-10 mg aide as needed and Benzocaine 20% aero spray in throat as needed and feels this is resolving.     Pancreatic insufficiency: Reports is taking 24,000 units of Creon 24, three times per day with meals, and feels this is stable.     Pain: Reports is using Ibuprofen 600 mg, about once per week, or less for general pain.    Alcoholic cirrhosis of liver with ascites: Reports takes Amiloride 10 mg, twice daily, and Bumetanide 1 mg, once daily     Hepatic encephalopathy: Reports is taking Rifaximin 550 mg, twice daily, and Lactulose 20 grams, twice daily and reports several loose stools per day, and feels symptoms are improving.     Hypokalemia: On 04/23/17, Potassium level was 3.4 mmol/L. Reports takes Potassium chloride 20 meq, twice daily.    GERD: Current medications include: Prilosec (omeprazole) 20 mg once daily. Pt c/o no current symptoms. The patient does notice symptoms if they miss a dose. Patient feels that current regimen is effective.    Osteoporosis: Current therapy includes: alendronate (Fosamax) 70mg weekly (Pt has been on current therapy for ? years). Pt is not experiencing side effects.  Last vitamin D level: not on file.   DEXA History: last DEXA on file is from 2011. Pt being followed by PCP, Dr. Flores who is an outside provider. Patient reports     Prophylaxis antibiotic: Reports takes Amoxicillin 2,000 mg before dental visit.     Supplements: Reports takes a multivitamin once daily.       Current labs include:  BP Readings from Last 3 Encounters:   04/23/17 134/80   02/19/17 133/80   12/22/16 148/88       Lab Results   Component Value Date    ALT 28 04/23/2017      Lab Results   Component Value Date    UCRR 240 08/29/2012       Last Basic Metabolic Panel:  Lab Results   Component Value Date     04/23/2017      Lab Results   Component Value Date    POTASSIUM 3.4 04/23/2017     Lab Results   Component Value Date    CHLORIDE 107 04/23/2017     Lab Results   Component Value Date    BUN 9 04/23/2017     Lab Results   Component Value Date    CR 0.95 04/23/2017     GFR Estimate   Date Value Ref Range Status   04/23/2017 81 >60 mL/min/1.7m2 Final     Comment:     Non  GFR Calc   04/22/2017 90 >60 mL/min/1.7m2 Final     Comment:     Non  GFR Calc   04/21/2017 >90  Non  GFR Calc   >60 mL/min/1.7m2 Final     GFR Estimate If Black   Date Value Ref Range Status   04/23/2017 >90   GFR Calc   >60 mL/min/1.7m2 Final   04/22/2017 >90   GFR Calc   >60 mL/min/1.7m2 Final   04/21/2017 >90   GFR Calc   >60 mL/min/1.7m2 Final     No results found for: TSH]    Most Recent Immunizations   Administered Date(s) Administered     Hepatitis A Vac Ped/Adol-2 Dose 12/06/2011     Hepatitis B 12/06/2011     Influenza Vaccine IM 3yrs+ 4 Valent IIV4 09/14/2016     Pneumococcal 23 valent 06/06/2011     TD (ADULT, 7+) 04/18/2006     TDAP Vaccine (Adacel) 01/08/2013     Tdap (Adacel,Boostrix) 01/08/2013     ASSESSMENT:                                                       Current medications were reviewed today.      Medication Adherence: no issues identified    Diabetes: Needs Improvement. Newly diagnosed. Pt just started on medications and is getting used to the idea of having diabetes. He is seeing his PCP on soon. We were able to do some diabetic education, and how it is likely insulin doses will be frequently adjusted in the begining. Pt reports he will be seeing a dietician soon. Unable to assess if aspirin is appropriate by using the Asprinmay benefit from aspirin therapy. He is asked to discuss this  "with his PCP.    Salivary gland infection/Throat pain: Stable.    Pancreatic insufficiency: Appears stable.     Pain: Stable.     Alcoholic cirrhosis of liver with ascites: Per pt account this appears stable.    Hepatic encephalopathy: Per pt account this appears stable.     Hypokalemia: Stable. Of note: Combo of Amiloride and Potassium Chloride may increase risk of hyperkalemia. PCP continue to monitor.    GERD: Stable.    Osteoporosis: Unable to fully assess as do not have a recent DEXA. Pt following up with PCP. Instructed pt to take Alendronate, once per week, in the morning, on an empty stomach, 30 minutes away from other medications/food/beverages, as he was taking it with all his AM meds.      Prophylaxis antibiotic: Stable.     Supplements: Stable.       PLAN:                                                      Post Discharge Medication Reconciliation Status: discharge medications reconciled and changed, per note/orders (see AVS).    1) Instructed pt to take Alendronate, once per week, in the morning, on an empty stomach, 30 minutes away from other medications/food/beverages.    2) Pt asked to discuss with PCP if aspirin therapy is appropriate.     3) Smoking cessation, \"You Can Do It\", mailed to patient.     I spent 45 minutes with this patient today.  I offer these suggestions with the understanding that I don't fully understand Ravindra's past medical history and the complexity of his health conditions. Ravindra should make no changes without the approval of his physician. A copy of the visit note was provided to the patient's primary care provider.    Will follow up in two weeks with a phone call.    The patient was mailed a summary of these recommendations as an after visit summary.    Kalina Cornell, [facility] Pharmacist.   [phone]    Current Outpatient Prescriptions   Medication     aMILoride (MIDAMOR) 5 MG tablet     insulin aspart (NOVOLOG PEN) 100 UNIT/ML injection     insulin aspart (NOVOLOG PEN) 100 " UNIT/ML injection     insulin glargine (LANTUS) 100 UNIT/ML injection     Benzocaine (HURRICAINE/TOPEX) 20 % AERO spray     benzocaine-menthol (CHLORASEPTIC) 6-10 MG lozenge     amylase-lipase-protease (CREON 24) 16577 UNITS CPEP per EC capsule     blood glucose (NO BRAND SPECIFIED) lancets standard     blood glucose monitoring (ACCU-CHEK ROBERTO) test strip     Sharps Container MISC     bumetanide (BUMEX) 1 MG tablet     omeprazole (PRILOSEC) 20 MG CR capsule     rifaximin (XIFAXAN) 550 MG TABS tablet     ESCITALOPRAM OXALATE PO     lactulose (CHRONULAC) 10 GM/15ML solution     potassium chloride gillian er (K-DUR)     alendronate (FOSAMAX) 70 MG tablet     Multiple Vitamins-Minerals (MULTIVITAL) TABS     amoxicillin (AMOXIL) 500 MG capsule     No current facility-administered medications for this visit.        Note faxed to PCP, Dr. Flores, at [phone]

## 2017-04-25 NOTE — Clinical Note
Hello, This patient was referred for a MTM visit after their recent hospital discharge as part of [facility]'s transitions of care work. I reviewed their medications. Please see my note for more details and contact me with any questions. This pt is scheduled to see you soon. Kalina Cornell, MTM Pharmacist.  [phone]

## 2017-04-25 NOTE — PROGRESS NOTES
"[facility]  \"Hello, my name is Paradise Hernandez , and I am calling from the [facility].  I want to check in and see how you are doing, after leaving the hospital.  You may also receive a call from your Care Coordinator (care team), but I want to make sure you don t have any urgent needs.  I have a couple questions to review with you:     Post-Discharge Outreach                                                    Ravindra Fregoso is a 57 year old male         Care Team:    Patient Care Team       Relationship Specialty Notifications Start End    Moo Flores PCP - General   5/31/11     Phone: [phone] Fax: [phone]         [facility] [address]  [place] [facility] [address]    Rich Haque MD MD Hepatology  3/2/16     Phone: [phone] Fax: [phone]         [address] [address]    Moo Flores Referring Physician Family Practice  4/25/17     Phone: [phone] Fax: [phone]         [facility] [address]  [place] [facility] [address]    Guru Doron Barnett MD MD Gastroenterology  4/25/17     Phone: [phone] Fax: [phone]         [facility] [address] [place] [address]    Gemma Curtis PA-C Physician Assistant Physician Assistant  4/25/17     Phone: [phone] Fax: [phone]         [address] [address]    Clarissa Phelan, RN Registered Nurse   4/25/17             Transition of Care Review                                                      Did you have a surgery or procedure during your hospital visit? No   If yes, do you have any of the following:     Signs of infection:  NO    Pain:  Yes     Pain Scale (0-10) 6/10     Location: [facility]    Wound/incision concerns? NA    Do you have all of your medications/refills?  Yes    Are you having any side effects or questions about your medication(s)? No    Do you have any " new or worsening symptoms?  Yes- nausea and stomach ache    Do you have any future appointments scheduled?   Yes             Plan                                                      Thanks for your time.  Your Care Coordinator may follow-up within the next couple days.  In the meantime if you have questions, concerns or problems call your care team.        Paradise Hernandez

## 2017-04-26 ENCOUNTER — CARE COORDINATION (OUTPATIENT)
Dept: GASTROENTEROLOGY | Facility: CLINIC | Age: 57
End: 2017-04-26

## 2017-04-26 DIAGNOSIS — K70.31 ALCOHOLIC CIRRHOSIS OF LIVER WITH ASCITES (H): Primary | ICD-10-CM

## 2017-04-26 RX ORDER — AMILORIDE HYDROCHLORIDE 5 MG/1
10 TABLET ORAL 2 TIMES DAILY
Qty: 120 TABLET | Refills: 1 | Status: SHIPPED | OUTPATIENT
Start: 2017-04-26 | End: 2017-06-05

## 2017-04-26 NOTE — PROGRESS NOTES
Message received from patient's wife to confirm appointment scheduled with Dr. Prince.  Left message that appointment is Carly 15, 2017 at 9 am and to arrive 15 minutes prior.    Marilyn (wife) called back and questions if any of the patient's medications could be causing him pain located in the lumbar spine and abdominal pain located in the middle upper abdominal area that radiates slightly to the left of his left breast. Patient relayed that he rates the lower back pain 8/10 and his abdominal pain 6/10. Patient was vague in describing where exactly his abdominal pain is located. Patient admits the pain started last night. Patient states that he feels pukey and blah today. Fever is unknown as patient did not check. Denies chills. Patient is on Creon.  Patient admits that he has an appointment today in [place] for a hospital follow up.     Routing to Clarissa VERMA [facility] to follow up with patient.      Darleen Cao LPN  Advanced Endoscopy~ Panc/Bili  Dr. Acosta, Dr. Barnett & Dr. Karimi  [phone]

## 2017-04-28 LAB
BACTERIA SPEC CULT: NO GROWTH
BACTERIA SPEC CULT: NO GROWTH
MICRO REPORT STATUS: NORMAL
MICRO REPORT STATUS: NORMAL
SPECIMEN SOURCE: NORMAL
SPECIMEN SOURCE: NORMAL

## 2017-05-01 NOTE — PATIENT INSTRUCTIONS
"Recommendations from today's MTM visit:                                                    MTM (medication therapy management) is a service provided by a clinical pharmacist designed to help you get the most of out of your medicines.   Today we reviewed what your medicines are for, how to know if they are working, that your medicines are safe and how to make your medicine regimen as easy as possible.      1) Please take Alendronate (Fosamax), once per week, in the morning, on an empty stomach, 30 minutes away from your other medications/food/beverages.    2) Discuss with your PCP if aspirin therapy is appropriate for you.      3) Smoking cessation, \"You Can Do It\", enclosed.       Next MTM visit: I will call you again to follow up on Tuesday, May 9th, at 12:00 pm. This will appear as a \"telemed\" appointment. Please do not come to this appointment.       To schedule another MTM appointment, please call the clinic directly or you may call the MTM scheduling line at [phone] or toll-free at [phone].     My Clinical Pharmacist's contact information:                                                      It was a pleasure talking to you today!  Please feel free to contact me with any questions or concerns you have.      Kalina Cornell, [facility] Pharmacist.   [phone]      You may receive a survey about the MTM services you received.  I would appreciate your feedback to help me serve you better in the future. Please fill it out and return it when you can. Your comments will be anonymous.      My healthcare goals:                                                      Quit smoking and take care of diabetes.         "

## 2017-05-09 ENCOUNTER — ALLIED HEALTH/NURSE VISIT (OUTPATIENT)
Dept: PHARMACY | Facility: CLINIC | Age: 57
End: 2017-05-09
Payer: COMMERCIAL

## 2017-05-09 DIAGNOSIS — R52 PAIN: ICD-10-CM

## 2017-05-09 DIAGNOSIS — K76.82 HEPATIC ENCEPHALOPATHY (H): ICD-10-CM

## 2017-05-09 DIAGNOSIS — Z79.4 DIABETES MELLITUS, TYPE II, INSULIN DEPENDENT (H): Primary | ICD-10-CM

## 2017-05-09 DIAGNOSIS — K86.89 PANCREATIC INSUFFICIENCY: ICD-10-CM

## 2017-05-09 DIAGNOSIS — E11.9 DIABETES MELLITUS, TYPE II, INSULIN DEPENDENT (H): Primary | ICD-10-CM

## 2017-05-09 DIAGNOSIS — K21.9 GASTROESOPHAGEAL REFLUX DISEASE, ESOPHAGITIS PRESENCE NOT SPECIFIED: ICD-10-CM

## 2017-05-09 DIAGNOSIS — K70.31 ALCOHOLIC CIRRHOSIS OF LIVER WITH ASCITES (H): ICD-10-CM

## 2017-05-09 DIAGNOSIS — M81.0 OSTEOPOROSIS: ICD-10-CM

## 2017-05-09 DIAGNOSIS — E87.6 HYPOKALEMIA: ICD-10-CM

## 2017-05-09 PROCEDURE — 99606 MTMS BY PHARM EST 15 MIN: CPT | Performed by: PHARMACIST

## 2017-05-09 NOTE — Clinical Note
MTM Pharmacist follow up note. Pt has upcoming apt with you in June. Kalina Cornell, [facility] Pharmacist.  [phone]

## 2017-05-09 NOTE — PROGRESS NOTES
SUBJECTIVE/OBJECTIVE:                                                    Ravindra Fregoso is a 57 year old male called for a follow-up visit for Medication Therapy Management.  He was referred to me from [facility]. He was discharged from [facility] on 04/23/17 for; # Hyperglycemia, # Diabetes mellitus, # Chronic pancreatitis with Pancreatic mass (wall of necrosis), # Concern for pancreatic insufficiency, # Positve Bld culture at OSH 2/2 contamination, # Bilateral Submandibular Swelling, # R frontal lobe hemorrhage, # EtOH cirrhosis, # Osteoporosis. Ravindra reports his wife organizes his medications and I should talk to his wife, Marilyn. He was initially called on 04/27/17 and today is a follow up.     Chief Complaint: Follow up from our visit on 04/24/17. No complaints. Reports he is better able to manage diabetes, and reports he went to see a dietician today and will be going to classes to learn more about eating a diabetic diet. Reports they forgot to ask MD today if Aspirin therapy would be appropriate, but will next Tuesday, when they see the MD again.  Personal Healthcare Goals: Reports he continues with no alcohol.  Tobacco: 0-1 pack per day - is interested in quitting Tobacco Cessation Action Plan: Information mailed to pt on 04/27/17.  Reports he is still smoking about 3 to 4 cigarettes per day, and continues to work on quitting.   Alcohol: not currently using    Medication Adherence: no issues reported    Diabetes:  Pt currently Humalog 1-10 units (was just upped to 3) before meals according to sliding scale Reports Lantus dose was lowered today to 20 units at bedtime instead of 22 Units, and will use this dose for the next 3 to 4 days. Pt is not experiencing side effects.  SMBG: 3  times daily. Ranges (patient reported): testing 3 times daily. Says morning bs were a little low at 65, 70 and 80. Reports only one time having symptoms of low blood sugars, so MD lowered Lantus dose. Reports all other blood sugars have  been in the range of 170 to 180, and feels he and his wife are able to manage his diabetes much better and they feel things are improving. He is hoping once he starts his diabetic diet, he will use less insulin and hopes to be off all insulin.   Eye exam: due  Foot exam: due  Microalbumin is not on record. Pt is not taking an ACEi/ARB.  Aspirin: Not taking  Diet/Exercise: Interested in seeing a dietician and will discuss this with PCP, and will start exercise soon.      Salivary gland infection/Throat pain: Reports this has resolved and is finished with medications.     Pancreatic insufficiency: Reports he continues to take, 24,000 units of Creon 24, three times per day with meals, and feels this is stable.      Pain: Reports only took one  Ibuprofen 600 mg, about one week ago for a headache. Feels this is stable.     Alcoholic cirrhosis of liver with ascites: Reports he continues to take Amiloride 10 mg, twice daily, and Bumetanide 1 mg, once daily. Reports does not monitor blood pressure and does not have any edema symptoms.       Hepatic encephalopathy: Reports he continues to take, Rifaximin 550 mg, twice daily, and Lactulose 20 grams, twice daily and reports several loose stools per day, and feels symptoms continue to improve.      Hypokalemia: On 04/23/17, Potassium level was 3.4 mmol/L. Reports he continues to take Potassium chloride 20 meq, twice daily. Reports PCP did labs today and is monitoring K+.     GERD: Reports he continues to take: Prilosec (omeprazole) 20 mg once daily. Pt c/o no current symptoms. The patient does notice symptoms if they miss a dose. Patient feels that current regimen is effective.     Osteoporosis: Reports he continues to take: alendronate (Fosamax) 70mg weekly (Pt has been on current therapy for ? years). Pt is not experiencing side effects.  Last vitamin D level: not on file.   DEXA History: last DEXA on file is from 2011. Pt being followed by PCP, Dr. Flores who is an outside  provider. Patient reports tolerates medication well, and is taking it in the morning, on an empty stomach, 30 min away from other medications/food and beverages.       Current labs include:  BP Readings from Last 3 Encounters:   04/23/17 134/80   02/19/17 133/80   12/22/16 148/88       Liver Function Studies -   Recent Labs   Lab Test  04/23/17   0651   PROTTOTAL  5.6*   ALBUMIN  2.2*   BILITOTAL  1.6*   ALKPHOS  95   AST  30   ALT  28       Lab Results   Component Value Date    UCRR 240 08/29/2012       Last Basic Metabolic Panel:  Lab Results   Component Value Date     04/23/2017      Lab Results   Component Value Date    POTASSIUM 3.4 04/23/2017     Lab Results   Component Value Date    CHLORIDE 107 04/23/2017     Lab Results   Component Value Date    BUN 9 04/23/2017     Lab Results   Component Value Date    CR 0.95 04/23/2017     GFR Estimate   Date Value Ref Range Status   04/23/2017 81 >60 mL/min/1.7m2 Final     Comment:     Non  GFR Calc   04/22/2017 90 >60 mL/min/1.7m2 Final     Comment:     Non  GFR Calc   04/21/2017 >90  Non  GFR Calc   >60 mL/min/1.7m2 Final     GFR Estimate If Black   Date Value Ref Range Status   04/23/2017 >90   GFR Calc   >60 mL/min/1.7m2 Final   04/22/2017 >90   GFR Calc   >60 mL/min/1.7m2 Final   04/21/2017 >90   GFR Calc   >60 mL/min/1.7m2 Final     No results found for: TSH]    Most Recent Immunizations   Administered Date(s) Administered     Hepatitis A Vac Ped/Adol-2 Dose 12/06/2011     Hepatitis B 12/06/2011     Influenza Vaccine IM 3yrs+ 4 Valent IIV4 09/14/2016     Pneumococcal 23 valent 06/06/2011     TD (ADULT, 7+) 04/18/2006     TDAP Vaccine (Adacel) 01/08/2013     Tdap (Adacel,Boostrix) 01/08/2013     ASSESSMENT:                                                    Current medications were reviewed today as discussed above.      Medication Adherence: no issues  identified    Diabetes: Appears to be improved, per patient account. PCP is adjusting insulin doses appropriately and pt is going to nutrition classes. Pt is asked to talk to PCP about starting aspirin therapy.     Salivary gland infection/Throat pain: Resolved.     Pancreatic insufficiency: Appears stable.      Pain: Stable.      Alcoholic cirrhosis of liver with ascites: Per pt account this appears stable.     Hepatic encephalopathy: Per pt account this appears stable.      Hypokalemia: Stable. Of note: Combo of Amiloride and Potassium Chloride may increase risk of hyperkalemia. PCP continue to monitor.     GERD: Stable.     Osteoporosis: Unable to fully assess as do not have a recent DEXA. Pt following up with PCP. Per pt account, he is following dosing instructions correctly.        PLAN:                                                      No considerations for medication changes today.    Consideration from last telephone visit:    Pt asked to discuss with PCP if aspirin therapy is appropriate    I spent 15 minutes with this patient today.  I offer these suggestions with the understanding that I don't fully understand Ravindra's past medical history and the complexity of his health conditions. Ravindra should make no changes without the approval of his physician. A copy of the visit note was provided to the patient's primary care provider.     Pt is asked to continue to follow up with MD's as scheduled/needed.    The patient was mailed a summary of these recommendations as an after visit summary.    Kalina Cornell, [facility] Pharmacist.   [phone]    Note faxed to PCP, Dr. Flores, at [phone]    Current Outpatient Prescriptions   Medication     aMILoride (MIDAMOR) 5 MG tablet     insulin aspart (NOVOLOG PEN) 100 UNIT/ML injection     insulin aspart (NOVOLOG PEN) 100 UNIT/ML injection     insulin glargine (LANTUS) 100 UNIT/ML injection     benzocaine-menthol (CHLORASEPTIC) 6-10 MG lozenge      amylase-lipase-protease (CREON 24) 18519 UNITS CPEP per EC capsule     blood glucose (NO BRAND SPECIFIED) lancets standard     blood glucose monitoring (ACCU-CHEK ROBERTO) test strip     Sharps Container MISC     bumetanide (BUMEX) 1 MG tablet     omeprazole (PRILOSEC) 20 MG CR capsule     rifaximin (XIFAXAN) 550 MG TABS tablet     ESCITALOPRAM OXALATE PO     lactulose (CHRONULAC) 10 GM/15ML solution     potassium chloride gillian er (K-DUR)     alendronate (FOSAMAX) 70 MG tablet     Multiple Vitamins-Minerals (MULTIVITAL) TABS     amoxicillin (AMOXIL) 500 MG capsule     No current facility-administered medications for this visit.

## 2017-05-09 NOTE — PATIENT INSTRUCTIONS
Recommendations from today's MTM visit:                                                      No considerations for medication changes today.    Consideration from last telephone visit:    Pt asked to discuss with PCP if aspirin therapy is appropriate      To schedule another MTM appointment, please call the clinic directly or you may call the MTM scheduling line at [phone] or toll-free at [phone].     My Clinical Pharmacist's contact information:                                                      It was a pleasure talking to you and your wife, Marilyn again today!  Please feel free to contact me with any questions or concerns you have.      Kalina Cornell, [facility] Pharmacist.   [phone]      You may receive a survey about the [facility] services you received.  I would appreciate your feedback to help me serve you better in the future. Please fill it out and return it when you can. Your comments will be anonymous.      My healthcare goals:                                                      Continue alcohol free. Continue working on quitting smoking. Working on diabetic diet.

## 2017-05-15 DIAGNOSIS — K70.31 ALCOHOLIC CIRRHOSIS OF LIVER WITH ASCITES (H): Primary | ICD-10-CM

## 2017-05-15 LAB — LAB SCANNED RESULT: NORMAL

## 2017-06-05 ENCOUNTER — MYC MEDICAL ADVICE (OUTPATIENT)
Dept: GASTROENTEROLOGY | Facility: CLINIC | Age: 57
End: 2017-06-05

## 2017-06-05 ENCOUNTER — CARE COORDINATION (OUTPATIENT)
Dept: GASTROENTEROLOGY | Facility: CLINIC | Age: 57
End: 2017-06-05

## 2017-06-05 DIAGNOSIS — K70.31 ALCOHOLIC CIRRHOSIS OF LIVER WITH ASCITES (H): ICD-10-CM

## 2017-06-05 RX ORDER — AMILORIDE HYDROCHLORIDE 5 MG/1
10 TABLET ORAL 2 TIMES DAILY
Qty: 120 TABLET | Refills: 1 | Status: SHIPPED | OUTPATIENT
Start: 2017-06-05 | End: 2017-06-12

## 2017-06-05 NOTE — PROGRESS NOTES
Message left with appointment information scheduled for 6/15/2017 at 9 am with Dr. Barnett.    Darleen Cao GONSALO  Advanced Endoscopy~ Panc/Bili  Dr. Acosta, Dr. Barnett & Dr. Karimi  [phone]

## 2017-06-05 NOTE — TELEPHONE ENCOUNTER
Amiloride refilled per clinic protocol and routed to [facility]'s pharmacy in [place].

## 2017-06-12 ENCOUNTER — OFFICE VISIT (OUTPATIENT)
Dept: GASTROENTEROLOGY | Facility: CLINIC | Age: 57
End: 2017-06-12
Attending: INTERNAL MEDICINE
Payer: MEDICARE

## 2017-06-12 VITALS
HEIGHT: 64 IN | BODY MASS INDEX: 26.05 KG/M2 | DIASTOLIC BLOOD PRESSURE: 84 MMHG | OXYGEN SATURATION: 96 % | TEMPERATURE: 97.9 F | SYSTOLIC BLOOD PRESSURE: 171 MMHG | WEIGHT: 152.6 LBS | RESPIRATION RATE: 16 BRPM | HEART RATE: 69 BPM

## 2017-06-12 DIAGNOSIS — K70.31 ALCOHOLIC CIRRHOSIS OF LIVER WITH ASCITES (H): ICD-10-CM

## 2017-06-12 DIAGNOSIS — K76.82 HEPATIC ENCEPHALOPATHY (H): Primary | ICD-10-CM

## 2017-06-12 LAB
ALBUMIN SERPL-MCNC: 3 G/DL (ref 3.4–5)
ALP SERPL-CCNC: 119 U/L (ref 40–150)
ALT SERPL W P-5'-P-CCNC: 40 U/L (ref 0–70)
ANION GAP SERPL CALCULATED.3IONS-SCNC: 9 MMOL/L (ref 3–14)
AST SERPL W P-5'-P-CCNC: 66 U/L (ref 0–45)
BILIRUB DIRECT SERPL-MCNC: 0.6 MG/DL (ref 0–0.2)
BILIRUB SERPL-MCNC: 2 MG/DL (ref 0.2–1.3)
BUN SERPL-MCNC: 15 MG/DL (ref 7–30)
CALCIUM SERPL-MCNC: 8 MG/DL (ref 8.5–10.1)
CHLORIDE SERPL-SCNC: 108 MMOL/L (ref 94–109)
CO2 SERPL-SCNC: 26 MMOL/L (ref 20–32)
CREAT SERPL-MCNC: 0.79 MG/DL (ref 0.66–1.25)
ERYTHROCYTE [DISTWIDTH] IN BLOOD BY AUTOMATED COUNT: 15.2 % (ref 10–15)
GFR SERPL CREATININE-BSD FRML MDRD: ABNORMAL ML/MIN/1.7M2
GLUCOSE SERPL-MCNC: 101 MG/DL (ref 70–99)
HCT VFR BLD AUTO: 38.6 % (ref 40–53)
HGB BLD-MCNC: 13 G/DL (ref 13.3–17.7)
INR PPP: 1.42 (ref 0.86–1.14)
MCH RBC QN AUTO: 34.4 PG (ref 26.5–33)
MCHC RBC AUTO-ENTMCNC: 33.7 G/DL (ref 31.5–36.5)
MCV RBC AUTO: 102 FL (ref 78–100)
PLATELET # BLD AUTO: 119 10E9/L (ref 150–450)
POTASSIUM SERPL-SCNC: 4.2 MMOL/L (ref 3.4–5.3)
PROT SERPL-MCNC: 6.8 G/DL (ref 6.8–8.8)
RBC # BLD AUTO: 3.78 10E12/L (ref 4.4–5.9)
SODIUM SERPL-SCNC: 143 MMOL/L (ref 133–144)
WBC # BLD AUTO: 6.5 10E9/L (ref 4–11)

## 2017-06-12 PROCEDURE — 85027 COMPLETE CBC AUTOMATED: CPT | Performed by: INTERNAL MEDICINE

## 2017-06-12 PROCEDURE — 36415 COLL VENOUS BLD VENIPUNCTURE: CPT | Performed by: INTERNAL MEDICINE

## 2017-06-12 PROCEDURE — 80048 BASIC METABOLIC PNL TOTAL CA: CPT | Performed by: INTERNAL MEDICINE

## 2017-06-12 PROCEDURE — 85610 PROTHROMBIN TIME: CPT | Performed by: INTERNAL MEDICINE

## 2017-06-12 PROCEDURE — 99213 OFFICE O/P EST LOW 20 MIN: CPT | Mod: ZF

## 2017-06-12 PROCEDURE — 80076 HEPATIC FUNCTION PANEL: CPT | Performed by: INTERNAL MEDICINE

## 2017-06-12 RX ORDER — FLUOXETINE 20 MG/1
20 TABLET, FILM COATED ORAL DAILY
COMMUNITY
End: 2020-03-04 | Stop reason: DRUGHIGH

## 2017-06-12 RX ORDER — AMILORIDE HYDROCHLORIDE 5 MG/1
10 TABLET ORAL DAILY
Qty: 120 TABLET | Refills: 1
Start: 2017-06-12

## 2017-06-12 ASSESSMENT — PAIN SCALES - GENERAL: PAINLEVEL: NO PAIN (0)

## 2017-06-12 NOTE — LETTER
6/12/2017      RE: Ravindra Fregoso  [address] [facility] [id number]       [facility]    Hepatology follow-up    Follow-up visit for cirrhosis    Subjective:  57 year old male    Cirrhosis  - ETOH  - last ETOH ~2 months ago  - hx ascites, on Bumex and triamterene (gynecomastia on spironolactone)  - hx HE, on rifaximin   - no hx variceal bleed  - last EGD Dec 2016- gd I varices, portal HTN gastropathy  - HCC screening- CT A/P April 2017    Patient presents to clinic this morning with his wife for follow-up of alcoholic cirrhosis.  Last clinic visit was 12/2016.  Since then, the patient underwent EGD which showed some small esophageal varices, mild Schatzki's ring and portal hypertensive gastropathy.       He also underwent MRI liver/MRCP following an incidental finding on CT done closer to home.  He was due to undergo EUS in 04/2017 but was admitted to the hospital for a significant hyperglycemia detected at his pre-operative evaluation.  CT scan was  performed to rule out pancreatic cancer due to changes noted consistent with chronic pancreatitis.  This was negative for mass, and the patient was started on insulin.  He is due to see GI Panc-Bili later this week.  The patient was restarted on rifaximin while in the hospital due to some mild confusion.  Of note, the patient also had a CT head notable for a small hemorrhage in the right frontal area.  Follow-up imaging has been recently performed and shows resolution of the bleed.      The patient is well.  He denies any signs or symptoms specific to liver disease.      He denies confusion since resuming rifaximin.  He takes lactulose intermittently.      No jaundice, abdominal distention or lower extremity edema.      The patient is adherent to his diuretics.  The patient is adherent to his pancreatic enzymes.      The patient denies any history of fevers, sweats or chills.  His weight has increased slightly by 8 pounds since his last  clinic visit.       The patient continues to take insulin.  He has weaned off his supplemental insulin with meals.  He checks his blood sugars 3 times per day with a range of 67 to 140.  He recently had a spurious value of 240.  He is asymptomatic when his blood sugars are in the 60s.      The patient continues to drink alcohol periodically.  He last drank alcohol on Friday after work.  He continues to work occasionally.  His mother was recently diagnosed with stage IV cancer.       Medical hx Surgical hx   Past Medical History:   Diagnosis Date     Cirrhosis of liver (H)      Depression      Osteoporosis      Polyps, colonic       Past Surgical History:   Procedure Laterality Date     ESOPHAGOSCOPY, GASTROSCOPY, DUODENOSCOPY (EGD), COMBINED  11/2/2012    Procedure: COMBINED ESOPHAGOSCOPY, GASTROSCOPY, DUODENOSCOPY (EGD);;  Surgeon: Darell Stewart MD;  Location:  GI     ESOPHAGOSCOPY, GASTROSCOPY, DUODENOSCOPY (EGD), COMBINED  12/6/2013    Procedure: COMBINED ESOPHAGOSCOPY, GASTROSCOPY, DUODENOSCOPY (EGD);;  Surgeon: Darell Stewart MD;  Location:  GI     ESOPHAGOSCOPY, GASTROSCOPY, DUODENOSCOPY (EGD), COMBINED N/A 12/17/2014    Procedure: COMBINED ESOPHAGOSCOPY, GASTROSCOPY, DUODENOSCOPY (EGD), BIOPSY SINGLE OR MULTIPLE;  Surgeon: Philippe Zhu MD;  Location:  GI     ESOPHAGOSCOPY, GASTROSCOPY, DUODENOSCOPY (EGD), COMBINED N/A 12/22/2016    Procedure: COMBINED ESOPHAGOSCOPY, GASTROSCOPY, DUODENOSCOPY (EGD);  Surgeon: Rich Haque MD;  Location: [facility]     HC UGI ENDOSCOPY W EUS Left 5/4/2015    Procedure: COMBINED ENDOSCOPIC ULTRASOUND, ESOPHAGOSCOPY, GASTROSCOPY, DUODENOSCOPY (EGD);  Surgeon: Tremaine Sanches MD;  Location:  GI     HERNIORRHAPHY INGUINAL      bilateral repair     ORTHOPEDIC SURGERY      fx left leg, left hip      ORTHOPEDIC SURGERY      fx right ankle and right arm          Medications  Prior to Admission medications    Medication Sig Start  Date End Date Taking? Authorizing Provider   insulin lispro (HUMALOG KWIKPEN) 100 UNIT/ML injection Inject Subcutaneous 3 times daily (before meals) sliding scale   Yes Reported, Patient   FLUoxetine 20 MG tablet Take 20 mg by mouth daily   Yes Reported, Patient   aMILoride (MIDAMOR) 5 MG tablet Take 2 tablets (10 mg) by mouth daily 6/12/17  Yes Rich Haque MD   insulin glargine (LANTUS) 100 UNIT/ML injection Inject 22 Units Subcutaneous At Bedtime  Patient taking differently: Inject 20 Units Subcutaneous At Bedtime  4/23/17  Yes Natanael Rios MD   amylase-lipase-protease (CREON 24) 05341 UNITS CPEP per EC capsule Take 1 capsule (24,000 Units) by mouth 3 times daily (with meals) 4/23/17  Yes Natanael Rios MD   blood glucose (NO BRAND SPECIFIED) lancets standard Use to test blood sugar 4 times daily or as directed. 4/23/17  Yes Natanael Rios MD   blood glucose monitoring (ACCU-CHEK ROBERTO) test strip Use to test blood sugars 4 times daily or as directed. 4/23/17  Yes Natanael Rios MD   Sharps Container MISC 1 Units 4 times daily as needed 4/23/17  Yes Natanael Rios MD   bumetanide (BUMEX) 1 MG tablet Take 1 tablet (1 mg) by mouth daily 3/21/17  Yes Rich Haque MD   omeprazole (PRILOSEC) 20 MG CR capsule Take 1 capsule (20 mg) by mouth daily 3/8/17  Yes Rich Haque MD   rifaximin (XIFAXAN) 550 MG TABS tablet Take 1 tablet (550 mg) by mouth 2 times daily 2/23/17  Yes Mini Carey MD   lactulose (CHRONULAC) 10 GM/15ML solution Take 20 g by mouth 2 times daily   Yes Unknown, Entered By History   potassium chloride gillian er (K-DUR) Take 20 mEq by mouth daily   Yes Unknown, Entered By History   alendronate (FOSAMAX) 70 MG tablet Take 70 mg by mouth every 7 days   Yes Reported, Patient   Multiple Vitamins-Minerals (MULTIVITAL) TABS Take 1 tablet by mouth 11/19/15  Yes Rich Haque MD   amoxicillin (AMOXIL) 500 MG capsule Take 4 capsules (2,000  "mg) by mouth See Admin Instructions  Patient not taking: Reported on 6/12/2017 10/12/15   Lili Delaney, NP       Allergies  Allergies   Allergen Reactions     Atorvastatin Calcium      No Clinical Screening - See Comments      Platelets ( infusion)       Review of systems  A 10-point review of systems was negative    Examination  /84 (BP Location: Right arm, Patient Position: Chair, Cuff Size: Adult Regular)  Pulse 69  Temp 97.9  F (36.6  C) (Oral)  Resp 16  Ht 1.626 m (5' 4.02\")  Wt 69.2 kg (152 lb 9.6 oz)  SpO2 96%  BMI 26.18 kg/m2  Body mass index is 26.18 kg/(m^2).    Gen- well, NAD, A+Ox3, normal color  CVS- RRR, soft systolic murmur  RS- CTA  Abd- mild distension, small reducible umbilical hernia, soft, non-tender, no obvious ascites or organomegaly on palpation or percussion, BS+  Extr-hands normal, no JOHN  Skin- mild gynecomastia, no rash or jaundice  Neuro- no asterixis  Psych- normal mood    Laboratory  Lab Results   Component Value Date     06/12/2017    POTASSIUM 4.2 06/12/2017    CHLORIDE 108 06/12/2017    CO2 26 06/12/2017    BUN 15 06/12/2017    CR 0.79 06/12/2017       Lab Results   Component Value Date    BILITOTAL 2.0 06/12/2017    ALT Pending 06/12/2017    AST 66 06/12/2017    ALKPHOS 119 06/12/2017       Lab Results   Component Value Date    ALBUMIN 3.0 06/12/2017    PROTTOTAL 6.8 06/12/2017        Lab Results   Component Value Date    WBC 6.5 06/12/2017    HGB 13.0 06/12/2017     06/12/2017     06/12/2017       Lab Results   Component Value Date    INR 1.42 06/12/2017       Radiology  CT A/P April 2017 reviewed  MRI liver/MRCP reviewed    Assessment  57-year-old male with history of decompensated alcoholic cirrhosis complicated with hepatic encephalopathy and ascites who presents for routine follow-up.  MELD-Na= 13.  Last ETOH= 06/09/2017.  Ascites and encephalopathy are well-controlled on current medication regimen.  He is not a candidate for liver " transplant due to his ongoing alcohol use.      Diabetes appears well-controlled on current insulin regimen.  The patient will follow up with GI Panc-Bili later this week for ongoing surveillance of a pancreatic lesion.  Up-to-date with surveillance of esophageal varices.  The patient is due for HCC screening in October and would prefer to get this at the time of his follow-up visit 2 months later in December.      We again discussed the importance of alcohol and smoking cessation to prevent further deterioration in general health and liver health.  We also discussed the risks of malignancy, stroke and myocardial infarction associated with ongoing smoking.     Plan  1.  ETOH cessation  2.  Smoking cessation  3.  Low Na diet  4.  Continue furosemide and amiloride at current doses  5.  Continue lactulose and rifaximin  6.  Follow-up in 6 months    Rich Little MD  Hepatology  [facility]

## 2017-06-12 NOTE — NURSING NOTE
"Chief Complaint   Patient presents with     RECHECK     Cirrhosis       Initial /84 (BP Location: Right arm, Patient Position: Chair, Cuff Size: Adult Regular)  Pulse 69  Temp 97.9  F (36.6  C) (Oral)  Resp 16  Ht 1.626 m (5' 4.02\")  Wt 69.2 kg (152 lb 9.6 oz)  SpO2 96%  BMI 26.18 kg/m2 Estimated body mass index is 26.18 kg/(m^2) as calculated from the following:    Height as of this encounter: 1.626 m (5' 4.02\").    Weight as of this encounter: 69.2 kg (152 lb 9.6 oz).  Medication Reconciliation: complete     Gemma Levin [facility]  6/12/2017 7:53 AM        "

## 2017-06-12 NOTE — PROGRESS NOTES
[facility]    Hepatology follow-up    Follow-up visit for cirrhosis    Subjective:  57 year old male    Cirrhosis  - ETOH  - last ETOH ~2 months ago  - hx ascites, on Bumex and triamterene (gynecomastia on spironolactone)  - hx HE, on rifaximin   - no hx variceal bleed  - last EGD Dec 2016- gd I varices, portal HTN gastropathy  - HCC screening- CT A/P April 2017    Patient presents to clinic this morning with his wife for follow-up of alcoholic cirrhosis.  Last clinic visit was 12/2016.  Since then, the patient underwent EGD which showed some small esophageal varices, mild Schatzki's ring and portal hypertensive gastropathy.       He also underwent MRI liver/MRCP following an incidental finding on CT done closer to home.  He was due to undergo EUS in 04/2017 but was admitted to the hospital for a significant hyperglycemia detected at his pre-operative evaluation.  CT scan was  performed to rule out pancreatic cancer due to changes noted consistent with chronic pancreatitis.  This was negative for mass, and the patient was started on insulin.  He is due to see GI Panc-Bili later this week.  The patient was restarted on rifaximin while in the hospital due to some mild confusion.  Of note, the patient also had a CT head notable for a small hemorrhage in the right frontal area.  Follow-up imaging has been recently performed and shows resolution of the bleed.      The patient is well.  He denies any signs or symptoms specific to liver disease.      He denies confusion since resuming rifaximin.  He takes lactulose intermittently.      No jaundice, abdominal distention or lower extremity edema.      The patient is adherent to his diuretics.  The patient is adherent to his pancreatic enzymes.      The patient denies any history of fevers, sweats or chills.  His weight has increased slightly by 8 pounds since his last clinic visit.       The patient continues to take insulin.  He has weaned off  his supplemental insulin with meals.  He checks his blood sugars 3 times per day with a range of 67 to 140.  He recently had a spurious value of 240.  He is asymptomatic when his blood sugars are in the 60s.      The patient continues to drink alcohol periodically.  He last drank alcohol on Friday after work.  He continues to work occasionally.  His mother was recently diagnosed with stage IV cancer.       Medical hx Surgical hx   Past Medical History:   Diagnosis Date     Cirrhosis of liver (H)      Depression      Osteoporosis      Polyps, colonic       Past Surgical History:   Procedure Laterality Date     ESOPHAGOSCOPY, GASTROSCOPY, DUODENOSCOPY (EGD), COMBINED  11/2/2012    Procedure: COMBINED ESOPHAGOSCOPY, GASTROSCOPY, DUODENOSCOPY (EGD);;  Surgeon: Darell Stewart MD;  Location:  GI     ESOPHAGOSCOPY, GASTROSCOPY, DUODENOSCOPY (EGD), COMBINED  12/6/2013    Procedure: COMBINED ESOPHAGOSCOPY, GASTROSCOPY, DUODENOSCOPY (EGD);;  Surgeon: Darell Stewart MD;  Location:  GI     ESOPHAGOSCOPY, GASTROSCOPY, DUODENOSCOPY (EGD), COMBINED N/A 12/17/2014    Procedure: COMBINED ESOPHAGOSCOPY, GASTROSCOPY, DUODENOSCOPY (EGD), BIOPSY SINGLE OR MULTIPLE;  Surgeon: Philippe Zhu MD;  Location:  GI     ESOPHAGOSCOPY, GASTROSCOPY, DUODENOSCOPY (EGD), COMBINED N/A 12/22/2016    Procedure: COMBINED ESOPHAGOSCOPY, GASTROSCOPY, DUODENOSCOPY (EGD);  Surgeon: Rich Haque MD;  Location: [facility]     HC UGI ENDOSCOPY W EUS Left 5/4/2015    Procedure: COMBINED ENDOSCOPIC ULTRASOUND, ESOPHAGOSCOPY, GASTROSCOPY, DUODENOSCOPY (EGD);  Surgeon: Tremaine Sanches MD;  Location:  GI     HERNIORRHAPHY INGUINAL      bilateral repair     ORTHOPEDIC SURGERY      fx left leg, left hip      ORTHOPEDIC SURGERY      fx right ankle and right arm          Medications  Prior to Admission medications    Medication Sig Start Date End Date Taking? Authorizing Provider   insulin lispro (HUMALOG KWIKPEN)  100 UNIT/ML injection Inject Subcutaneous 3 times daily (before meals) sliding scale   Yes Reported, Patient   FLUoxetine 20 MG tablet Take 20 mg by mouth daily   Yes Reported, Patient   aMILoride (MIDAMOR) 5 MG tablet Take 2 tablets (10 mg) by mouth daily 6/12/17  Yes Rich Haque MD   insulin glargine (LANTUS) 100 UNIT/ML injection Inject 22 Units Subcutaneous At Bedtime  Patient taking differently: Inject 20 Units Subcutaneous At Bedtime  4/23/17  Yes Natanael Rios MD   amylase-lipase-protease (CREON 24) 02422 UNITS CPEP per EC capsule Take 1 capsule (24,000 Units) by mouth 3 times daily (with meals) 4/23/17  Yes Natanael Rios MD   blood glucose (NO BRAND SPECIFIED) lancets standard Use to test blood sugar 4 times daily or as directed. 4/23/17  Yes Natanael Rios MD   blood glucose monitoring (ACCU-CHEK ROBERTO) test strip Use to test blood sugars 4 times daily or as directed. 4/23/17  Yes Natanael Rios MD   Sharps Container MISC 1 Units 4 times daily as needed 4/23/17  Yes Natanael Rios MD   bumetanide (BUMEX) 1 MG tablet Take 1 tablet (1 mg) by mouth daily 3/21/17  Yes Rich Haque MD   omeprazole (PRILOSEC) 20 MG CR capsule Take 1 capsule (20 mg) by mouth daily 3/8/17  Yes Rich Haque MD   rifaximin (XIFAXAN) 550 MG TABS tablet Take 1 tablet (550 mg) by mouth 2 times daily 2/23/17  Yes Mini Carey MD   lactulose (CHRONULAC) 10 GM/15ML solution Take 20 g by mouth 2 times daily   Yes Unknown, Entered By History   potassium chloride gillian er (K-DUR) Take 20 mEq by mouth daily   Yes Unknown, Entered By History   alendronate (FOSAMAX) 70 MG tablet Take 70 mg by mouth every 7 days   Yes Reported, Patient   Multiple Vitamins-Minerals (MULTIVITAL) TABS Take 1 tablet by mouth 11/19/15  Yes Rich Haque MD   amoxicillin (AMOXIL) 500 MG capsule Take 4 capsules (2,000 mg) by mouth See Admin Instructions  Patient not taking: Reported on  "6/12/2017 10/12/15   Lili Delaney, NP       Allergies  Allergies   Allergen Reactions     Atorvastatin Calcium      No Clinical Screening - See Comments      Platelets ( infusion)       Review of systems  A 10-point review of systems was negative    Examination  /84 (BP Location: Right arm, Patient Position: Chair, Cuff Size: Adult Regular)  Pulse 69  Temp 97.9  F (36.6  C) (Oral)  Resp 16  Ht 1.626 m (5' 4.02\")  Wt 69.2 kg (152 lb 9.6 oz)  SpO2 96%  BMI 26.18 kg/m2  Body mass index is 26.18 kg/(m^2).    Gen- well, NAD, A+Ox3, normal color  CVS- RRR, soft systolic murmur  RS- CTA  Abd- mild distension, small reducible umbilical hernia, soft, non-tender, no obvious ascites or organomegaly on palpation or percussion, BS+  Extr-hands normal, no JOHN  Skin- mild gynecomastia, no rash or jaundice  Neuro- no asterixis  Psych- normal mood    Laboratory  Lab Results   Component Value Date     06/12/2017    POTASSIUM 4.2 06/12/2017    CHLORIDE 108 06/12/2017    CO2 26 06/12/2017    BUN 15 06/12/2017    CR 0.79 06/12/2017       Lab Results   Component Value Date    BILITOTAL 2.0 06/12/2017    ALT Pending 06/12/2017    AST 66 06/12/2017    ALKPHOS 119 06/12/2017       Lab Results   Component Value Date    ALBUMIN 3.0 06/12/2017    PROTTOTAL 6.8 06/12/2017        Lab Results   Component Value Date    WBC 6.5 06/12/2017    HGB 13.0 06/12/2017     06/12/2017     06/12/2017       Lab Results   Component Value Date    INR 1.42 06/12/2017       Radiology  CT A/P April 2017 reviewed  MRI liver/MRCP reviewed    Assessment  57-year-old male with history of decompensated alcoholic cirrhosis complicated with hepatic encephalopathy and ascites who presents for routine follow-up.  MELD-Na= 13.  Last ETOH= 06/09/2017.  Ascites and encephalopathy are well-controlled on current medication regimen.  He is not a candidate for liver transplant due to his ongoing alcohol use.      Diabetes appears " well-controlled on current insulin regimen.  The patient will follow up with GI Panc-Bili later this week for ongoing surveillance of a pancreatic lesion.  Up-to-date with surveillance of esophageal varices.  The patient is due for HCC screening in October and would prefer to get this at the time of his follow-up visit 2 months later in December.      We again discussed the importance of alcohol and smoking cessation to prevent further deterioration in general health and liver health.  We also discussed the risks of malignancy, stroke and myocardial infarction associated with ongoing smoking.     Plan  1.  ETOH cessation  2.  Smoking cessation  3.  Low Na diet  4.  Continue furosemide and amiloride at current doses  5.  Continue lactulose and rifaximin  6.  Follow-up in 6 months    Rich Little MD  Hepatology  [facility]

## 2017-06-12 NOTE — MR AVS SNAPSHOT
After Visit Summary   6/12/2017    Ravindra Fregoso    MRN: [id number]           Patient Information     Date Of Birth          1960        Visit Information        Provider Department      6/12/2017 8:00 AM Rich Haque MD [facility] Hepatology        Today's Diagnoses     Hepatic encephalopathy (H)    -  1    Alcoholic cirrhosis of liver with ascites (H)           Follow-ups after your visit        Your next 10 appointments already scheduled     Nathan 15, 2017  9:00 AM CDT   (Arrive by 8:45 AM)   New Patient Visit with Guru Doron Barnett MD   [facility] Pancreas and Biliary ([facility])    [address] [place] [address]  4th [facility] [id number] [phone]            Dec 11, 2017  7:00 AM CST   Lab with  LAB   [facility] Lab ([facility])    [address] [place] [id number] [phone]            Dec 11, 2017  7:30 AM CST   US ABDOMEN COMPLETE with UCUS2   [facility] US ([facility])    [address] [place] [id number] [phone]           Please bring a list of your medicines (including vitamins, minerals and over-the-counter drugs). Also, tell your doctor about any allergies you may have. Wear comfortable clothes and leave your valuables at home.  Adults: No eating or drinking for 8 hours before the exam. You may take medicine with a small sip of water.  Children: - Children 6+ years: No food or drink for 6 hours before exam. - Children 1-5 years: No food or drink for 4 hours before exam. - Infants, breast-fed: may have breast milk up to 2 hours before exam. - Infants, formula: may have bottle until 4 hours before exam.  Please call the Imaging Department at your exam site with any questions.            Dec 11, 2017  8:30 AM CST   (Arrive by 8:15 AM)   Return General Liver with Rich Wong MD   [facility]  "Hepatology ([facility])    [address] [facility]  3rd Floor  [facility] [address] [phone]              Future tests that were ordered for you today     Open Future Orders        Priority Expected Expires Ordered    US Abdomen Complete Routine 12/12/2017 6/12/2018 6/12/2017            Who to contact     If you have questions or need follow up information about today's clinic visit or your schedule please contact [facility] HEPATOLOGY directly at [phone].  Normal or non-critical lab and imaging results will be communicated to you by [employer] Village[employer], letter or phone within 4 business days after the clinic has received the results. If you do not hear from us within 7 days, please contact the clinic through [employer] or phone. If you have a critical or abnormal lab result, we will notify you by phone as soon as possible.  Submit refill requests through Preggers or call your pharmacy and they will forward the refill request to us. Please allow 3 business days for your refill to be completed.          Additional Information About Your Visit        Preggers Information     [employer]ers gives you secure access to your electronic health record. If you see a primary care provider, you can also send messages to your care team and make appointments. If you have questions, please call your primary care clinic.  If you do not have a primary care provider, please call [phone] and they will assist you.        Care EveryWhere ID     This is your Care EveryWhere ID. This could be used by other organizations to access your [place] medical records  [id number]        Your Vitals Were     Pulse Temperature Respirations Height Pulse Oximetry BMI (Body Mass Index)    69 97.9  F (36.6  C) (Oral) 16 1.626 m (5' 4.02\") 96% 26.18 kg/m2       Blood Pressure from Last 3 Encounters:   06/12/17 171/84   04/23/17 134/80   02/19/17 133/80    Weight from Last 3 Encounters:   06/12/17 69.2 kg (152 lb 9.6 oz)   04/22/17 " 66.2 kg (145 lb 15.1 oz)   02/19/17 67.6 kg (149 lb 0.5 oz)                 Today's Medication Changes          These changes are accurate as of: 6/12/17  4:49 PM.  If you have any questions, ask your nurse or doctor.               These medicines have changed or have updated prescriptions.        Dose/Directions    aMILoride 5 MG tablet   Commonly known as:  MIDAMOR   This may have changed:  when to take this   Used for:  Alcoholic cirrhosis of liver with ascites (H)   Changed by:  Rich Haque MD        Dose:  10 mg   Take 2 tablets (10 mg) by mouth daily   Quantity:  120 tablet   Refills:  1       insulin glargine 100 UNIT/ML injection   Commonly known as:  LANTUS   This may have changed:  how much to take   Used for:  Diabetes mellitus without complication (H)        Dose:  22 Units   Inject 22 Units Subcutaneous At Bedtime   Quantity:  3 mL   Refills:  3         Stop taking these medicines if you haven't already. Please contact your care team if you have questions.     ESCITALOPRAM OXALATE PO   Stopped by:  Rich Haque MD           insulin aspart 100 UNIT/ML injection   Commonly known as:  NovoLOG PEN   Stopped by:  Rich Haque MD                Where to get your medicines      Some of these will need a paper prescription and others can be bought over the counter.  Ask your nurse if you have questions.     You don't need a prescription for these medications     aMILoride 5 MG tablet                Primary Care Provider Office Phone # Fax #    Moo Flores [phone]       [facility] [address]   [facility] [address]        Thank you!     Thank you for choosing [facility] HEPATOLOGY  for your care. Our goal is always to provide you with excellent care. Hearing back from our patients is one way we can continue to improve our services. Please take a few minutes to complete the written survey that you may receive in the mail after your visit with us. Thank  you!             Your Updated Medication List - Protect others around you: Learn how to safely use, store and throw away your medicines at www.disposemymeds.org.          This list is accurate as of: 6/12/17  4:49 PM.  Always use your most recent med list.                   Brand Name Dispense Instructions for use    aMILoride 5 MG tablet    MIDAMOR    120 tablet    Take 2 tablets (10 mg) by mouth daily       amoxicillin 500 MG capsule    AMOXIL    4 capsule    Take 4 capsules (2,000 mg) by mouth See Admin Instructions       amylase-lipase-protease 81217 UNITS Cpep per EC capsule    CREON 24    60 capsule    Take 1 capsule (24,000 Units) by mouth 3 times daily (with meals)       blood glucose lancets standard    no brand specified    1 Box    Use to test blood sugar 4 times daily or as directed.       blood glucose monitoring test strip    ACCU-CHEK ROBERTO    100 strip    Use to test blood sugars 4 times daily or as directed.       bumetanide 1 MG tablet    BUMEX    90 tablet    Take 1 tablet (1 mg) by mouth daily       FLUoxetine 20 MG tablet      Take 20 mg by mouth daily       FOSAMAX 70 MG tablet   Generic drug:  alendronate      Take 70 mg by mouth every 7 days       HumaLOG KWIKpen 100 UNIT/ML injection   Generic drug:  insulin lispro      Inject Subcutaneous 3 times daily (before meals) sliding scale       insulin glargine 100 UNIT/ML injection    LANTUS    3 mL    Inject 22 Units Subcutaneous At Bedtime       lactulose 10 GM/15ML solution    CHRONULAC     Take 20 g by mouth 2 times daily       MULTIVITAL Tabs     30 tablet    Take 1 tablet by mouth       omeprazole 20 MG CR capsule    priLOSEC    90 capsule    Take 1 capsule (20 mg) by mouth daily       potassium chloride gillian er    K-DUR     Take 20 mEq by mouth daily       rifaximin 550 MG Tabs tablet    XIFAXAN    60 tablet    Take 1 tablet (550 mg) by mouth 2 times daily       Sharps Container Misc     1 each    1 Units 4 times daily as needed

## 2017-06-15 ENCOUNTER — OFFICE VISIT (OUTPATIENT)
Dept: GASTROENTEROLOGY | Facility: CLINIC | Age: 57
End: 2017-06-15

## 2017-06-15 VITALS
OXYGEN SATURATION: 95 % | HEART RATE: 70 BPM | SYSTOLIC BLOOD PRESSURE: 153 MMHG | HEIGHT: 64 IN | TEMPERATURE: 98.3 F | DIASTOLIC BLOOD PRESSURE: 91 MMHG | BODY MASS INDEX: 26.36 KG/M2 | WEIGHT: 154.4 LBS

## 2017-06-15 DIAGNOSIS — K86.0 CHRONIC ALCOHOLIC PANCREATITIS (H): Primary | ICD-10-CM

## 2017-06-15 DIAGNOSIS — E55.9 VITAMIN D DEFICIENCY: ICD-10-CM

## 2017-06-15 DIAGNOSIS — Z13.820 SCREENING FOR OSTEOPOROSIS: ICD-10-CM

## 2017-06-15 DIAGNOSIS — E56.9 VITAMIN DEFICIENCY: ICD-10-CM

## 2017-06-15 DIAGNOSIS — E11.9 TYPE 2 DIABETES MELLITUS (H): ICD-10-CM

## 2017-06-15 DIAGNOSIS — K86.0 CHRONIC ALCOHOLIC PANCREATITIS (H): ICD-10-CM

## 2017-06-15 LAB
ALBUMIN SERPL-MCNC: 3 G/DL (ref 3.4–5)
ALP SERPL-CCNC: 114 U/L (ref 40–150)
ALT SERPL W P-5'-P-CCNC: 38 U/L (ref 0–70)
ANION GAP SERPL CALCULATED.3IONS-SCNC: 8 MMOL/L (ref 3–14)
AST SERPL W P-5'-P-CCNC: 55 U/L (ref 0–45)
BILIRUB SERPL-MCNC: 1.5 MG/DL (ref 0.2–1.3)
BUN SERPL-MCNC: 13 MG/DL (ref 7–30)
CALCIUM SERPL-MCNC: 8.1 MG/DL (ref 8.5–10.1)
CHLORIDE SERPL-SCNC: 108 MMOL/L (ref 94–109)
CO2 SERPL-SCNC: 26 MMOL/L (ref 20–32)
CREAT SERPL-MCNC: 0.74 MG/DL (ref 0.66–1.25)
DEPRECATED CALCIDIOL+CALCIFEROL SERPL-MC: 26 UG/L (ref 20–75)
FERRITIN SERPL-MCNC: 163 NG/ML (ref 26–388)
FOLATE SERPL-MCNC: 23.9 NG/ML
GFR SERPL CREATININE-BSD FRML MDRD: ABNORMAL ML/MIN/1.7M2
GLUCOSE SERPL-MCNC: 92 MG/DL (ref 70–99)
IRON SERPL-MCNC: 100 UG/DL (ref 35–180)
MAGNESIUM SERPL-MCNC: 1.9 MG/DL (ref 1.6–2.3)
PHOSPHATE SERPL-MCNC: 3.1 MG/DL (ref 2.5–4.5)
POTASSIUM SERPL-SCNC: 4 MMOL/L (ref 3.4–5.3)
PREALB SERPL IA-MCNC: 9 MG/DL (ref 15–45)
PROT SERPL-MCNC: 7.1 G/DL (ref 6.8–8.8)
SODIUM SERPL-SCNC: 141 MMOL/L (ref 133–144)
VIT B12 SERPL-MCNC: 1835 PG/ML (ref 193–986)

## 2017-06-15 ASSESSMENT — PAIN SCALES - GENERAL: PAINLEVEL: NO PAIN (0)

## 2017-06-15 NOTE — MR AVS SNAPSHOT
After Visit Summary   6/15/2017    Ravindra Fregoso    MRN: [id number]           Patient Information     Date Of Birth          1960        Visit Information        Provider Department      6/15/2017 9:00 AM Guru Doron Barnett MD [facility] Pancreas and Biliary        Today's Diagnoses     Chronic alcoholic pancreatitis (H)    -  1    Vitamin deficiency        Type 2 diabetes mellitus (H)        Vitamin D deficiency        Screening for osteoporosis          Care Instructions    1. Labs on first floor    2. Refill of Creon to be sent to your pharmacy    3. Ct scan with IV contrast in 3 months ~ call [phone] to schedule      Follow up: 3 months in clinic with Dr. Prince    Please call with any questions or concerns regarding your clinic visit today.    It is a pleasure being involved in your health care.    Contacts post-consultation depending on your need:    Schedule Clinic Appointments       [phone]       M-F 7:30 - 5 pm    Clarissa VERMA RN Coordinator              [phone] #4   M-F 8:00 - 4:30pm    Procedure Scheduling                   [phone]    My Chart is available 24 hours a day and is a secure way to access your records and communicate with your care team.  I strongly recommend signing up if you haven't already done so, if you are comfortable with computers.  If you would like to inquire about this or are having problems with My Chart access, you may call [phone] or go online at [email or web address].  Please allow at least 24 hours for a response and extra time on weekends and Holidays.              Follow-ups after your visit        Your next 10 appointments already scheduled     Dec 11, 2017  7:00 AM CST   Lab with  LAB    Health Lab ([facility])    [address]  1st Floor  [facility] [address] [phone]            Dec 11, 2017  7:30 AM CST   US ABDOMEN COMPLETE with [address] [facility]  US ([facility])    [address] [place] [id number] [phone]           Please bring a list of your medicines (including vitamins, minerals and over-the-counter drugs). Also, tell your doctor about any allergies you may have. Wear comfortable clothes and leave your valuables at home.  Adults: No eating or drinking for 8 hours before the exam. You may take medicine with a small sip of water.  Children: - Children 6+ years: No food or drink for 6 hours before exam. - Children 1-5 years: No food or drink for 4 hours before exam. - Infants, breast-fed: may have breast milk up to 2 hours before exam. - Infants, formula: may have bottle until 4 hours before exam.  Please call the Imaging Department at your exam site with any questions.            Dec 11, 2017  8:30 AM CST   (Arrive by 8:15 AM)   Return General Liver with Rich Wong MD   [facility] Hepatology ([facility])    [address] [place] [id number] [phone]              Future tests that were ordered for you today     Open Future Orders        Priority Expected Expires Ordered    Zinc STAT  6/15/2018 6/15/2017    Vitamin D Deficiency STAT  6/15/2018 6/15/2017    Folate STAT  6/15/2018 6/15/2017    Iron STAT  6/15/2018 6/15/2017    Ferritin STAT  6/15/2018 6/15/2017    Vitamin E STAT  6/15/2018 6/15/2017    Vitamin A STAT  6/15/2018 6/15/2017    Magnesium STAT  6/15/2018 6/15/2017    Phosphorus STAT  6/15/2018 6/15/2017    Comprehensive metabolic panel STAT  6/15/2018 6/15/2017    Prealbumin STAT  6/15/2018 6/15/2017    DX Hip/Pelvis/Spine Routine  6/15/2018 6/15/2017    CT Abdomen Pelvis w Contrast Routine 9/1/2017 6/15/2018 6/15/2017            Who to contact     Please call your clinic at [phone] to:    Ask questions about your health    Make or cancel appointments    Discuss your medicines    Learn about your test results    Speak to  "your doctor   If you have compliments or concerns about an experience at your clinic, or if you wish to file a complaint, please contact [facility] Patient Relations at [phone] or email us at [email or web address]         Additional Information About Your Visit        MyChart Information     [employer] gives you secure access to your electronic health record. If you see a primary care provider, you can also send messages to your care team and make appointments. If you have questions, please call your primary care clinic.  If you do not have a primary care provider, please call [phone] and they will assist you.      [employer] is an electronic gateway that provides easy, online access to your medical records. With [employer], you can request a clinic appointment, read your test results, renew a prescription or communicate with your care team.     To access your existing account, please contact your [facility] Clinic or call [phone] for assistance.        Care EveryWhere ID     This is your Care EveryWhere ID. This could be used by other organizations to access your [place] medical records  [id number]        Your Vitals Were     Pulse Temperature Height Pulse Oximetry BMI (Body Mass Index)       70 98.3  F (36.8  C) 1.626 m (5' 4.02\") 95% 26.49 kg/m2        Blood Pressure from Last 3 Encounters:   06/15/17 (!) 153/91   06/12/17 171/84   04/23/17 134/80    Weight from Last 3 Encounters:   06/15/17 70 kg (154 lb 6.4 oz)   06/12/17 69.2 kg (152 lb 9.6 oz)   04/22/17 66.2 kg (145 lb 15.1 oz)              We Performed the Following     Vitamin B12          Today's Medication Changes          These changes are accurate as of: 6/15/17  9:59 AM.  If you have any questions, ask your nurse or doctor.               These medicines have changed or have updated prescriptions.        Dose/Directions    insulin glargine 100 UNIT/ML injection   Commonly known as:  " LANTUS   This may have changed:  how much to take   Used for:  Diabetes mellitus without complication (H)        Dose:  22 Units   Inject 22 Units Subcutaneous At Bedtime   Quantity:  3 mL   Refills:  3                Primary Care Provider Office Phone # Fax #    Moo Flores [phone]       [facility] [address]   [facility] [address]        Thank you!     Thank you for choosing [facility] PANCREAS AND BILIARY  for your care. Our goal is always to provide you with excellent care. Hearing back from our patients is one way we can continue to improve our services. Please take a few minutes to complete the written survey that you may receive in the mail after your visit with us. Thank you!             Your Updated Medication List - Protect others around you: Learn how to safely use, store and throw away your medicines at www.disposemymeds.org.          This list is accurate as of: 6/15/17  9:59 AM.  Always use your most recent med list.                   Brand Name Dispense Instructions for use    aMILoride 5 MG tablet    MIDAMOR    120 tablet    Take 2 tablets (10 mg) by mouth daily       amoxicillin 500 MG capsule    AMOXIL    4 capsule    Take 4 capsules (2,000 mg) by mouth See Admin Instructions       amylase-lipase-protease 04904 UNITS Cpep per EC capsule    CREON 24    60 capsule    Take 1 capsule (24,000 Units) by mouth 3 times daily (with meals)       blood glucose lancets standard    no brand specified    1 Box    Use to test blood sugar 4 times daily or as directed.       blood glucose monitoring test strip    ACCU-CHEK ROBERTO    100 strip    Use to test blood sugars 4 times daily or as directed.       bumetanide 1 MG tablet    BUMEX    90 tablet    Take 1 tablet (1 mg) by mouth daily       FLUoxetine 20 MG tablet      Take 20 mg by mouth daily       FOSAMAX 70 MG tablet   Generic drug:  alendronate      Take 70 mg by mouth every 7 days       HumaLOG KWIKpen 100 UNIT/ML injection    Generic drug:  insulin lispro      Inject Subcutaneous 3 times daily (before meals) sliding scale       insulin glargine 100 UNIT/ML injection    LANTUS    3 mL    Inject 22 Units Subcutaneous At Bedtime       lactulose 10 GM/15ML solution    CHRONULAC     Take 20 g by mouth 2 times daily       MULTIVITAL Tabs     30 tablet    Take 1 tablet by mouth       omeprazole 20 MG CR capsule    priLOSEC    90 capsule    Take 1 capsule (20 mg) by mouth daily       potassium chloride gillian er    K-DUR     Take 20 mEq by mouth daily       rifaximin 550 MG Tabs tablet    XIFAXAN    60 tablet    Take 1 tablet (550 mg) by mouth 2 times daily       Sharps Container Misc     1 each    1 Units 4 times daily as needed

## 2017-06-15 NOTE — LETTER
6/15/2017       RE: Ravindra Fregoso  [address] [facility] [address]     Dear Colleague,    Thank you for referring your patient, Ravindra Fregoso, to the [facility] at [facility]. Please see a copy of my visit note below.    REFERRING PHYSICIAN:  Rich Little MD      REASON FOR CONSULTATION:  Chronic calcific pancreatitis with focal necrotic collection.      CHIEF COMPLAINT:  Nil.      HISTORY OF PRESENT ILLNESS:  This is a 57-year-old gentleman with longstanding history of decompensated alcoholic cirrhosis who was initially followed by Dr. Jacobo Delaney and is currently followed by Dr. Rich Little with cirrhosis complicated by ascites, esophageal varices and hepatic encephalopathy, who is being referred here for abnormal imaging.  Of note, the patient was recently admitted for new onset diabetes, dizziness and fatigue in 01/2017.  He had a CT scan performed on 04/21/2017, which show a hypodense lesion centered in the pancreatic body or uncinate process which had not changed since 2017, which was thought to favor walled off necrosis in the setting of extensive parenchymal calcifications, atrophy and ductal dilation.  Subsequently, he was scheduled for an endoscopic ultrasound, but since he was asymptomatic, we had cancelled it at that point.  Of note, he currently denies nausea, vomiting, abdominal pain.  He denies dysphagia and odynophagia.  He denies melena, hematochezia and hematemesis.  He has not lost weight, has a decent appetite.  He does report he has gained 2 pounds since discharge.  Of note, he continues to drink alcohol, a couple of drinks of vodka, and he is being followed by Dr. Rich Little for decompensated cirrhosis.  He not currently listed for transplantation.  Also of note, he has been smoking for a long time.      Also of note, the patient had undergone an endoscopic ultrasound by Dr. Sanches which showed grade 1 esophageal  varices, portal hypertensive gastropathy and EUS features consistent with chronic pancreatitis, which included markedly dilated main pancreatic duct with a single obstructing pancreatic duct stone in the major pancreatic duct at the major papilla, which accounted for his abnormal ultrasound findings.  He also was noted to have cholelithiasis without choledocholithiasis.      PAST MEDICAL HISTORY:   1.  Alcohol-related cirrhosis complicated by ascites, portal hypertension, esophageal varices and hepatic encephalopathy.   2.  Depression.   3.  Osteoporosis.   4.  Colonic polyps.      PAST SURGICAL HISTORY:  Orthopedic surgery, inguinal hernia surgery, endoscopy and endoscopic ultrasound.      FAMILY HISTORY:  No history of pancreatitis, no history of pancreatic cancer, no history of liver disease and there is no history of bile duct cancer.      SOCIAL HISTORY:  He smokes 1-5 packs of cigarettes per week currently.  He used to smoke 2-3 packs per day for the past 45 years.  He drinks a couple of drinks of vodka a week but used to drink heavily in the past.  He has 3 kids.  He lives with his wife.      ALLERGIES:  Atorvastatin.      MEDICATIONS:  See Epic.      PHYSICAL EXAMINATION:   VITAL SIGNS:  Stable.   GENERAL:  He is not in acute distress.   HEAD AND NECK:  No pallor.  No icterus.  Pupils were equally reacting to light.  Extraocular movements were intact.   CHEST:  Lungs are clear to auscultation.   CARDIOVASCULAR:  S1, S2 heard.  A systolic murmur was heard in the heart.   ABDOMEN:  Umbilical hernia seen, soft, distended, fluid thrill present.  Spleen was palpable.  Bowel sounds are heard.   NEUROLOGIC:  Alert, awake, oriented to time, place and person.  No asterixis.   EXTREMITIES:  No pedal edema.   SKIN:  Showed petechial changes, spider nevi in the chest.      LABORATORY DATA:  The following are the labs from 06/12/2017: Sodium 143, potassium 4.2, chloride 108, bicarbonate 16, BUN 15, creatinine 0.8,  C-peptide of 1.7, total bilirubin 2, alkaline phosphatase 119, ALT 40, AST 66, hemoglobin 13, hematocrit 38.6, platelets of 119,000.  INR of 1.42.  His fecal elastase was noted to be less than 6 indicating severe exocrine insufficiency.        IMAGING:  A CT scan of the abdomen and pelvis done on 04/21/2017 showed sequelae of chronic pancreatitis with extensive parenchymal calcifications, pancreatic duct is dilated throughout the body and head, and there is a central hypodense lesion centered in the pancreatic head or uncinate process measuring 3.4 x 3.9 x 3.3 cm.  No appreciable enhancement on internal septations.  There was a cirrhotic-appearing liver with evidence of portal hypertension including ascites and portosystemic collaterals and cholelithiasis.      REVIEW OF SYSTEMS:  A complete 10-point review of systems was performed and noted to be negative except as above.      ASSESSMENT AND PLAN:  This is a 57-year-old gentleman with history of longstanding, decompensated alcohol-related cirrhosis complicated by ascites, esophageal varices, hepatic encephalopathy, MELD score of 14 and currently not listed for transplantation because of active drinking who is here for evaluation for chronic calcific pancreatitis, as well as in the necrotic collection in the pancreatic head and neck.  The patient also has severe pancreatic exocrine insufficiency with a fecal elastase less than 6.  He continues to drink and smoke, but he is currently asymptomatic from the pancreatitis standpoint.      The following are our recommendations:   1.  We will recommend to continue Creon.  This should be dosed as follows:  Creon 24,000 units 3-4 capsules with meals and 2-3 with snacks.  We explained to the patient that he has severe pancreatic exocrine insufficiency.  In other words, he may be having severe protein and fat malabsorption, so we will check a nutrition panel today which would include vitamin A, D, E and K; zinc; iron panel  with folic acid and B12.  We will supplement micronutrients as needed.   3.  We will recommend a DEXA scan as per protocol for his osteoporosis.  We explained to the patient that is at risk for osteoporotic fractures additionally from chronic pancreatitis as well in addition to cirrhosis.   4.  The patient needs to be scheduled for his colonoscopy, as he has a history of polyps and is due for colonoscopy now.  This can be coordinated with Dr. Rich Little's office.   5.  He has been advised to abstain from smoking and alcohol completely.  We explained to him the cumulative effect of smoking and alcohol on the pancreas which would include further progression of his disease and smoking accentuates the risk of pancreatic cancer as well.   6.  Currently, the patient is asymptomatic despite having an obstructing stone in the main pancreatic duct, and we will, therefore, hold off on doing ERCP procedure.  We explained to the patient that we will consider ERCP only if he has disabling symptoms which would include severe acute abdominal pain needing repeated hospitalizations.  Since he is asymptomatic, we will hold off on ERCP, as the risk of the pancreatic endotherapy outweighs the benefit when patients are asymptomatic.   7.  Collection was reviewed with Dr. Sanches, and we have agreed that it appears to be a focal necrotic collection.  Again, patient denies any symptoms attributable to the walled off necrosis collection including fevers, chills, night sweats or gastric outlet or biliary tract outlet obstruction symptoms.  We will offer endoscopic drainage only if he is clinically symptomatic, and we will instead elect to follow.   8.  We will recommend a CT scan with pancreas protocol in 3 months and follow up with me in 3 months.      A total of 45 minutes were spent with more than 50% of the time in counseling, especially for smoking and alcohol cessation and discussing the cumulative effect of smoking and alcohol on  the pancreas and also for coordinating further management plans.     Again, thank you for allowing me to participate in the care of your patient.    Sincerely,    Guru Ericka MD

## 2017-06-15 NOTE — PROGRESS NOTES
REFERRING PHYSICIAN:  Rich Little MD      REASON FOR CONSULTATION:  Chronic calcific pancreatitis with focal necrotic collection.      CHIEF COMPLAINT:  Nil.      HISTORY OF PRESENT ILLNESS:  This is a 57-year-old gentleman with longstanding history of decompensated alcoholic cirrhosis who was initially followed by Dr. Jacobo Delaney and is currently followed by Dr. Rich Little with cirrhosis complicated by ascites, esophageal varices and hepatic encephalopathy, who is being referred here for abnormal imaging.  Of note, the patient was recently admitted for new onset diabetes, dizziness and fatigue in 01/2017.  He had a CT scan performed on 04/21/2017, which show a hypodense lesion centered in the pancreatic body or uncinate process which had not changed since 2017, which was thought to favor walled off necrosis in the setting of extensive parenchymal calcifications, atrophy and ductal dilation.  Subsequently, he was scheduled for an endoscopic ultrasound, but since he was asymptomatic, we had cancelled it at that point.  Of note, he currently denies nausea, vomiting, abdominal pain.  He denies dysphagia and odynophagia.  He denies melena, hematochezia and hematemesis.  He has not lost weight, has a decent appetite.  He does report he has gained 2 pounds since discharge.  Of note, he continues to drink alcohol, a couple of drinks of vodka, and he is being followed by Dr. Rich Little for decompensated cirrhosis.  He not currently listed for transplantation.  Also of note, he has been smoking for a long time.      Also of note, the patient had undergone an endoscopic ultrasound by Dr. Sanches which showed grade 1 esophageal varices, portal hypertensive gastropathy and EUS features consistent with chronic pancreatitis, which included markedly dilated main pancreatic duct with a single obstructing pancreatic duct stone in the major pancreatic duct at the major papilla, which accounted for his abnormal ultrasound  findings.  He also was noted to have cholelithiasis without choledocholithiasis.      PAST MEDICAL HISTORY:   1.  Alcohol-related cirrhosis complicated by ascites, portal hypertension, esophageal varices and hepatic encephalopathy.   2.  Depression.   3.  Osteoporosis.   4.  Colonic polyps.      PAST SURGICAL HISTORY:  Orthopedic surgery, inguinal hernia surgery, endoscopy and endoscopic ultrasound.      FAMILY HISTORY:  No history of pancreatitis, no history of pancreatic cancer, no history of liver disease and there is no history of bile duct cancer.      SOCIAL HISTORY:  He smokes 1-5 packs of cigarettes per week currently.  He used to smoke 2-3 packs per day for the past 45 years.  He drinks a couple of drinks of vodka a week but used to drink heavily in the past.  He has 3 kids.  He lives with his wife.      ALLERGIES:  Atorvastatin.      MEDICATIONS:  See Epic.      PHYSICAL EXAMINATION:   VITAL SIGNS:  Stable.   GENERAL:  He is not in acute distress.   HEAD AND NECK:  No pallor.  No icterus.  Pupils were equally reacting to light.  Extraocular movements were intact.   CHEST:  Lungs are clear to auscultation.   CARDIOVASCULAR:  S1, S2 heard.  A systolic murmur was heard in the heart.   ABDOMEN:  Umbilical hernia seen, soft, distended, fluid thrill present.  Spleen was palpable.  Bowel sounds are heard.   NEUROLOGIC:  Alert, awake, oriented to time, place and person.  No asterixis.   EXTREMITIES:  No pedal edema.   SKIN:  Showed petechial changes, spider nevi in the chest.      LABORATORY DATA:  The following are the labs from 06/12/2017: Sodium 143, potassium 4.2, chloride 108, bicarbonate 16, BUN 15, creatinine 0.8, C-peptide of 1.7, total bilirubin 2, alkaline phosphatase 119, ALT 40, AST 66, hemoglobin 13, hematocrit 38.6, platelets of 119,000.  INR of 1.42.  His fecal elastase was noted to be less than 6 indicating severe exocrine insufficiency.        IMAGING:  A CT scan of the abdomen and pelvis done on  04/21/2017 showed sequelae of chronic pancreatitis with extensive parenchymal calcifications, pancreatic duct is dilated throughout the body and head, and there is a central hypodense lesion centered in the pancreatic head or uncinate process measuring 3.4 x 3.9 x 3.3 cm.  No appreciable enhancement on internal septations.  There was a cirrhotic-appearing liver with evidence of portal hypertension including ascites and portosystemic collaterals and cholelithiasis.      REVIEW OF SYSTEMS:  A complete 10-point review of systems was performed and noted to be negative except as above.      ASSESSMENT AND PLAN:  This is a 57-year-old gentleman with history of longstanding, decompensated alcohol-related cirrhosis complicated by ascites, esophageal varices, hepatic encephalopathy, MELD score of 14 and currently not listed for transplantation because of active drinking who is here for evaluation for chronic calcific pancreatitis, as well as in the necrotic collection in the pancreatic head and neck.  The patient also has severe pancreatic exocrine insufficiency with a fecal elastase less than 6.  He continues to drink and smoke, but he is currently asymptomatic from the pancreatitis standpoint.      The following are our recommendations:   1.  We will recommend to continue Creon.  This should be dosed as follows:  Creon 24,000 units 3-4 capsules with meals and 2-3 with snacks.  We explained to the patient that he has severe pancreatic exocrine insufficiency.  In other words, he may be having severe protein and fat malabsorption, so we will check a nutrition panel today which would include vitamin A, D, E and K; zinc; iron panel with folic acid and B12.  We will supplement micronutrients as needed.   3.  We will recommend a DEXA scan as per protocol for his osteoporosis.  We explained to the patient that is at risk for osteoporotic fractures additionally from chronic pancreatitis as well in addition to cirrhosis.   4.  The  patient needs to be scheduled for his colonoscopy, as he has a history of polyps and is due for colonoscopy now.  This can be coordinated with Dr. Rich Little's office.   5.  He has been advised to abstain from smoking and alcohol completely.  We explained to him the cumulative effect of smoking and alcohol on the pancreas which would include further progression of his disease and smoking accentuates the risk of pancreatic cancer as well.   6.  Currently, the patient is asymptomatic despite having an obstructing stone in the main pancreatic duct, and we will, therefore, hold off on doing ERCP procedure.  We explained to the patient that we will consider ERCP only if he has disabling symptoms which would include severe acute abdominal pain needing repeated hospitalizations.  Since he is asymptomatic, we will hold off on ERCP, as the risk of the pancreatic endotherapy outweighs the benefit when patients are asymptomatic.   7.  Collection was reviewed with Dr. Sanches, and we have agreed that it appears to be a focal necrotic collection.  Again, patient denies any symptoms attributable to the walled off necrosis collection including fevers, chills, night sweats or gastric outlet or biliary tract outlet obstruction symptoms.  We will offer endoscopic drainage only if he is clinically symptomatic, and we will instead elect to follow.   8.  We will recommend a CT scan with pancreas protocol in 3 months and follow up with me in 3 months.      A total of 45 minutes were spent with more than 50% of the time in counseling, especially for smoking and alcohol cessation and discussing the cumulative effect of smoking and alcohol on the pancreas and also for coordinating further management plans.

## 2017-06-15 NOTE — PATIENT INSTRUCTIONS
1. Labs on first floor    2. Refill of Creon to be sent to your pharmacy    3. Ct scan with IV contrast in 3 months ~ call [phone] to schedule      Follow up: 3 months in clinic with Dr. Prince    Please call with any questions or concerns regarding your clinic visit today.    It is a pleasure being involved in your health care.    Contacts post-consultation depending on your need:    Schedule Clinic Appointments       [phone]       M-F 7:30 - 5 pm    Clarissa VERMA RN Coordinator              [phone] #4   M-F 8:00 - 4:30pm    Procedure Scheduling                   [phone]    My Chart is available 24 hours a day and is a secure way to access your records and communicate with your care team.  I strongly recommend signing up if you haven't already done so, if you are comfortable with computers.  If you would like to inquire about this or are having problems with My Chart access, you may call [phone] or go online at [email or web address].  Please allow at least 24 hours for a response and extra time on weekends and Holidays.

## 2017-06-16 DIAGNOSIS — K86.89 PANCREATIC INSUFFICIENCY: ICD-10-CM

## 2017-06-17 LAB — ZINC SERPL-MCNC: 58 UG/ML

## 2017-06-18 LAB
A-TOCOPHEROL VIT E SERPL-MCNC: 8.9
ANNOTATION COMMENT IMP: ABNORMAL
BETA+GAMMA TOCOPHEROL SERPL-MCNC: 0.6 MG/DL
RETINYL PALMITATE SERPL-MCNC: 0.05 UG/ML
VIT A SERPL-MCNC: 0.21 UG/ML

## 2017-06-27 ENCOUNTER — TELEPHONE (OUTPATIENT)
Dept: GASTROENTEROLOGY | Facility: CLINIC | Age: 57
End: 2017-06-27

## 2017-07-05 ENCOUNTER — CARE COORDINATION (OUTPATIENT)
Dept: GASTROENTEROLOGY | Facility: CLINIC | Age: 57
End: 2017-07-05

## 2017-07-05 NOTE — PROGRESS NOTES
Received message to send his lab work to his local dietician.  [email or web address]     [email or web address]        phone number [phone]    Nilda press 2    Vianey press 3     Faxed lab work Vit D and Vit A to Fx: [phone].    Clarissa VERMA RN Coordinator  Dr. Acosta, Dr. Karimi & Dr. Barnett   [facility]  [phone] #4

## 2017-07-31 ENCOUNTER — CARE COORDINATION (OUTPATIENT)
Dept: GASTROENTEROLOGY | Facility: CLINIC | Age: 57
End: 2017-07-31

## 2017-07-31 NOTE — PROGRESS NOTES
Faxed paperwork  (8 pages) to [employer] assistance [employer] for Creon prescription. Fx: [phone]    Clarissa VERMA RN Coordinator  Dr. Acosta, Dr. Karimi & Dr. Barnett   [facility]  [phone]

## 2017-09-13 ENCOUNTER — TELEPHONE (OUTPATIENT)
Dept: GASTROENTEROLOGY | Facility: CLINIC | Age: 57
End: 2017-09-13

## 2017-09-13 NOTE — TELEPHONE ENCOUNTER
Left message for patient reminding of upcomming appointment 9/21 with Dr. Prince.  Number provided if needs to reschedule.

## 2017-09-29 DIAGNOSIS — K21.9 GASTROESOPHAGEAL REFLUX DISEASE, ESOPHAGITIS PRESENCE NOT SPECIFIED: ICD-10-CM

## 2017-09-29 DIAGNOSIS — K70.31 ALCOHOLIC CIRRHOSIS OF LIVER WITH ASCITES (H): ICD-10-CM

## 2017-10-06 DIAGNOSIS — K70.31 ALCOHOLIC CIRRHOSIS OF LIVER WITH ASCITES (H): ICD-10-CM

## 2017-10-06 RX ORDER — BUMETANIDE 1 MG/1
1 TABLET ORAL DAILY
Qty: 90 TABLET | Refills: 1 | Status: SHIPPED | OUTPATIENT
Start: 2017-10-06 | End: 2018-03-12

## 2017-10-16 DIAGNOSIS — K70.31 ALCOHOLIC CIRRHOSIS OF LIVER WITH ASCITES (H): Primary | ICD-10-CM

## 2017-10-16 RX ORDER — POTASSIUM CHLORIDE 1500 MG/1
20 TABLET, EXTENDED RELEASE ORAL 2 TIMES DAILY
Qty: 60 TABLET | Refills: 3 | Status: SHIPPED | OUTPATIENT
Start: 2017-10-16 | End: 2018-06-20

## 2017-11-08 ENCOUNTER — TELEPHONE (OUTPATIENT)
Dept: GASTROENTEROLOGY | Facility: CLINIC | Age: 57
End: 2017-11-08

## 2017-11-08 NOTE — TELEPHONE ENCOUNTER
Spoke with patient reminding of upcoming appointment with Dr. Prince on 11/16.  He has number if needs to reschedule. Reminded of CT at 10.  He plans to attend.

## 2017-11-09 DIAGNOSIS — K70.31 ALCOHOLIC CIRRHOSIS OF LIVER WITH ASCITES (H): Primary | ICD-10-CM

## 2017-11-16 ENCOUNTER — OFFICE VISIT (OUTPATIENT)
Dept: GASTROENTEROLOGY | Facility: CLINIC | Age: 57
End: 2017-11-16

## 2017-11-16 VITALS
SYSTOLIC BLOOD PRESSURE: 146 MMHG | WEIGHT: 161.4 LBS | TEMPERATURE: 98.7 F | HEART RATE: 82 BPM | HEIGHT: 64 IN | DIASTOLIC BLOOD PRESSURE: 82 MMHG | BODY MASS INDEX: 27.55 KG/M2 | OXYGEN SATURATION: 97 %

## 2017-11-16 DIAGNOSIS — Z12.11 SPECIAL SCREENING FOR MALIGNANT NEOPLASMS, COLON: Primary | ICD-10-CM

## 2017-11-16 ASSESSMENT — PAIN SCALES - GENERAL: PAINLEVEL: NO PAIN (0)

## 2017-11-16 NOTE — NURSING NOTE
"Chief Complaint   Patient presents with     Clinic Care Coordination - Initial     3 month f/u        Vitals:    11/16/17 1056   BP: 146/82   BP Location: Left arm   Patient Position: Chair   Cuff Size: Adult Regular   Pulse: 82   Temp: 98.7  F (37.1  C)   TempSrc: Oral   SpO2: 97%   Weight: 161 lb 6.4 oz   Height: 5' 4.02\"       Body mass index is 27.69 kg/(m^2).    Sawyer BECKER LPN                     "

## 2017-11-16 NOTE — LETTER
11/16/2017       RE: Ravindra Fregoso  [address] [facility] [address]     Dear Colleague,    Thank you for referring your patient, Ravindra Fregoso, to the [facility] PANCREAS AND BILIARY at [facility]. Please see a copy of my visit note below.    REFERRING PHYSICIAN:  Moo Flores MD      REASON FOR CONSULTATION:  Chronic calcific pancreatitis with focal walled off necrotic collection.      CHIEF COMPLAINT:  Tiredness.      HISTORY OF PRESENT ILLNESS:    This is a 57-year-old white male who is accompanied by his wife with longstanding history of decompensated alcoholic cirrhosis and chronic calcific chronic pancreatitis secondary to alcohol abuse .  He was initially followed by Dr. Jacobo Delaney, currently followed by Dr. Rich Little, with complications of ascites, esophageal varices and hepatic encephalopathy who is here for a followup for chronic calcific pancreatitis secondary to alcohol abuse.  Of note, the patient has established his care in the Pancreas Biliary Clinic with me in 06/2017, and he was seen prior to that in 01/2017 with new onset diabetes, dizziness and fatigue, and there was a hypodense lesion in the pancreatic body and uncinate process which appeared more like focal walled off necrosis with extensive pancreatic calcifications, atrophy and ductal dilation.  He was subsequently scheduled for endoscopic ultrasound, but since he was asymptomatic and after further reviewing, it was established to be focal walled off necrosis and we held off on it.Also as a part of his workup, he has undergone endoscopic ultrasound by Dr. Sanches which showed only grade 1 esophageal varices, portal hypertensive gastropathy and EUS features consistent with chronic pancreatitis with markedly dilated main pancreatic duct and left single obstructing stone in the main pancreatic duct to the major papilla.        He was then seen in clinic and was started on pancreatic  enzymes, and his nutrition panel showed deficiency of vitamin A and zinc, which were all supplemented.  He subsequently followed today in clinic.  Since last clinic visit, the patient continues to deny abdominal pain, nausea or vomiting.  He denies dysphagia or odynophagia.  Denies melena, hematochezia and hematemesis.  He, however, reports poor appetite and also extreme fatigue which is similar to his previous clinic visit.  Of note, he continues to drink alcohol and is being followed by Dr. Rich Little for decompensated cirrhosis.  He is currently not listed for transplantation because of his alcohol habits, and he also continues to smoke for a long time.      PAST MEDICAL HISTORY:     1.  Alcohol-related cirrhosis complicated by ascites, portal hypertension, esophageal varices and hepatic encephalopathy.   2.  Depression.   3.  Osteoporosis.   4.  Colonic polyps.      PAST SURGICAL HISTORY:  Orthopedic surgery, inguinal hernia surgery, endoscopy and endoscopic ultrasound.      FAMILY HISTORY:  No history of pancreatitis.  No history of pancreatic cancer.  No history of liver disease.  No history of bladder cancer.      SOCIAL HISTORY:  Smokes about 1-5 packs of cigarettes per week.  He used to smoke 2-3 packs per day for the past 45 years.  He drinks a couple of drinks of vodka a weeks but used to drink heavily in the past.  He has 3 kids and lives with his wife.      ALLERGIES:  Atorvastatin.      MEDICATIONS:  See Epic.      PHYSICAL EXAMINATION:   GENERAL:  He is not in acute distress.   HEAD AND NECK:  No pallor, no icterus.  Pupils are equally reacting to light.   CHEST:  Lungs clear to auscultation.   CARDIOVASCULAR:  S1, S2 heard.  Systolic murmur is audible.   ABDOMEN:  Soft, nontender, no umbilical hernia present.  Spleen was palpable.   NEUROLOGIC:  Alert, awake, oriented to time, place, person, and no asterixis.   EXTREMITIES:  No pedal edema.   SKIN:   spider nevi in the chest.      LABORATORY DATA:   The following are her most recent labs:  Creatinine 0.8 and GFR of more than 90.      IMAGING DATA:  CT scan today shows unchanged dilation of the main pancreatic duct measuring up to 10 mm, coarse calcifications throughout the head, body and tail of the pancreas consistent with chronic pancreatitis, grossly unchanged 4.7 x 3.7 cm hypodense lesion in the head of the pancreas which is likely walled off necrosis.      ASSESSMENT AND PLAN:  This is a 57-year-old gentleman with a history of decompensated alcohol-induced cirrhosis, currently not listed for transplant because of ACTIVE ALCOHOL ABUSE  complicated by ascites, esophageal varices, portal hypertensive gastropathy, followed by Dr. Rich Little from a liver standpoint, chronic calcific pancreatitis complicated by a possible focal walled off necrosis in the head, currently asymptomatic from pancreas standpoint.  He is here for a followup visit.    His main complaint today is fatigue and was enquiring about paracentesis.     RECOMMENDATIONS:     We reviewed his CT scan findings.  We explained to him that the size of the collection appears to be stable in size, and it is sterile in nature  since he is clinically asymptomatic (no significant abdominal pain, fevers, chills or signs of gastric outlet obstruction), we will hold off on any pancreatic endotherapy.  We did explain to him that if he has a significant amount of pain, we would consider endoscopic transluminal drainage followed by ERCP with plans to do pancreatic endotherapy.  We will defer this to Dr. Rich Little.  We did explain to the patient that he does not have significant ascites today on the exam to warrant a paracentesis.       1. His fatigue is possibly due to his decompensated cirrhosis. We will defer further management of his liver disease and its complications to Dr. Rich Little whom he is scheduled to meet in near future.   2.  We will recommend colonoscopy for followup for polyps because he  is due now, but the patient has adamantly refused saying that he has a hard time with his prep, and we will continue to recommend colonoscopies.   3.  We will recommend continuing pancreatic enzymes, Creon at the same dosage.   4.  We will recommend follow for a DEXA scan in 2 years for osteoporosis. He understands the risk of osteoporotic fractures  5.  The patient has been strongly emphasized to abstain from smoking and alcohol completely  again.  We have reemphasized cumulative effect of smoking and alcohol on chronic pancreatitis, which could include further progression of his disease, and smoking also accentuates the risk for pancreatic cancer in chronic pancreatitis.     6. A followup visit will be scheduled in 6 months from now.       A total of 20 minutes was spent with more than 50% of the time in counseling, especially alcohol and smoking cessation and discussing the cumulative effects of smoking and alcohol on the pancreas and also for coordinating further management plans.  The patient and his wife verbalized their understanding.           Again, thank you for allowing me to participate in the care of your patient.      Sincerely,    Guru Ericka MD

## 2017-11-16 NOTE — MR AVS SNAPSHOT
After Visit Summary   11/16/2017    Ravindra Fregoso    MRN: [id number]           Patient Information     Date Of Birth          1960        Visit Information        Provider Department      11/16/2017 11:00 AM Guru Doron Barnett MD [facility] Pancreas and Biliary        Today's Diagnoses     Special screening for malignant neoplasms, colon    -  1      Care Instructions    1.  6 month follow up.  Please call [phone] to schedule.      Follow up:  As above.    Please call with any questions or concerns regarding your clinic visit today.    It is a pleasure being involved in your health care.    Contacts post-consultation depending on your need:    Schedule Clinic Appointments       [phone] # 1   M-F 7:30 - 5 pm    Clarissa VERMA RN Coordinator           [phone] # 3   M-F 8:00 - 5 pm  (Triage hours)     Hakeem HARTMANN RN Coordinator               [phone] # 3   M-F 8:00 - 5 pm  (Triage hours)    OR Procedure Scheduling              [phone]    My Chart is available 24 hours a day and is a secure way to access your records and communicate with your care team.  I strongly recommend signing up if you haven't already done so, if you are comfortable with computers.  If you would like to inquire about this or are having problems with My Chart access, you may call [phone] or go online at [email or web address].  Please allow at least 24 hours for a response and extra time on weekends and Holidays.              Follow-ups after your visit        Your next 10 appointments already scheduled     Dec 11, 2017  7:00 AM CST   Lab with  LAB   [facility] ([facility])    [address] [place] [address] [phone]            Dec 11, 2017  7:30 AM CST   US ABDOMEN COMPLETE with [address] [place] [address] Imaging Center US ([facility])    [address] [place] [address]    [phone]           Please bring a list of your medicines (including vitamins, minerals and over-the-counter drugs). Also, tell your doctor about any allergies you may have. Wear comfortable clothes and leave your valuables at home.  Adults: No eating or drinking for 8 hours before the exam. You may take medicine with a small sip of water.  Children: - Children 6+ years: No food or drink for 6 hours before exam. - Children 1-5 years: No food or drink for 4 hours before exam. - Infants, breast-fed: may have breast milk up to 2 hours before exam. - Infants, formula: may have bottle until 4 hours before exam.  Please call the Imaging Department at your exam site with any questions.            Dec 11, 2017  8:40 AM CST   (Arrive by 8:25 AM)   Return General Liver with Rich Wong MD   [facility] Hepatology ([facility])    [address] [place] [address] [phone]              Who to contact     Please call your clinic at [phone] to:    Ask questions about your health    Make or cancel appointments    Discuss your medicines    Learn about your test results    Speak to your doctor   If you have compliments or concerns about an experience at your clinic, or if you wish to file a complaint, please contact [facility] Patient Relations at [phone] or email us at [email or web address]         Additional Information About Your Visit        [employer] Information     [employer] gives you secure access to your electronic health record. If you see a primary care provider, you can also send messages to your care team and make appointments. If you have questions, please call your primary care clinic.  If you do not have a primary care provider, please call [phone] and they will assist you.      [employer] is an electronic gateway that provides easy, online access to your medical records. With [employer], you can request a  "clinic appointment, read your test results, renew a prescription or communicate with your care team.     To access your existing account, please contact your [facility] or call [phone] for assistance.        Care EveryWhere ID     This is your Care EveryWhere ID. This could be used by other organizations to access your [place] medical records  [id number]        Your Vitals Were     Pulse Temperature Height Pulse Oximetry BMI (Body Mass Index)       82 98.7  F (37.1  C) (Oral) 1.626 m (5' 4.02\") 97% 27.69 kg/m2        Blood Pressure from Last 3 Encounters:   11/16/17 146/82   06/15/17 (!) 153/91   06/12/17 171/84    Weight from Last 3 Encounters:   11/16/17 73.2 kg (161 lb 6.4 oz)   06/15/17 70 kg (154 lb 6.4 oz)   06/12/17 69.2 kg (152 lb 9.6 oz)              Today, you had the following     No orders found for display         Today's Medication Changes          These changes are accurate as of: 11/16/17 11:45 AM.  If you have any questions, ask your nurse or doctor.               These medicines have changed or have updated prescriptions.        Dose/Directions    insulin glargine 100 UNIT/ML injection   Commonly known as:  LANTUS   This may have changed:  how much to take   Used for:  Diabetes mellitus without complication (H)        Dose:  22 Units   Inject 22 Units Subcutaneous At Bedtime   Quantity:  3 mL   Refills:  3                Primary Care Provider Office Phone # Fax #    Moo HODGES Mark [phone]       [facility] [address]   [facility] [address]        Equal Access to Services     GRADY NEWTON AH: Hadii aad ku hadashsyd Sojanine, waaxda luqadaha, qaybta kaalmada min brito. So [facility] [phone].    ATENCIÓN: Si habla español, tiene a albarado disposición servicios gratuitos de asistencia lingüística. Llame al [phone].    We comply with applicable federal civil rights laws and [place] laws. We do not " discriminate on the basis of race, color, national origin, age, disability, sex, sexual orientation, or gender identity.            Thank you!     Thank you for choosing [facility] PANCREAS AND BILIARY  for your care. Our goal is always to provide you with excellent care. Hearing back from our patients is one way we can continue to improve our services. Please take a few minutes to complete the written survey that you may receive in the mail after your visit with us. Thank you!             Your Updated Medication List - Protect others around you: Learn how to safely use, store and throw away your medicines at www.disposemymeds.org.          This list is accurate as of: 11/16/17 11:45 AM.  Always use your most recent med list.                   Brand Name Dispense Instructions for use Diagnosis    aMILoride 5 MG tablet    MIDAMOR    120 tablet    Take 2 tablets (10 mg) by mouth daily    Alcoholic cirrhosis of liver with ascites (H)       amylase-lipase-protease 52909-81334 UNITS Cpep per EC capsule    CREON 24    450 capsule    Take 1 capsule (24,000 Units) by mouth 3 times daily (with meals) Take 2-3 with meals, and 1-2 with snacks. Not to exceed 15/day    Pancreatic insufficiency       blood glucose lancets standard    no brand specified    1 Box    Use to test blood sugar 4 times daily or as directed.    Diabetes mellitus without complication (H)       blood glucose monitoring test strip    ACCU-CHEK ROBERTO    100 strip    Use to test blood sugars 4 times daily or as directed.    Diabetes mellitus without complication (H)       bumetanide 1 MG tablet    BUMEX    90 tablet    Take 1 tablet (1 mg) by mouth daily    Alcoholic cirrhosis of liver with ascites (H)       FLUoxetine 20 MG tablet      Take 20 mg by mouth daily        FOSAMAX 70 MG tablet   Generic drug:  alendronate      Take 70 mg by mouth every 7 days        HumaLOG KWIKpen 100 UNIT/ML injection   Generic drug:  insulin lispro      Inject Subcutaneous 3  times daily (before meals) sliding scale        insulin glargine 100 UNIT/ML injection    LANTUS    3 mL    Inject 22 Units Subcutaneous At Bedtime    Diabetes mellitus without complication (H)       lactulose 10 GM/15ML solution    CHRONULAC     Take 20 g by mouth 2 times daily        MULTIVITAL Tabs     30 tablet    Take 1 tablet by mouth        omeprazole 20 MG CR capsule    priLOSEC    90 capsule    Take 1 capsule (20 mg) by mouth daily    Gastroesophageal reflux disease, esophagitis presence not specified, Alcoholic cirrhosis of liver with ascites (H)       potassium chloride SA 20 MEQ CR tablet    K-DUR/KLOR-CON M    60 tablet    Take 1 tablet (20 mEq) by mouth 2 times daily    Alcoholic cirrhosis of liver with ascites (H)       rifaximin 550 MG Tabs tablet    XIFAXAN    60 tablet    Take 1 tablet (550 mg) by mouth 2 times daily    Hepatic encephalopathy (H)       Sharps Container Misc     1 each    1 Units 4 times daily as needed    Diabetes mellitus without complication (H)

## 2017-11-16 NOTE — PATIENT INSTRUCTIONS
1.  Please follow up in 6 months.  Please call [phone] to schedule.      Follow up:  As above.    Please call with any questions or concerns regarding your clinic visit today.    It is a pleasure being involved in your health care.    Contacts post-consultation depending on your need:    Schedule Clinic Appointments       [phone] # 1   M-F 7:30 - 5 pm    Clarissa VERMA RN Coordinator           [phone] # 3   M-F 8:00 - 5 pm  (Triage hours)     Hakeem HARTMANN RN Coordinator               [phone] # 3   M-F 8:00 - 5 pm  (Triage hours)    OR Procedure Scheduling              [phone]    My Chart is available 24 hours a day and is a secure way to access your records and communicate with your care team.  I strongly recommend signing up if you haven't already done so, if you are comfortable with computers.  If you would like to inquire about this or are having problems with My Chart access, you may call [phone] or go online at [email or web address].  Please allow at least 24 hours for a response and extra time on weekends and Holidays.

## 2017-12-11 ENCOUNTER — OFFICE VISIT (OUTPATIENT)
Dept: GASTROENTEROLOGY | Facility: CLINIC | Age: 57
End: 2017-12-11
Attending: INTERNAL MEDICINE
Payer: MEDICARE

## 2017-12-11 VITALS
BODY MASS INDEX: 27.14 KG/M2 | SYSTOLIC BLOOD PRESSURE: 148 MMHG | DIASTOLIC BLOOD PRESSURE: 83 MMHG | WEIGHT: 159 LBS | TEMPERATURE: 98.1 F | HEIGHT: 64 IN | HEART RATE: 70 BPM

## 2017-12-11 DIAGNOSIS — K70.31 ALCOHOLIC CIRRHOSIS OF LIVER WITH ASCITES (H): Primary | ICD-10-CM

## 2017-12-11 DIAGNOSIS — K70.31 ALCOHOLIC CIRRHOSIS OF LIVER WITH ASCITES (H): ICD-10-CM

## 2017-12-11 LAB
ALBUMIN SERPL-MCNC: 3.1 G/DL (ref 3.4–5)
ALP SERPL-CCNC: 121 U/L (ref 40–150)
ALT SERPL W P-5'-P-CCNC: 31 U/L (ref 0–70)
ANION GAP SERPL CALCULATED.3IONS-SCNC: 6 MMOL/L (ref 3–14)
AST SERPL W P-5'-P-CCNC: 38 U/L (ref 0–45)
BILIRUB DIRECT SERPL-MCNC: 0.2 MG/DL (ref 0–0.2)
BILIRUB SERPL-MCNC: 0.7 MG/DL (ref 0.2–1.3)
BUN SERPL-MCNC: 19 MG/DL (ref 7–30)
CALCIUM SERPL-MCNC: 8.4 MG/DL (ref 8.5–10.1)
CHLORIDE SERPL-SCNC: 108 MMOL/L (ref 94–109)
CO2 SERPL-SCNC: 28 MMOL/L (ref 20–32)
CREAT SERPL-MCNC: 0.99 MG/DL (ref 0.66–1.25)
ERYTHROCYTE [DISTWIDTH] IN BLOOD BY AUTOMATED COUNT: 14 % (ref 10–15)
GFR SERPL CREATININE-BSD FRML MDRD: 77 ML/MIN/1.7M2
GLUCOSE SERPL-MCNC: 112 MG/DL (ref 70–99)
HCT VFR BLD AUTO: 39.4 % (ref 40–53)
HGB BLD-MCNC: 13.4 G/DL (ref 13.3–17.7)
INR PPP: 1.17 (ref 0.86–1.14)
MCH RBC QN AUTO: 34.6 PG (ref 26.5–33)
MCHC RBC AUTO-ENTMCNC: 34 G/DL (ref 31.5–36.5)
MCV RBC AUTO: 102 FL (ref 78–100)
PLATELET # BLD AUTO: 125 10E9/L (ref 150–450)
POTASSIUM SERPL-SCNC: 4.9 MMOL/L (ref 3.4–5.3)
PROT SERPL-MCNC: 7.2 G/DL (ref 6.8–8.8)
RBC # BLD AUTO: 3.87 10E12/L (ref 4.4–5.9)
SODIUM SERPL-SCNC: 142 MMOL/L (ref 133–144)
WBC # BLD AUTO: 7.1 10E9/L (ref 4–11)

## 2017-12-11 PROCEDURE — 36415 COLL VENOUS BLD VENIPUNCTURE: CPT | Performed by: INTERNAL MEDICINE

## 2017-12-11 PROCEDURE — 85027 COMPLETE CBC AUTOMATED: CPT | Performed by: INTERNAL MEDICINE

## 2017-12-11 PROCEDURE — 99213 OFFICE O/P EST LOW 20 MIN: CPT | Mod: ZF

## 2017-12-11 PROCEDURE — 85610 PROTHROMBIN TIME: CPT | Performed by: INTERNAL MEDICINE

## 2017-12-11 PROCEDURE — 80076 HEPATIC FUNCTION PANEL: CPT | Performed by: INTERNAL MEDICINE

## 2017-12-11 PROCEDURE — 80048 BASIC METABOLIC PNL TOTAL CA: CPT | Performed by: INTERNAL MEDICINE

## 2017-12-11 ASSESSMENT — PAIN SCALES - GENERAL: PAINLEVEL: NO PAIN (0)

## 2017-12-11 NOTE — LETTER
12/11/2017        RE: Ravindra Fregoso  [address]  [facility] [address]     Dear Colleague,    Thank you for referring your patient, Ravindra Fregoso, to the [facility] HEPATOLOGY at [facility]. Please see a copy of my visit note below.    [facility]    Hepatology follow-up    Follow-up visit for cirrhosis    Subjective:  57 year old male    Cirrhosis  - ETOH  - last ETOH ~2 months ago  - hx ascites, on Bumex and triamterene (gynecomastia on spironolactone)  - hx HE, on rifaximin   - no hx variceal bleed  - last EGD Dec 2016- gd I varices, portal HTN gastropathy  - HCC screening- Abd U/S Dec 2017     The patient comes to clinic this morning with his wife for follow-up of alcoholic cirrhosis.  Last clinic visit in 06/2017.  Since then, the patient saw GI (panc-bili) for follow-up of a pancreatic fluid collection and chronic pancreatitis.  No new medications, ER visits or hospital admissions since last clinic visit.  The patient had his influenza vaccination earlier this winter.      Patient is doing well.  He has gained 7 pounds of weight since his last clinic visit which his wife attributes to increased caloric intake in the form of desserts.      The patient feels some abdominal distention.  An ultrasound shows no evidence of intraabdominal fluid.  No evidence of lower extremity edema.  The patient is less than adherent to a low-salt diet.  He takes his diuretics regularly.      The patient denies jaundice, lethargy or confusion.      No history of melena, hematemesis or hematochezia.      The patient denies any fevers, sweats or chills.      The patient continues to drink alcohol.  He last drank alcohol yesterday.  He is drinking on average 2 vodka drinks per week.  The patient continues to smoke.  The patient and his wife are considering looking at cruises to [place] in the new year.      Medical hx Surgical hx   Past Medical History:    Diagnosis Date     Cirrhosis of liver (H)      Depression      Osteoporosis      Polyps, colonic       Past Surgical History:   Procedure Laterality Date     ESOPHAGOSCOPY, GASTROSCOPY, DUODENOSCOPY (EGD), COMBINED  11/2/2012    Procedure: COMBINED ESOPHAGOSCOPY, GASTROSCOPY, DUODENOSCOPY (EGD);;  Surgeon: Darell Stewart MD;  [facility]     ESOPHAGOSCOPY, GASTROSCOPY, DUODENOSCOPY (EGD), COMBINED  12/6/2013    Procedure: COMBINED ESOPHAGOSCOPY, GASTROSCOPY, DUODENOSCOPY (EGD);;  Surgeon: Darell Stewart MD;  [facility]: [facility]     ESOPHAGOSCOPY, GASTROSCOPY, DUODENOSCOPY (EGD), COMBINED N/A 12/17/2014    Procedure: COMBINED ESOPHAGOSCOPY, GASTROSCOPY, DUODENOSCOPY (EGD), BIOPSY SINGLE OR MULTIPLE;  Surgeon: Philippe Zhu MD;  Location: [facility]     ESOPHAGOSCOPY, GASTROSCOPY, DUODENOSCOPY (EGD), COMBINED N/A 12/22/2016    Procedure: COMBINED ESOPHAGOSCOPY, GASTROSCOPY, DUODENOSCOPY (EGD);  Surgeon: Rich Haque MD;  Location: [facility]     HC UGI ENDOSCOPY W EUS Left 5/4/2015    Procedure: COMBINED ENDOSCOPIC ULTRASOUND, ESOPHAGOSCOPY, GASTROSCOPY, DUODENOSCOPY (EGD);  Surgeon: Tremaine Sanches MD;  Location: [facility]     HERNIORRHAPHY INGUINAL      bilateral repair     ORTHOPEDIC SURGERY      fx left leg, left hip      ORTHOPEDIC SURGERY      fx right ankle and right arm          Medications  Prior to Admission medications    Medication Sig Start Date End Date Taking? Authorizing Provider   potassium chloride SA (K-DUR/KLOR-CON M) 20 MEQ CR tablet Take 1 tablet (20 mEq) by mouth 2 times daily 10/16/17  Yes Rich Haque MD   bumetanide (BUMEX) 1 MG tablet Take 1 tablet (1 mg) by mouth daily 10/6/17  Yes Rich Haque MD   omeprazole (PRILOSEC) 20 MG CR capsule Take 1 capsule (20 mg) by mouth daily 9/29/17  Yes Rich Haque MD   amylase-lipase-protease (CREON 24) 73516 UNITS CPEP per EC capsule Take 1 capsule (24,000 Units) by mouth 3 times  "daily (with meals) Take 2-3 with meals, and 1-2 with snacks. Not to exceed 15/day 6/16/17  Yes Guru Doron Barnett MD   insulin lispro (HUMALOG KWIKPEN) 100 UNIT/ML injection Inject Subcutaneous 3 times daily (before meals) sliding scale   Yes Reported, Patient   FLUoxetine 20 MG tablet Take 20 mg by mouth daily   Yes Reported, Patient   aMILoride (MIDAMOR) 5 MG tablet Take 2 tablets (10 mg) by mouth daily 6/12/17  Yes Rich Haque MD   insulin glargine (LANTUS) 100 UNIT/ML injection Inject 22 Units Subcutaneous At Bedtime  Patient taking differently: Inject 20 Units Subcutaneous At Bedtime  4/23/17  Yes Natanael Rios MD   blood glucose (NO BRAND SPECIFIED) lancets standard Use to test blood sugar 4 times daily or as directed. 4/23/17  Yes Natanael Rios MD   blood glucose monitoring (ACCU-CHEK ROBERTO) test strip Use to test blood sugars 4 times daily or as directed. 4/23/17  Yes Natanael Rios MD   Sharps Container MISC 1 Units 4 times daily as needed 4/23/17  Yes Natanael Rios MD   rifaximin (XIFAXAN) 550 MG TABS tablet Take 1 tablet (550 mg) by mouth 2 times daily 2/23/17  Yes Mini Carey MD   lactulose (CHRONULAC) 10 GM/15ML solution Take 20 g by mouth 2 times daily   Yes Unknown, Entered By History   alendronate (FOSAMAX) 70 MG tablet Take 70 mg by mouth every 7 days   Yes Reported, Patient   Multiple Vitamins-Minerals (MULTIVITAL) TABS Take 1 tablet by mouth 11/19/15  Yes Rich Haque MD       Allergies  Allergies   Allergen Reactions     Atorvastatin Calcium      No Clinical Screening - See Comments      Platelets ( infusion)       Review of systems  A 10-point review of systems was negative    Examination  /83  Pulse 70  Temp 98.1  F (36.7  C) (Oral)  Ht 1.626 m (5' 4\")  Wt 72.1 kg (159 lb)  BMI 27.29 kg/m2  Body mass index is 27.29 kg/(m^2).    Gen- well, NAD, A+Ox3, normal color  Lym- no palpable LAD  CVS- RRR  RS- CTA  Abd- " overweight, soft, non-tender, no a  Extr- hands normal, no JOHN  Skin- no rash or jaundice  Neuro- no asterixis  Psych- normal mood    Laboratory  Lab Results   Component Value Date     12/11/2017    POTASSIUM 4.9 12/11/2017    CHLORIDE 108 12/11/2017    CO2 28 12/11/2017    BUN 19 12/11/2017    CR 0.99 12/11/2017       Lab Results   Component Value Date    BILITOTAL 0.7 12/11/2017    ALT 31 12/11/2017    AST 38 12/11/2017    ALKPHOS 121 12/11/2017       Lab Results   Component Value Date    ALBUMIN 3.1 12/11/2017    PROTTOTAL 7.2 12/11/2017        Lab Results   Component Value Date    WBC 7.1 12/11/2017    HGB 13.4 12/11/2017     12/11/2017     12/11/2017       Lab Results   Component Value Date    INR 1.17 12/11/2017       Radiology  Abd U/S Dec 2017 reviewed    Assessment  57-year-old male who presents for routine follow-up of decompensated alcoholic cirrhosis complicated with history of ascites and hepatic encephalopathy.  MELD-Na= 8(improved).  Last ETOH= 12/10/2017.  Ascites and hepatic encephalopathy are well-controlled on medical regimen.  Weight gain is related to increased caloric intake in the absence of any intra-abdominal fluid on ultrasound.  Up-to-date with HCC screening.  Up-to-date with surveillance of esophageal varices.      We discussed the importance of alcohol cessation with regards to prevention of progression of liver disease in addition to the importance of smoking cessation with regards to general health, development of pancreatic cancer (in chronic pancreatitis) and other malignancies.     Plan  1.  Complete abstinence from alcohol  2.  Low salt diet  3.  Continue bumetanide and amiloride  4.  Continue lactulose and rifaximin  5.  Smoking cessation  6.  Follow-up in 6 months    Rich Little MD  Hepatology  [facility]    Again, thank you for allowing me to participate in the care of your patient.      Sincerely,    Rich Little MD

## 2017-12-11 NOTE — PROGRESS NOTES
[facility]    Hepatology follow-up    Follow-up visit for cirrhosis    Subjective:  57 year old male    Cirrhosis  - ETOH  - last ETOH ~2 months ago  - hx ascites, on Bumex and triamterene (gynecomastia on spironolactone)  - hx HE, on rifaximin   - no hx variceal bleed  - last EGD Dec 2016- gd I varices, portal HTN gastropathy  - HCC screening- Abd U/S Dec 2017     The patient comes to clinic this morning with his wife for follow-up of alcoholic cirrhosis.  Last clinic visit in 06/2017.  Since then, the patient saw GI (panc-bili) for follow-up of a pancreatic fluid collection and chronic pancreatitis.  No new medications, ER visits or hospital admissions since last clinic visit.  The patient had his influenza vaccination earlier this winter.      Patient is doing well.  He has gained 7 pounds of weight since his last clinic visit which his wife attributes to increased caloric intake in the form of desserts.      The patient feels some abdominal distention.  An ultrasound shows no evidence of intraabdominal fluid.  No evidence of lower extremity edema.  The patient is less than adherent to a low-salt diet.  He takes his diuretics regularly.      The patient denies jaundice, lethargy or confusion.      No history of melena, hematemesis or hematochezia.      The patient denies any fevers, sweats or chills.      The patient continues to drink alcohol.  He last drank alcohol yesterday.  He is drinking on average 2 vodka drinks per week.  The patient continues to smoke.  The patient and his wife are considering looking at cruises to [place] in the new year.      Medical hx Surgical hx   Past Medical History:   Diagnosis Date     Cirrhosis of liver (H)      Depression      Osteoporosis      Polyps, colonic       Past Surgical History:   Procedure Laterality Date     ESOPHAGOSCOPY, GASTROSCOPY, DUODENOSCOPY (EGD), COMBINED  11/2/2012    Procedure: COMBINED ESOPHAGOSCOPY, GASTROSCOPY, DUODENOSCOPY  (EGD);;  Surgeon: Darell Stewart MD;  Location: [facility]     ESOPHAGOSCOPY, GASTROSCOPY, DUODENOSCOPY (EGD), COMBINED  12/6/2013    Procedure: COMBINED ESOPHAGOSCOPY, GASTROSCOPY, DUODENOSCOPY (EGD);;  Surgeon: Darell Stewart MD;  [facility]: [facility]     ESOPHAGOSCOPY, GASTROSCOPY, DUODENOSCOPY (EGD), COMBINED N/A 12/17/2014    Procedure: COMBINED ESOPHAGOSCOPY, GASTROSCOPY, DUODENOSCOPY (EGD), BIOPSY SINGLE OR MULTIPLE;  Surgeon: Philippe Zhu MD;  [facility]: [facility]     ESOPHAGOSCOPY, GASTROSCOPY, DUODENOSCOPY (EGD), COMBINED N/A 12/22/2016    Procedure: COMBINED ESOPHAGOSCOPY, GASTROSCOPY, DUODENOSCOPY (EGD);  Surgeon: Rich Haque MD;  Location:  GI     HC UGI ENDOSCOPY W EUS Left 5/4/2015    Procedure: COMBINED ENDOSCOPIC ULTRASOUND, ESOPHAGOSCOPY, GASTROSCOPY, DUODENOSCOPY (EGD);  Surgeon: Tremaine Sanches MD;  Location:  [facility]     HERNIORRHAPHY INGUINAL      bilateral repair     ORTHOPEDIC SURGERY      fx left leg, left hip      ORTHOPEDIC SURGERY      fx right ankle and right arm          Medications  Prior to Admission medications    Medication Sig Start Date End Date Taking? Authorizing Provider   potassium chloride SA (K-DUR/KLOR-CON M) 20 MEQ CR tablet Take 1 tablet (20 mEq) by mouth 2 times daily 10/16/17  Yes Rich Haque MD   bumetanide (BUMEX) 1 MG tablet Take 1 tablet (1 mg) by mouth daily 10/6/17  Yes Rich Haque MD   omeprazole (PRILOSEC) 20 MG CR capsule Take 1 capsule (20 mg) by mouth daily 9/29/17  Yes Rich Haque MD   amylase-lipase-protease (CREON 24) 18483 UNITS CPEP per EC capsule Take 1 capsule (24,000 Units) by mouth 3 times daily (with meals) Take 2-3 with meals, and 1-2 with snacks. Not to exceed 15/day 6/16/17  Yes Guru Doron Barnett MD   insulin lispro (HUMALOG KWIKPEN) 100 UNIT/ML injection Inject Subcutaneous 3 times daily (before meals) sliding scale   Yes Reported, Patient   FLUoxetine 20  "MG tablet Take 20 mg by mouth daily   Yes Reported, Patient   aMILoride (MIDAMOR) 5 MG tablet Take 2 tablets (10 mg) by mouth daily 6/12/17  Yes Rich Haque MD   insulin glargine (LANTUS) 100 UNIT/ML injection Inject 22 Units Subcutaneous At Bedtime  Patient taking differently: Inject 20 Units Subcutaneous At Bedtime  4/23/17  Yes Natanael Rios MD   blood glucose (NO BRAND SPECIFIED) lancets standard Use to test blood sugar 4 times daily or as directed. 4/23/17  Yes Natanael Rios MD   blood glucose monitoring (ACCU-CHEK ROBERTO) test strip Use to test blood sugars 4 times daily or as directed. 4/23/17  Yes Natanael Rios MD   Sharps Container MISC 1 Units 4 times daily as needed 4/23/17  Yes Natanael Rios MD   rifaximin (XIFAXAN) 550 MG TABS tablet Take 1 tablet (550 mg) by mouth 2 times daily 2/23/17  Yes Mini Carey MD   lactulose (CHRONULAC) 10 GM/15ML solution Take 20 g by mouth 2 times daily   Yes Unknown, Entered By History   alendronate (FOSAMAX) 70 MG tablet Take 70 mg by mouth every 7 days   Yes Reported, Patient   Multiple Vitamins-Minerals (MULTIVITAL) TABS Take 1 tablet by mouth 11/19/15  Yes Rich Haque MD       Allergies  Allergies   Allergen Reactions     Atorvastatin Calcium      No Clinical Screening - See Comments      Platelets ( infusion)       Review of systems  A 10-point review of systems was negative    Examination  /83  Pulse 70  Temp 98.1  F (36.7  C) (Oral)  Ht 1.626 m (5' 4\")  Wt 72.1 kg (159 lb)  BMI 27.29 kg/m2  Body mass index is 27.29 kg/(m^2).    Gen- well, NAD, A+Ox3, normal color  Lym- no palpable LAD  CVS- RRR  RS- CTA  Abd- overweight, soft, non-tender, no a  Extr- hands normal, no JOHN  Skin- no rash or jaundice  Neuro- no asterixis  Psych- normal mood    Laboratory  Lab Results   Component Value Date     12/11/2017    POTASSIUM 4.9 12/11/2017    CHLORIDE 108 12/11/2017    CO2 28 12/11/2017    BUN 19 12/11/2017 "    CR 0.99 12/11/2017       Lab Results   Component Value Date    BILITOTAL 0.7 12/11/2017    ALT 31 12/11/2017    AST 38 12/11/2017    ALKPHOS 121 12/11/2017       Lab Results   Component Value Date    ALBUMIN 3.1 12/11/2017    PROTTOTAL 7.2 12/11/2017        Lab Results   Component Value Date    WBC 7.1 12/11/2017    HGB 13.4 12/11/2017     12/11/2017     12/11/2017       Lab Results   Component Value Date    INR 1.17 12/11/2017       Radiology  Abd U/S Dec 2017 reviewed    Assessment  57-year-old male who presents for routine follow-up of decompensated alcoholic cirrhosis complicated with history of ascites and hepatic encephalopathy.  MELD-Na= 8(improved).  Last ETOH= 12/10/2017.  Ascites and hepatic encephalopathy are well-controlled on medical regimen.  Weight gain is related to increased caloric intake in the absence of any intra-abdominal fluid on ultrasound.  Up-to-date with HCC screening.  Up-to-date with surveillance of esophageal varices.      We discussed the importance of alcohol cessation with regards to prevention of progression of liver disease in addition to the importance of smoking cessation with regards to general health, development of pancreatic cancer (in chronic pancreatitis) and other malignancies.     Plan  1.  Complete abstinence from alcohol  2.  Low salt diet  3.  Continue bumetanide and amiloride  4.  Continue lactulose and rifaximin  5.  Smoking cessation  6.  Follow-up in 6 months    Rich Little MD  Hepatology  [facility]

## 2017-12-11 NOTE — NURSING NOTE
"Chief Complaint   Patient presents with     RECHECK     follow up with cirrhosis, [facility]immer [facility]       Initial /83  Pulse 70  Temp 98.1  F (36.7  C) (Oral)  Ht 1.626 m (5' 4\")  Wt 72.1 kg (159 lb)  BMI 27.29 kg/m2 Estimated body mass index is 27.29 kg/(m^2) as calculated from the following:    Height as of this encounter: 1.626 m (5' 4\").    Weight as of this encounter: 72.1 kg (159 lb).  Medication Reconciliation: complete    "

## 2017-12-11 NOTE — LETTER
12/11/2017       RE: Ravindra Fregoso  [address]  [facility] [address]     Dear Colleague,    Thank you for referring your patient, Ravindra Fregoso, to the [facility] HEPATOLOGY at [facility]. Please see a copy of my visit note below.    [facility]    Hepatology follow-up    Follow-up visit for cirrhosis    Subjective:  57 year old male    Cirrhosis  - ETOH  - last ETOH ~2 months ago  - hx ascites, on Bumex and triamterene (gynecomastia on spironolactone)  - hx HE, on rifaximin   - no hx variceal bleed  - last EGD Dec 2016- gd I varices, portal HTN gastropathy  - HCC screening- Abd U/S Dec 2017     The patient comes to clinic this morning with his wife for follow-up of alcoholic cirrhosis.  Last clinic visit in 06/2017.  Since then, the patient saw GI (panc-bili) for follow-up of a pancreatic fluid collection and chronic pancreatitis.  No new medications, ER visits or hospital admissions since last clinic visit.  The patient had his influenza vaccination earlier this winter.      Patient is doing well.  He has gained 7 pounds of weight since his last clinic visit which his wife attributes to increased caloric intake in the form of desserts.      The patient feels some abdominal distention.  An ultrasound shows no evidence of intraabdominal fluid.  No evidence of lower extremity edema.  The patient is less than adherent to a low-salt diet.  He takes his diuretics regularly.      The patient denies jaundice, lethargy or confusion.      No history of melena, hematemesis or hematochezia.      The patient denies any fevers, sweats or chills.      The patient continues to drink alcohol.  He last drank alcohol yesterday.  He is drinking on average 2 vodka drinks per week.  The patient continues to smoke.  The patient and his wife are considering looking at cruises to [place] in the new year.      Medical hx Surgical hx   Past Medical History:    Diagnosis Date     Cirrhosis of liver (H)      Depression      Osteoporosis      Polyps, colonic       Past Surgical History:   Procedure Laterality Date     ESOPHAGOSCOPY, GASTROSCOPY, DUODENOSCOPY (EGD), COMBINED  11/2/2012    Procedure: COMBINED ESOPHAGOSCOPY, GASTROSCOPY, DUODENOSCOPY (EGD);;  Surgeon: Darell Stewart MD;  Location: [facility]     ESOPHAGOSCOPY, GASTROSCOPY, DUODENOSCOPY (EGD), COMBINED  12/6/2013    Procedure: COMBINED ESOPHAGOSCOPY, GASTROSCOPY, DUODENOSCOPY (EGD);;  Surgeon: Darell Stewart MD;  Location: [facility]     ESOPHAGOSCOPY, GASTROSCOPY, DUODENOSCOPY (EGD), COMBINED N/A 12/17/2014    Procedure: COMBINED ESOPHAGOSCOPY, GASTROSCOPY, DUODENOSCOPY (EGD), BIOPSY SINGLE OR MULTIPLE;  Surgeon: Philippe Zhu MD;  [facility]: [facility]     ESOPHAGOSCOPY, GASTROSCOPY, DUODENOSCOPY (EGD), COMBINED N/A 12/22/2016    Procedure: COMBINED ESOPHAGOSCOPY, GASTROSCOPY, DUODENOSCOPY (EGD);  Surgeon: Rich Haque MD;  Location: [facility]     HC UGI ENDOSCOPY W EUS Left 5/4/2015    Procedure: COMBINED ENDOSCOPIC ULTRASOUND, ESOPHAGOSCOPY, GASTROSCOPY, DUODENOSCOPY (EGD);  Surgeon: Tremaine Sanches MD;  Location: [facility]     HERNIORRHAPHY INGUINAL      bilateral repair     ORTHOPEDIC SURGERY      fx left leg, left hip      ORTHOPEDIC SURGERY      fx right ankle and right arm          Medications  Prior to Admission medications    Medication Sig Start Date End Date Taking? Authorizing Provider   potassium chloride SA (K-DUR/KLOR-CON M) 20 MEQ CR tablet Take 1 tablet (20 mEq) by mouth 2 times daily 10/16/17  Yes Rich Haque MD   bumetanide (BUMEX) 1 MG tablet Take 1 tablet (1 mg) by mouth daily 10/6/17  Yes Rich Haque MD   omeprazole (PRILOSEC) 20 MG CR capsule Take 1 capsule (20 mg) by mouth daily 9/29/17  Yes Rich Haque MD   amylase-lipase-protease (CREON 24) 58830 UNITS CPEP per EC capsule Take 1 capsule (24,000 Units) by mouth 3 times  "daily (with meals) Take 2-3 with meals, and 1-2 with snacks. Not to exceed 15/day 6/16/17  Yes Guru Doron Barnett MD   insulin lispro (HUMALOG KWIKPEN) 100 UNIT/ML injection Inject Subcutaneous 3 times daily (before meals) sliding scale   Yes Reported, Patient   FLUoxetine 20 MG tablet Take 20 mg by mouth daily   Yes Reported, Patient   aMILoride (MIDAMOR) 5 MG tablet Take 2 tablets (10 mg) by mouth daily 6/12/17  Yes Rich Haque MD   insulin glargine (LANTUS) 100 UNIT/ML injection Inject 22 Units Subcutaneous At Bedtime  Patient taking differently: Inject 20 Units Subcutaneous At Bedtime  4/23/17  Yes Natanael Rios MD   blood glucose (NO BRAND SPECIFIED) lancets standard Use to test blood sugar 4 times daily or as directed. 4/23/17  Yes Natanael Rios MD   blood glucose monitoring (ACCU-CHEK ROBERTO) test strip Use to test blood sugars 4 times daily or as directed. 4/23/17  Yes Natanael Rios MD   Sharps Container MISC 1 Units 4 times daily as needed 4/23/17  Yes Natanael Rios MD   rifaximin (XIFAXAN) 550 MG TABS tablet Take 1 tablet (550 mg) by mouth 2 times daily 2/23/17  Yes Mini Carey MD   lactulose (CHRONULAC) 10 GM/15ML solution Take 20 g by mouth 2 times daily   Yes Unknown, Entered By History   alendronate (FOSAMAX) 70 MG tablet Take 70 mg by mouth every 7 days   Yes Reported, Patient   Multiple Vitamins-Minerals (MULTIVITAL) TABS Take 1 tablet by mouth 11/19/15  Yes Rich Haque MD       Allergies  Allergies   Allergen Reactions     Atorvastatin Calcium      No Clinical Screening - See Comments      Platelets ( infusion)       Review of systems  A 10-point review of systems was negative    Examination  /83  Pulse 70  Temp 98.1  F (36.7  C) (Oral)  Ht 1.626 m (5' 4\")  Wt 72.1 kg (159 lb)  BMI 27.29 kg/m2  Body mass index is 27.29 kg/(m^2).    Gen- well, NAD, A+Ox3, normal color  Lym- no palpable LAD  CVS- RRR  RS- CTA  Abd- " overweight, soft, non-tender, no a  Extr- hands normal, no JOHN  Skin- no rash or jaundice  Neuro- no asterixis  Psych- normal mood    Laboratory  Lab Results   Component Value Date     12/11/2017    POTASSIUM 4.9 12/11/2017    CHLORIDE 108 12/11/2017    CO2 28 12/11/2017    BUN 19 12/11/2017    CR 0.99 12/11/2017       Lab Results   Component Value Date    BILITOTAL 0.7 12/11/2017    ALT 31 12/11/2017    AST 38 12/11/2017    ALKPHOS 121 12/11/2017       Lab Results   Component Value Date    ALBUMIN 3.1 12/11/2017    PROTTOTAL 7.2 12/11/2017        Lab Results   Component Value Date    WBC 7.1 12/11/2017    HGB 13.4 12/11/2017     12/11/2017     12/11/2017       Lab Results   Component Value Date    INR 1.17 12/11/2017       Radiology  Abd U/S Dec 2017 reviewed    Assessment  57-year-old male who presents for routine follow-up of decompensated alcoholic cirrhosis complicated with history of ascites and hepatic encephalopathy.  MELD-Na= 8(improved).  Last ETOH= 12/10/2017.  Ascites and hepatic encephalopathy are well-controlled on medical regimen.  Weight gain is related to increased caloric intake in the absence of any intra-abdominal fluid on ultrasound.  Up-to-date with HCC screening.  Up-to-date with surveillance of esophageal varices.      We discussed the importance of alcohol cessation with regards to prevention of progression of liver disease in addition to the importance of smoking cessation with regards to general health, development of pancreatic cancer (in chronic pancreatitis) and other malignancies.     Plan  1.  Complete abstinence from alcohol  2.  Low salt diet  3.  Continue bumetanide and amiloride  4.  Continue lactulose and rifaximin  5.  Smoking cessation  6.  Follow-up in 6 months    Rich Little MD  Hepatology  [facility]    Again, thank you for allowing me to participate in the care of your patient.      Sincerely,    Rich Little MD

## 2017-12-11 NOTE — MR AVS SNAPSHOT
After Visit Summary   12/11/2017    Ravindra Fregoso    MRN: [id number]           Patient Information     Date Of Birth          1960        Visit Information        Provider Department      12/11/2017 8:40 AM Rich Haque MD [facility]        Today's Diagnoses     Alcoholic cirrhosis of liver with ascites (H)    -  1       Follow-ups after your visit        Follow-up notes from your care team     Return in about 6 months (around 6/11/2018).      Your next 10 appointments already scheduled     Jun 18, 2018  8:15 AM CDT   US ABDOMEN COMPLETE with UCUS2   [facility] US ([facility])    [address] [address] [phone]           Please bring a list of your medicines (including vitamins, minerals and over-the-counter drugs). Also, tell your doctor about any allergies you may have. Wear comfortable clothes and leave your valuables at home.  Adults: No eating or drinking for 8 hours before the exam. You may take medicine with a small sip of water.  Children: - Children 6+ years: No food or drink for 6 hours before exam. - Children 1-5 years: No food or drink for 4 hours before exam. - Infants, breast-fed: may have breast milk up to 2 hours before exam. - Infants, formula: may have bottle until 4 hours before exam.  Please call the Imaging Department at your exam site with any questions.            Jun 18, 2018  9:00 AM CDT   (Arrive by 8:45 AM)   Return General Liver with Rich Wong MD   [facility] ([facility])    [address] [place] [address] [phone]              Future tests that were ordered for you today     Open Future Orders        Priority Expected Expires Ordered    US Abdomen Complete Routine 6/11/2018 12/11/2018 12/11/2017            Who to contact     If you have questions or need follow up information about  "today's clinic visit or your schedule please contact [facility] HEPAT[facility] directly at [phone].  Normal or non-critical lab and imaging results will be communicated to you by PerMicrohart, letter or phone within 4 business days after the clinic has received the results. If you do not hear from us within 7 days, please contact the clinic through 20:20 Mobilet or phone. If you have a critical or abnormal lab result, we will notify you by phone as soon as possible.  Submit refill requests through [email or web address] or call your pharmacy and they will forward the refill request to us. Please allow 3 business days for your refill to be completed.          Additional Information About Your Visit        PerMicroharScheduling Employee Scheduling Software Information     [email or web address] gives you secure access to your electronic health record. If you see a primary care provider, you can also send messages to your care team and make appointments. If you have questions, please call your primary care clinic.  If you do not have a primary care provider, please call [phone] and they will assist you.        Care EveryWhere ID     This is your Care EveryWhere ID. This could be used by other organizations to access your [facility] medical records  [id number]        Your Vitals Were     Pulse Temperature Height BMI (Body Mass Index)          70 98.1  F (36.7  C) (Oral) 1.626 m (5' 4\") 27.29 kg/m2         Blood Pressure from Last 3 Encounters:   12/11/17 148/83   11/16/17 146/82   06/15/17 (!) 153/91    Weight from Last 3 Encounters:   12/11/17 72.1 kg (159 lb)   11/16/17 73.2 kg (161 lb 6.4 oz)   06/15/17 70 kg (154 lb 6.4 oz)                 Today's Medication Changes          These changes are accurate as of: 12/11/17  9:02 AM.  If you have any questions, ask your nurse or doctor.               These medicines have changed or have updated prescriptions.        Dose/Directions    insulin glargine 100 UNIT/ML injection   Commonly known as:  LANTUS   This may have changed:  how much to take   Used " for:  Diabetes mellitus without complication (H)        Dose:  22 Units   Inject 22 Units Subcutaneous At Bedtime   Quantity:  3 mL   Refills:  3                Primary Care Provider Office Phone # Fax #    Moo Flores [phone]       [facility] [address]   [facility] [address]        Equal Access to Services     LANA NEWTON : Hadii aad ku hadasho Soomaali, waaxda luqadaha, qaybta kaalmada adeegyada, waxneo jenniferin leeannn ademarta stovall labebetohugo . So [facility] [place] [phone].    ATENCIÓN: Si habla español, tiene a albarado disposición servicios gratuitos de asistencia lingüística. Maddy al [phone].    We comply with applicable federal civil rights laws and [place] laws. We do not discriminate on the basis of race, color, national origin, age, disability, sex, sexual orientation, or gender identity.            Thank you!     Thank you for choosing [facility]PAT[facility]  for your care. Our goal is always to provide you with excellent care. Hearing back from our patients is one way we can continue to improve our services. Please take a few minutes to complete the written survey that you may receive in the mail after your visit with us. Thank you!             Your Updated Medication List - Protect others around you: Learn how to safely use, store and throw away your medicines at www.disposemymeds.org.          This list is accurate as of: 12/11/17  9:02 AM.  Always use your most recent med list.                   Brand Name Dispense Instructions for use Diagnosis    aMILoride 5 MG tablet    MIDAMOR    120 tablet    Take 2 tablets (10 mg) by mouth daily    Alcoholic cirrhosis of liver with ascites (H)       amylase-lipase-protease 39679-52727 UNITS Cpep per EC capsule    CREON 24    450 capsule    Take 1 capsule (24,000 Units) by mouth 3 times daily (with meals) Take 2-3 with meals, and 1-2 with snacks. Not to exceed 15/day    Pancreatic insufficiency       blood glucose lancets standard    no brand  specified    1 Box    Use to test blood sugar 4 times daily or as directed.    Diabetes mellitus without complication (H)       blood glucose monitoring test strip    ACCU-CHEK ROBERTO    100 strip    Use to test blood sugars 4 times daily or as directed.    Diabetes mellitus without complication (H)       bumetanide 1 MG tablet    BUMEX    90 tablet    Take 1 tablet (1 mg) by mouth daily    Alcoholic cirrhosis of liver with ascites (H)       FLUoxetine 20 MG tablet      Take 20 mg by mouth daily        FOSAMAX 70 MG tablet   Generic drug:  alendronate      Take 70 mg by mouth every 7 days        HumaLOG KWIKpen 100 UNIT/ML injection   Generic drug:  insulin lispro      Inject Subcutaneous 3 times daily (before meals) sliding scale        insulin glargine 100 UNIT/ML injection    LANTUS    3 mL    Inject 22 Units Subcutaneous At Bedtime    Diabetes mellitus without complication (H)       lactulose 10 GM/15ML solution    CHRONULAC     Take 20 g by mouth 2 times daily        MULTIVITAL Tabs     30 tablet    Take 1 tablet by mouth        omeprazole 20 MG CR capsule    priLOSEC    90 capsule    Take 1 capsule (20 mg) by mouth daily    Gastroesophageal reflux disease, esophagitis presence not specified, Alcoholic cirrhosis of liver with ascites (H)       potassium chloride SA 20 MEQ CR tablet    K-DUR/KLOR-CON M    60 tablet    Take 1 tablet (20 mEq) by mouth 2 times daily    Alcoholic cirrhosis of liver with ascites (H)       rifaximin 550 MG Tabs tablet    XIFAXAN    60 tablet    Take 1 tablet (550 mg) by mouth 2 times daily    Hepatic encephalopathy (H)       Sharps Container Misc     1 each    1 Units 4 times daily as needed    Diabetes mellitus without complication (H)

## 2018-01-30 DIAGNOSIS — K76.82 HEPATIC ENCEPHALOPATHY (H): ICD-10-CM

## 2018-03-12 ENCOUNTER — MYC MEDICAL ADVICE (OUTPATIENT)
Dept: GASTROENTEROLOGY | Facility: CLINIC | Age: 58
End: 2018-03-12

## 2018-03-12 DIAGNOSIS — K70.31 ALCOHOLIC CIRRHOSIS OF LIVER WITH ASCITES (H): ICD-10-CM

## 2018-03-12 DIAGNOSIS — K21.9 GASTROESOPHAGEAL REFLUX DISEASE, ESOPHAGITIS PRESENCE NOT SPECIFIED: ICD-10-CM

## 2018-03-12 RX ORDER — BUMETANIDE 1 MG/1
1 TABLET ORAL DAILY
Qty: 90 TABLET | Refills: 1 | Status: SHIPPED | OUTPATIENT
Start: 2018-03-12

## 2018-04-03 DIAGNOSIS — K21.9 GASTROESOPHAGEAL REFLUX DISEASE, ESOPHAGITIS PRESENCE NOT SPECIFIED: ICD-10-CM

## 2018-04-03 DIAGNOSIS — K70.31 ALCOHOLIC CIRRHOSIS OF LIVER WITH ASCITES (H): ICD-10-CM

## 2018-06-20 DIAGNOSIS — K70.31 ALCOHOLIC CIRRHOSIS OF LIVER WITH ASCITES (H): ICD-10-CM

## 2018-06-20 RX ORDER — POTASSIUM CHLORIDE 1500 MG/1
20 TABLET, EXTENDED RELEASE ORAL 2 TIMES DAILY
Qty: 60 TABLET | Refills: 3 | Status: SHIPPED | OUTPATIENT
Start: 2018-06-20

## 2018-08-30 NOTE — LETTER
"It was so nice to speak with you on Tuesday, April 25th.  I hope I was able to give you some useful information during our Medication Therapy Management (MTM) visit. The purpose of this visit with a clinical pharmacist was to review the medicines you received when discharged from the hospital. We want to make sure that you know which medicines to take and what they are for. We also want to make sure all your medicines are working, safe, and as easy to take as possible.    Enclosed is a summary of the suggestions we talked about and any other thoughts I had. There is also a list of your medicines included. This information has also been shared with your primary care provider.    Feel free to call if you have any questions or concerns. By working together with you and your doctor, I hope to help you feel confident managing your medicines and improving your quality of life.    Next MTM visit: I will call you again to follow up on Tuesday, May 9th, at 12:00 pm. This will appear as a \"telemed\" appointment. Please do not come to this appointment.          Best wishes,         Kalina Cornell, [facility] Pharmacist.   [phone]        "
TOKJOSEPH:'[id number]',TOKEN:'[id number]'

## 2018-12-04 DIAGNOSIS — K76.82 HEPATIC ENCEPHALOPATHY (H): ICD-10-CM

## 2019-04-15 NOTE — PROGRESS NOTES
REFERRING PHYSICIAN:  Moo Flores MD      REASON FOR CONSULTATION:  Chronic calcific pancreatitis with focal walled off necrotic collection.      CHIEF COMPLAINT:  Tiredness.      HISTORY OF PRESENT ILLNESS:    This is a 57-year-old white male who is accompanied by his wife with longstanding history of decompensated alcoholic cirrhosis and chronic calcific chronic pancreatitis secondary to alcohol abuse .  He was initially followed by Dr. Jacobo Delaney, currently followed by Dr. Rich Little, with complications of ascites, esophageal varices and hepatic encephalopathy who is here for a followup for chronic calcific pancreatitis secondary to alcohol abuse.  Of note, the patient has established his care in the [facility] with me in 06/2017, and he was seen prior to that in 01/2017 with new onset diabetes, dizziness and fatigue, and there was a hypodense lesion in the pancreatic body and uncinate process which appeared more like focal walled off necrosis with extensive pancreatic calcifications, atrophy and ductal dilation.  He was subsequently scheduled for endoscopic ultrasound, but since he was asymptomatic and after further reviewing, it was established to be focal walled off necrosis and we held off on it.Also as a part of his workup, he has undergone endoscopic ultrasound by Dr. Sanches which showed only grade 1 esophageal varices, portal hypertensive gastropathy and EUS features consistent with chronic pancreatitis with markedly dilated main pancreatic duct and left single obstructing stone in the main pancreatic duct to the major papilla.        He was then seen in clinic and was started on pancreatic enzymes, and his nutrition panel showed deficiency of vitamin A and zinc, which were all supplemented.  He subsequently followed today in clinic.  Since last clinic visit, the patient continues to deny abdominal pain, nausea or vomiting.  He denies dysphagia or odynophagia.  Denies melena,  hematochezia and hematemesis.  He, however, reports poor appetite and also extreme fatigue which is similar to his previous clinic visit.  Of note, he continues to drink alcohol and is being followed by Dr. Rich Little for decompensated cirrhosis.  He is currently not listed for transplantation because of his alcohol habits, and he also continues to smoke for a long time.      PAST MEDICAL HISTORY:     1.  Alcohol-related cirrhosis complicated by ascites, portal hypertension, esophageal varices and hepatic encephalopathy.   2.  Depression.   3.  Osteoporosis.   4.  Colonic polyps.      PAST SURGICAL HISTORY:  Orthopedic surgery, inguinal hernia surgery, endoscopy and endoscopic ultrasound.      FAMILY HISTORY:  No history of pancreatitis.  No history of pancreatic cancer.  No history of liver disease.  No history of bladder cancer.      SOCIAL HISTORY:  Smokes about 1-5 packs of cigarettes per week.  He used to smoke 2-3 packs per day for the past 45 years.  He drinks a couple of drinks of vodka a weeks but used to drink heavily in the past.  He has 3 kids and lives with his wife.      ALLERGIES:  Atorvastatin.      MEDICATIONS:  See Epic.      PHYSICAL EXAMINATION:   GENERAL:  He is not in acute distress.   HEAD AND NECK:  No pallor, no icterus.  Pupils are equally reacting to light.   CHEST:  Lungs clear to auscultation.   CARDIOVASCULAR:  S1, S2 heard.  Systolic murmur is audible.   ABDOMEN:  Soft, nontender, no umbilical hernia present.  Spleen was palpable.   NEUROLOGIC:  Alert, awake, oriented to time, place, person, and no asterixis.   EXTREMITIES:  No pedal edema.   SKIN:   spider nevi in the chest.      LABORATORY DATA:  The following are her most recent labs:  Creatinine 0.8 and GFR of more than 90.      IMAGING DATA:  CT scan today shows unchanged dilation of the main pancreatic duct measuring up to 10 mm, coarse calcifications throughout the head, body and tail of the pancreas consistent with chronic  pancreatitis, grossly unchanged 4.7 x 3.7 cm hypodense lesion in the head of the pancreas which is likely walled off necrosis.      ASSESSMENT AND PLAN:  This is a 57-year-old gentleman with a history of decompensated alcohol-induced cirrhosis, currently not listed for transplant because of ACTIVE ALCOHOL ABUSE  complicated by ascites, esophageal varices, portal hypertensive gastropathy, followed by Dr. Rich Little from a liver standpoint, chronic calcific pancreatitis complicated by a possible focal walled off necrosis in the head, currently asymptomatic from pancreas standpoint.  He is here for a followup visit.    His main complaint today is fatigue and was enquiring about paracentesis.     RECOMMENDATIONS:     We reviewed his CT scan findings.  We explained to him that the size of the collection appears to be stable in size, and it is sterile in nature  since he is clinically asymptomatic (no significant abdominal pain, fevers, chills or signs of gastric outlet obstruction), we will hold off on any pancreatic endotherapy.  We did explain to him that if he has a significant amount of pain, we would consider endoscopic transluminal drainage followed by ERCP with plans to do pancreatic endotherapy.  We will defer this to Dr. Rich Little.  We did explain to the patient that he does not have significant ascites today on the exam to warrant a paracentesis.       1. His fatigue is possibly due to his decompensated cirrhosis. We will defer further management of his liver disease and its complications to Dr. Rich Little whom he is scheduled to meet in near future.   2.  We will recommend colonoscopy for followup for polyps because he is due now, but the patient has adamantly refused saying that he has a hard time with his prep, and we will continue to recommend colonoscopies.   3.  We will recommend continuing pancreatic enzymes, Creon at the same dosage.   4.  We will recommend follow for a DEXA scan in 2 years for  osteoporosis. He understands the risk of osteoporotic fractures  5.  The patient has been strongly emphasized to abstain from smoking and alcohol completely  again.  We have reemphasized cumulative effect of smoking and alcohol on chronic pancreatitis, which could include further progression of his disease, and smoking also accentuates the risk for pancreatic cancer in chronic pancreatitis.     6. A followup visit will be scheduled in 6 months from now.       A total of 20 minutes was spent with more than 50% of the time in counseling, especially alcohol and smoking cessation and discussing the cumulative effects of smoking and alcohol on the pancreas and also for coordinating further management plans.  The patient and his wife verbalized their understanding.          60 bpm

## 2019-04-23 ENCOUNTER — MYC MEDICAL ADVICE (OUTPATIENT)
Dept: GASTROENTEROLOGY | Facility: CLINIC | Age: 59
End: 2019-04-23

## 2019-04-23 DIAGNOSIS — K76.82 HEPATIC ENCEPHALOPATHY (H): ICD-10-CM

## 2019-11-02 DIAGNOSIS — K76.82 HEPATIC ENCEPHALOPATHY (H): ICD-10-CM

## 2019-11-04 RX ORDER — RIFAXIMIN 550 MG/1
TABLET ORAL
Qty: 180 TABLET | Refills: 11 | Status: SHIPPED | OUTPATIENT
Start: 2019-11-04 | End: 2020-12-31

## 2019-11-06 NOTE — NURSING NOTE
"Chief Complaint   Patient presents with     Consult     New Consult-Chronic Panc       Vitals:    06/15/17 0843   BP: (!) 153/91   BP Location: Left arm   Patient Position: Chair   Cuff Size: Adult Regular   Pulse: 70   Temp: 98.3  F (36.8  C)   SpO2: 95%   Weight: 154 lb 6.4 oz   Height: 5' 4.02\"       Body mass index is 26.49 kg/(m^2).      Mary Kapoor                          "
(2) more than 100 beats/min

## 2020-02-17 ENCOUNTER — DOCUMENTATION ONLY (OUTPATIENT)
Dept: CARE COORDINATION | Facility: CLINIC | Age: 60
End: 2020-02-17

## 2020-02-18 DIAGNOSIS — K70.31 ALCOHOLIC CIRRHOSIS OF LIVER WITH ASCITES (H): Primary | ICD-10-CM

## 2020-03-01 ENCOUNTER — HEALTH MAINTENANCE LETTER (OUTPATIENT)
Age: 60
End: 2020-03-01

## 2020-03-03 ENCOUNTER — TELEPHONE (OUTPATIENT)
Dept: GASTROENTEROLOGY | Facility: CLINIC | Age: 60
End: 2020-03-03

## 2020-03-04 ENCOUNTER — OFFICE VISIT (OUTPATIENT)
Dept: GASTROENTEROLOGY | Facility: CLINIC | Age: 60
End: 2020-03-04
Attending: INTERNAL MEDICINE
Payer: MEDICARE

## 2020-03-04 ENCOUNTER — ANCILLARY PROCEDURE (OUTPATIENT)
Dept: ULTRASOUND IMAGING | Facility: CLINIC | Age: 60
End: 2020-03-04
Attending: INTERNAL MEDICINE
Payer: COMMERCIAL

## 2020-03-04 ENCOUNTER — TELEPHONE (OUTPATIENT)
Dept: GASTROENTEROLOGY | Facility: CLINIC | Age: 60
End: 2020-03-04

## 2020-03-04 VITALS
DIASTOLIC BLOOD PRESSURE: 84 MMHG | BODY MASS INDEX: 27.33 KG/M2 | OXYGEN SATURATION: 97 % | HEART RATE: 70 BPM | TEMPERATURE: 98 F | WEIGHT: 159.2 LBS | SYSTOLIC BLOOD PRESSURE: 136 MMHG

## 2020-03-04 DIAGNOSIS — K70.31 ALCOHOLIC CIRRHOSIS OF LIVER WITH ASCITES (H): Primary | ICD-10-CM

## 2020-03-04 DIAGNOSIS — K70.31 ALCOHOLIC CIRRHOSIS OF LIVER WITH ASCITES (H): ICD-10-CM

## 2020-03-04 LAB
ALBUMIN SERPL-MCNC: 2.4 G/DL (ref 3.4–5)
ALP SERPL-CCNC: 118 U/L (ref 40–150)
ALT SERPL W P-5'-P-CCNC: 38 U/L (ref 0–70)
ANION GAP SERPL CALCULATED.3IONS-SCNC: 4 MMOL/L (ref 3–14)
AST SERPL W P-5'-P-CCNC: 36 U/L (ref 0–45)
BILIRUB DIRECT SERPL-MCNC: 0.4 MG/DL (ref 0–0.2)
BILIRUB SERPL-MCNC: 1.2 MG/DL (ref 0.2–1.3)
BUN SERPL-MCNC: 13 MG/DL (ref 7–30)
CALCIUM SERPL-MCNC: 8.3 MG/DL (ref 8.5–10.1)
CHLORIDE SERPL-SCNC: 108 MMOL/L (ref 94–109)
CO2 SERPL-SCNC: 28 MMOL/L (ref 20–32)
CREAT SERPL-MCNC: 0.9 MG/DL (ref 0.66–1.25)
ERYTHROCYTE [DISTWIDTH] IN BLOOD BY AUTOMATED COUNT: 15.4 % (ref 10–15)
GFR SERPL CREATININE-BSD FRML MDRD: >90 ML/MIN/{1.73_M2}
GLUCOSE SERPL-MCNC: 228 MG/DL (ref 70–99)
HCT VFR BLD AUTO: 38.6 % (ref 40–53)
HGB BLD-MCNC: 13.2 G/DL (ref 13.3–17.7)
INR PPP: 1.27 (ref 0.86–1.14)
MCH RBC QN AUTO: 33.4 PG (ref 26.5–33)
MCHC RBC AUTO-ENTMCNC: 34.2 G/DL (ref 31.5–36.5)
MCV RBC AUTO: 98 FL (ref 78–100)
PLATELET # BLD AUTO: 117 10E9/L (ref 150–450)
POTASSIUM SERPL-SCNC: 3.8 MMOL/L (ref 3.4–5.3)
PROT SERPL-MCNC: 6.6 G/DL (ref 6.8–8.8)
RBC # BLD AUTO: 3.95 10E12/L (ref 4.4–5.9)
SODIUM SERPL-SCNC: 140 MMOL/L (ref 133–144)
WBC # BLD AUTO: 6.6 10E9/L (ref 4–11)

## 2020-03-04 PROCEDURE — 85610 PROTHROMBIN TIME: CPT | Performed by: INTERNAL MEDICINE

## 2020-03-04 PROCEDURE — 80048 BASIC METABOLIC PNL TOTAL CA: CPT | Performed by: INTERNAL MEDICINE

## 2020-03-04 PROCEDURE — 85027 COMPLETE CBC AUTOMATED: CPT | Performed by: INTERNAL MEDICINE

## 2020-03-04 PROCEDURE — 80076 HEPATIC FUNCTION PANEL: CPT | Performed by: INTERNAL MEDICINE

## 2020-03-04 PROCEDURE — 36415 COLL VENOUS BLD VENIPUNCTURE: CPT | Performed by: INTERNAL MEDICINE

## 2020-03-04 PROCEDURE — G0463 HOSPITAL OUTPT CLINIC VISIT: HCPCS | Mod: ZF

## 2020-03-04 RX ORDER — FLUOXETINE 40 MG/1
40 CAPSULE ORAL DAILY
COMMUNITY
Start: 2020-01-22

## 2020-03-04 ASSESSMENT — PAIN SCALES - GENERAL: PAINLEVEL: NO PAIN (0)

## 2020-03-04 NOTE — NURSING NOTE
Chief Complaint   Patient presents with     RECHECK     ETOH Cirrhosis           /84 (BP Location: Right arm, Patient Position: Sitting, Cuff Size: Adult Regular)   Pulse 70   Temp 98  F (36.7  C) (Oral)   Wt 72.2 kg (159 lb 3.2 oz)   SpO2 97%   BMI 27.33 kg/m              Belen David CMA    3/4/2020 10:24 AM

## 2020-03-04 NOTE — LETTER
3/4/2020      RE: Ravindra Fregoso  [address] [facility] [address]       [facility]    Hepatology follow-up    Follow-up visit for cirrhosis    Subjective:  59 year old male    Cirrhosis  - ETOH  - last ETOH ~2 months ago  - hx ascites, on Bumex and triamterene (gynecomastia on spironolactone)  - hx HE, on rifaximin   - no hx variceal bleed  - last EGD Dec 2016- gd I varices, portal HTN gastropathy  - HCC screening- Abd U/S 3/4/2020     The patient comes to clinic this morning with his wife for follow-up of cirrhosis.  Last clinic visit 12/2017.  The patient was on a course of amoxicillin in 12/2019 after a root canal.  He denies any ER visits or hospital admissions since last clinic visit.      The patient is doing okay today.  He notes some mild dyspnea on exertion.  He denies any chest pain, palpitations or syncope.  The patient denies any symptoms specific to liver disease.      The patient denies jaundice, lower extremity edema, abdominal distention, lethargy or confusion.      The patient denies melena, hematemesis or hematochezia.      No history of fevers, sweats or chills.  The patient had an influenza vaccination earlier this winter.      Weight has been stable.  Appetite is good.      The patient continues to have issues with his glycemic control.  His blood sugars average at least 200.      The patient last drank alcohol 2 weeks ago when he was in [place].  He reports that he has significantly reduced his alcohol use in 2.25 years since he was last in clinic.  Prior to his trip to [facility], he had not drank alcohol for several months.       Medical hx Surgical hx   Past Medical History:   Diagnosis Date     Cirrhosis of liver (H)     ETOH     Depression      Osteoporosis      Polyps, colonic       Past Surgical History:   Procedure Laterality Date     ESOPHAGOSCOPY, GASTROSCOPY, DUODENOSCOPY (EGD), COMBINED  11/2/2012    Procedure: COMBINED ESOPHAGOSCOPY, GASTROSCOPY,  DUODENOSCOPY (EGD);;  Surgeon: Darell Stewart MD;  Location: [facility]     ESOPHAGOSCOPY, GASTROSCOPY, DUODENOSCOPY (EGD), COMBINED  12/6/2013    Procedure: COMBINED ESOPHAGOSCOPY, GASTROSCOPY, DUODENOSCOPY (EGD);;  Surgeon: Darell Stewart MD;  [facility]     ESOPHAGOSCOPY, GASTROSCOPY, DUODENOSCOPY (EGD), COMBINED N/A 12/17/2014    Procedure: COMBINED ESOPHAGOSCOPY, GASTROSCOPY, DUODENOSCOPY (EGD), BIOPSY SINGLE OR MULTIPLE;  Surgeon: Philippe Zhu MD;  Location: [facility]     ESOPHAGOSCOPY, GASTROSCOPY, DUODENOSCOPY (EGD), COMBINED N/A 12/22/2016    Procedure: COMBINED ESOPHAGOSCOPY, GASTROSCOPY, DUODENOSCOPY (EGD);  Surgeon: Rich Haque MD;  Location:  GI     [facility] ENDOSCOPY W EUS Left 5/4/2015    Procedure: COMBINED ENDOSCOPIC ULTRASOUND, ESOPHAGOSCOPY, GASTROSCOPY, DUODENOSCOPY (EGD);  Surgeon: Tremaine Sanches MD;  Location:  GI     HERNIORRHAPHY INGUINAL      bilateral repair     ORTHOPEDIC SURGERY      fx left leg, left hip      ORTHOPEDIC SURGERY      fx right ankle and right arm          Medications  Prior to Admission medications    Medication Sig Start Date End Date Taking? Authorizing Provider   alendronate (FOSAMAX) 70 MG tablet Take 70 mg by mouth every 7 days   Yes Reported, Patient   aMILoride (MIDAMOR) 5 MG tablet Take 2 tablets (10 mg) by mouth daily  Patient taking differently: Take 5 mg by mouth 2 times daily  6/12/17  Yes Rich Haque MD   blood glucose (NO BRAND SPECIFIED) lancets standard Use to test blood sugar 4 times daily or as directed. 4/23/17  Yes Natanael Rios MD   blood glucose monitoring (ACCU-CHEK ROBERTO) test strip Use to test blood sugars 4 times daily or as directed. 4/23/17  Yes Natanael Rios MD   bumetanide (BUMEX) 1 MG tablet Take 1 tablet (1 mg) by mouth daily 3/12/18  Yes Rich Haque MD   FLUoxetine (PROZAC) 40 MG capsule Take 40 mg by mouth daily 1/22/20  Yes Reported, Patient   insulin  glargine (LANTUS) 100 UNIT/ML injection Inject 22 Units Subcutaneous At Bedtime  Patient taking differently: Inject 26 Units Subcutaneous At Bedtime  4/23/17  Yes Natanael Rios MD   insulin lispro (HUMALOG KWIKPEN) 100 UNIT/ML injection Inject Subcutaneous 3 times daily (before meals) sliding scale   Yes Reported, Patient   Multiple Vitamins-Minerals (MULTIVITAL) TABS Take 1 tablet by mouth 11/19/15  Yes Rich Haque MD   omeprazole (PRILOSEC) 20 MG CR capsule Take 1 capsule (20 mg) by mouth daily 4/3/18  Yes Rich Haque MD   potassium chloride SA (K-DUR/KLOR-CON M) 20 MEQ CR tablet Take 1 tablet (20 mEq) by mouth 2 times daily  Patient taking differently: Take 20 mEq by mouth daily  6/20/18  Yes Rich Haque MD   Sharps Container MISC 1 Units 4 times daily as needed 4/23/17  Yes Natanael Rios MD   XIFAXAN 550 MG TABS tablet TAKE 1 TABLET BY MOUTH TWICE DAILY. 11/4/19  Yes Rich Haque MD   amylase-lipase-protease (CREON 24) 81671 UNITS CPEP per EC capsule Take 1 capsule (24,000 Units) by mouth 3 times daily (with meals) Take 2-3 with meals, and 1-2 with snacks. Not to exceed 15/day 6/16/17   Guru Doron Barnett MD   FLUoxetine 20 MG tablet Take 20 mg by mouth daily    Reported, Patient   lactulose (CHRONULAC) 10 GM/15ML solution Take 20 g by mouth 2 times daily    Unknown, Entered By History       Allergies  Allergies   Allergen Reactions     Atorvastatin Calcium      No Clinical Screening - See Comments      Platelets ( infusion)       Review of systems  A 10-point review of systems was negative    Examination  /84 (BP Location: Right arm, Patient Position: Sitting, Cuff Size: Adult Regular)   Pulse 70   Temp 98  F (36.7  C) (Oral)   Wt 72.2 kg (159 lb 3.2 oz)   SpO2 97%   BMI 27.33 kg/m     Body mass index is 27.33 kg/m .    Gen- well, NAD, A+Ox3, normal color  CVS- end systolic murmur with radiation to carotids, no added  sounds, RRR  RS- CTA  Abd- central obesity, soft, non-tender, palpable hepatomegaly, no ascites or splenomegaly on palpation or percussion, BS+  Extr- hands normal, no JOHN  Skin- telangiectasia on chest wall, no jaundice  Neuro- no asterixis  Psych- normal mood    Laboratory  Lab Results   Component Value Date     03/04/2020    POTASSIUM 3.8 03/04/2020    CHLORIDE 108 03/04/2020    CO2 28 03/04/2020    BUN 13 03/04/2020    CR 0.90 03/04/2020       Lab Results   Component Value Date    BILITOTAL 1.2 03/04/2020    ALT 38 03/04/2020    AST 36 03/04/2020    ALKPHOS 118 03/04/2020       Lab Results   Component Value Date    ALBUMIN 2.4 03/04/2020    PROTTOTAL 6.6 03/04/2020        Lab Results   Component Value Date    WBC 6.6 03/04/2020    HGB 13.2 03/04/2020    MCV 98 03/04/2020     03/04/2020       Lab Results   Component Value Date    INR 1.27 03/04/2020       Radiology  Abd U/S 3/4/2020 reviewed    Assessment  59-year-old male who presents for follow-up of alcoholic cirrhosis complicated with history of ascites and hepatic encephalopathy.  MELD-Na= 10 (stable).  Last ETOH= 02/2020.  No evidence of ascites on current diuretics.  Hepatic encephalopathy well-controlled on rifaximin alone.  Would recommend obtaining echocardiogram to evaluate for aortic stenosis in context of heart murmur and dyspnea on exertion.  Up to date with HCC screening.  Overdue for surveillance of esophageal varices.     Plan  1.  Complete abstinence from alcohol  2.  Low Na diet  3.  Continue amiloride and bumetanide at current doses  4.  EGD  5.  Follow-up in 6 months    Rich Little MD  Hepatology  [facility]    Rich Little MD

## 2020-08-27 DIAGNOSIS — K70.31 ALCOHOLIC CIRRHOSIS OF LIVER WITH ASCITES (H): Primary | ICD-10-CM

## 2020-08-28 ENCOUNTER — TELEPHONE (OUTPATIENT)
Dept: GASTROENTEROLOGY | Facility: CLINIC | Age: 60
End: 2020-08-28

## 2020-08-28 NOTE — TELEPHONE ENCOUNTER
Called patient to let him know his in person visit scheduled with Dr. Little scheduled on 8/31 will be changed to a virtual visit due do COVID-19.     Patient verbalized understanding.  Patient will have labs and US done locally and orders will be faxed to [facility].      Fabiana PANG LPN  Hepatology Clinic

## 2020-08-31 ENCOUNTER — VIRTUAL VISIT (OUTPATIENT)
Dept: GASTROENTEROLOGY | Facility: CLINIC | Age: 60
End: 2020-08-31
Attending: INTERNAL MEDICINE
Payer: COMMERCIAL

## 2020-08-31 DIAGNOSIS — K70.31 ALCOHOLIC CIRRHOSIS OF LIVER WITH ASCITES (H): Primary | ICD-10-CM

## 2020-08-31 ASSESSMENT — PAIN SCALES - GENERAL: PAINLEVEL: NO PAIN (0)

## 2020-08-31 NOTE — LETTER
"    8/31/2020         RE: Ravindra Fregoso  [address] [facility] [address]        Dear Colleague,    Thank you for referring your patient, Ravindra Fregoso, to the [facility]. Please see a copy of my visit note below.    Ravindra Fregoso is a 60 year old male who is being evaluated via a billable telephone visit.      The patient has been notified of following:     \"This telephone visit will be conducted via a call between you and your physician/provider. We have found that certain health care needs can be provided without the need for a physical exam.  This service lets us provide the care you need with a short phone conversation.  If a prescription is necessary we can send it directly to your pharmacy.  If lab work is needed we can place an order for that and you can then stop by our lab to have the test done at a later time.    Telephone visits are billed at different rates depending on your insurance coverage. During this emergency period, for some insurers they may be billed the same as an in-person visit.  Please reach out to your insurance provider with any questions.    If during the course of the call the physician/provider feels a telephone visit is not appropriate, you will not be charged for this service.\"    Patient has given verbal consent for Telephone visit?  Yes    What phone number would you like to be contacted at? [phone]    How would you like to obtain your AVS? Mail a copy    Phone call duration: 11 minutes  _____________________________________________________________________________    [facility]    Hepatology follow-up    Follow-up visit for cirrhosis    Subjective:  60 year old male    Cirrhosis  - ETOH  - last ETOH ~2 months ago  - hx ascites, on Bumex and amiloride (gynecomastia on spironolactone)  - hx HE, on rifaximin   - no hx variceal bleed  - last EGD Dec 2016- gd I varices, portal HTN gastropathy  - HCC screening- Abd U/S 3/4/2020 " "    Last clinic visit March 2020.  No new medications, ER visits or hospital admissions since last clinic visit.    Patient is well today.  He denies any symptoms related to his liver disease.  He continues to get mild dyspnea on exertion.  No chest pain, palpitations or presyncope.    Patient denies jaundice, lower extremity edema, abdominal distension, lethargy or confusion.    Patient denies melena, hematemesis or hematochezia.    Patient denies fevers, sweats or chills.  No cough or dyspnea.    Weight stable.  Appetite is good.    Patient continues to smoke.  He does not remember exactly when he last drank alcohol- \"months ago.\"  He occasionally wears a mask when out in public but is practicing social distancing.      Medical hx Surgical hx   Past Medical History:   Diagnosis Date     Cirrhosis of liver (H)     ETOH     Depression      Osteoporosis      Polyps, colonic       Past Surgical History:   Procedure Laterality Date     ESOPHAGOSCOPY, GASTROSCOPY, DUODENOSCOPY (EGD), COMBINED  11/2/2012    Procedure: COMBINED ESOPHAGOSCOPY, GASTROSCOPY, DUODENOSCOPY (EGD);;  Surgeon: Darell Stewart MD;  Location: [facility]     ESOPHAGOSCOPY, GASTROSCOPY, DUODENOSCOPY (EGD), COMBINED  12/6/2013    Procedure: COMBINED ESOPHAGOSCOPY, GASTROSCOPY, DUODENOSCOPY (EGD);;  Surgeon: Darell Stewart MD;  Location: [facility]     ESOPHAGOSCOPY, GASTROSCOPY, DUODENOSCOPY (EGD), COMBINED N/A 12/17/2014    Procedure: COMBINED ESOPHAGOSCOPY, GASTROSCOPY, DUODENOSCOPY (EGD), BIOPSY SINGLE OR MULTIPLE;  Surgeon: Philippe Zhu MD;  Location:  GI     ESOPHAGOSCOPY, GASTROSCOPY, DUODENOSCOPY (EGD), COMBINED N/A 12/22/2016    Procedure: COMBINED ESOPHAGOSCOPY, GASTROSCOPY, DUODENOSCOPY (EGD);  Surgeon: Rich Haque MD;  Location: [facility]     HC UGI ENDOSCOPY W EUS Left 5/4/2015    Procedure: COMBINED ENDOSCOPIC ULTRASOUND, ESOPHAGOSCOPY, GASTROSCOPY, DUODENOSCOPY (EGD);  Surgeon: Tremaine Sanches MD;  " Location: [facility]     HERNIORRHAPHY INGUINAL      bilateral repair     ORTHOPEDIC SURGERY      fx left leg, left hip      ORTHOPEDIC SURGERY      fx right ankle and right arm          Medications  Prior to Admission medications    Medication Sig Start Date End Date Taking? Authorizing Provider   alendronate (FOSAMAX) 70 MG tablet Take 70 mg by mouth every 7 days   Yes Reported, Patient   aMILoride (MIDAMOR) 5 MG tablet Take 2 tablets (10 mg) by mouth daily  Patient taking differently: Take 5 mg by mouth 2 times daily  6/12/17  Yes Rich Haque MD   blood glucose (NO BRAND SPECIFIED) lancets standard Use to test blood sugar 4 times daily or as directed. 4/23/17  Yes Natanael Rios MD   blood glucose monitoring (ACCU-CHEK ROBERTO) test strip Use to test blood sugars 4 times daily or as directed. 4/23/17  Yes Natanael Rios MD   bumetanide (BUMEX) 1 MG tablet Take 1 tablet (1 mg) by mouth daily 3/12/18  Yes Rich Haque MD   FLUoxetine (PROZAC) 40 MG capsule Take 40 mg by mouth daily 1/22/20  Yes Reported, Patient   insulin glargine (LANTUS) 100 UNIT/ML injection Inject 22 Units Subcutaneous At Bedtime  Patient taking differently: Inject 26 Units Subcutaneous At Bedtime  4/23/17  Yes Natanael Rios MD   insulin lispro (HUMALOG KWIKPEN) 100 UNIT/ML injection Inject Subcutaneous 3 times daily (before meals) sliding scale   Yes Reported, Patient   Multiple Vitamins-Minerals (MULTIVITAL) TABS Take 1 tablet by mouth 11/19/15  Yes Rich Haque MD   omeprazole (PRILOSEC) 20 MG CR capsule Take 1 capsule (20 mg) by mouth daily 4/3/18  Yes Rich Haque MD   potassium chloride SA (K-DUR/KLOR-CON M) 20 MEQ CR tablet Take 1 tablet (20 mEq) by mouth 2 times daily  Patient taking differently: Take 20 mEq by mouth daily  6/20/18  Yes Rich Haque MD   Sharps Container MISC 1 Units 4 times daily as needed 4/23/17  Yes Natanael Rios MD   XIFAXAN 550 MG TABS  tablet TAKE 1 TABLET BY MOUTH TWICE DAILY. 11/4/19  Yes Rich Haque MD       Allergies  Allergies   Allergen Reactions     Atorvastatin Calcium      No Clinical Screening - See Comments      Platelets ( infusion)       Review of systems  A 10-point review of systems was negative    Examination  N/A    Laboratory  Nil recent    Radiology  Nil recent    Assessment  60 year old male who presents for routine follow-up of decompensated alcoholic cirrhosis complicated with history of ascites.  MELD-Na= ?.  Last ETOH=  ?.  Ascites well-controlled on current diuretics.  Hepatic encephalopathy controlled on rifaximin only.  Due now for HCC screening.  Overdue for surveillance of esophageal varices.    Plan  1.  Check CMP, INR, CBC  2.  Abd U/S  3.  EGD with me  4.  Continue diuretics at current doses  5.  Continue rifaximin 550mg PO BID  6.  Follow-up in 6 months    Rich Little MD  Hepatology  [facility]

## 2020-08-31 NOTE — PROGRESS NOTES
"Ravindra Fregoso is a 60 year old male who is being evaluated via a billable telephone visit.      The patient has been notified of following:     \"This telephone visit will be conducted via a call between you and your physician/provider. We have found that certain health care needs can be provided without the need for a physical exam.  This service lets us provide the care you need with a short phone conversation.  If a prescription is necessary we can send it directly to your pharmacy.  If lab work is needed we can place an order for that and you can then stop by our lab to have the test done at a later time.    Telephone visits are billed at different rates depending on your insurance coverage. During this emergency period, for some insurers they may be billed the same as an in-person visit.  Please reach out to your insurance provider with any questions.    If during the course of the call the physician/provider feels a telephone visit is not appropriate, you will not be charged for this service.\"    Patient has given verbal consent for Telephone visit?  Yes    What phone number would you like to be contacted at? [phone]    How would you like to obtain your AVS? Mail a copy    Phone call duration: 11 minutes  _____________________________________________________________________________    [facility]    Hepatology follow-up    Follow-up visit for cirrhosis    Subjective:  60 year old male    Cirrhosis  - ETOH  - last ETOH ~2 months ago  - hx ascites, on Bumex and amiloride (gynecomastia on spironolactone)  - hx HE, on rifaximin   - no hx variceal bleed  - last EGD Dec 2016- gd I varices, portal HTN gastropathy  - HCC screening- Abd U/S 3/4/2020     Last clinic visit March 2020.  No new medications, ER visits or hospital admissions since last clinic visit.    Patient is well today.  He denies any symptoms related to his liver disease.  He continues to get mild dyspnea on " "exertion.  No chest pain, palpitations or presyncope.    Patient denies jaundice, lower extremity edema, abdominal distension, lethargy or confusion.    Patient denies melena, hematemesis or hematochezia.    Patient denies fevers, sweats or chills.  No cough or dyspnea.    Weight stable.  Appetite is good.    Patient continues to smoke.  He does not remember exactly when he last drank alcohol- \"months ago.\"  He occasionally wears a mask when out in public but is practicing social distancing.      Medical hx Surgical hx   Past Medical History:   Diagnosis Date     Cirrhosis of liver (H)     ETOH     Depression      Osteoporosis      Polyps, colonic       Past Surgical History:   Procedure Laterality Date     ESOPHAGOSCOPY, GASTROSCOPY, DUODENOSCOPY (EGD), COMBINED  11/2/2012    Procedure: COMBINED ESOPHAGOSCOPY, GASTROSCOPY, DUODENOSCOPY (EGD);;  Surgeon: Darell Stewart MD;  Location:  GI     ESOPHAGOSCOPY, GASTROSCOPY, DUODENOSCOPY (EGD), COMBINED  12/6/2013    Procedure: COMBINED ESOPHAGOSCOPY, GASTROSCOPY, DUODENOSCOPY (EGD);;  Surgeon: Darell Stewart MD;  Location:  GI     ESOPHAGOSCOPY, GASTROSCOPY, DUODENOSCOPY (EGD), COMBINED N/A 12/17/2014    Procedure: COMBINED ESOPHAGOSCOPY, GASTROSCOPY, DUODENOSCOPY (EGD), BIOPSY SINGLE OR MULTIPLE;  Surgeon: Philippe Zhu MD;  Location:  GI     ESOPHAGOSCOPY, GASTROSCOPY, DUODENOSCOPY (EGD), COMBINED N/A 12/22/2016    Procedure: COMBINED ESOPHAGOSCOPY, GASTROSCOPY, DUODENOSCOPY (EGD);  Surgeon: Rich Haque MD;  Location:  GI     HC UGI ENDOSCOPY W EUS Left 5/4/2015    Procedure: COMBINED ENDOSCOPIC ULTRASOUND, ESOPHAGOSCOPY, GASTROSCOPY, DUODENOSCOPY (EGD);  Surgeon: Tremaine Sanches MD;  Location:  GI     HERNIORRHAPHY INGUINAL      bilateral repair     ORTHOPEDIC SURGERY      fx left leg, left hip      ORTHOPEDIC SURGERY      fx right ankle and right arm          Medications  Prior to Admission medications  "   Medication Sig Start Date End Date Taking? Authorizing Provider   alendronate (FOSAMAX) 70 MG tablet Take 70 mg by mouth every 7 days   Yes Reported, Patient   aMILoride (MIDAMOR) 5 MG tablet Take 2 tablets (10 mg) by mouth daily  Patient taking differently: Take 5 mg by mouth 2 times daily  6/12/17  Yes Rich Haque MD   blood glucose (NO BRAND SPECIFIED) lancets standard Use to test blood sugar 4 times daily or as directed. 4/23/17  Yes Natanael Rios MD   blood glucose monitoring (ACCU-CHEK ROBERTO) test strip Use to test blood sugars 4 times daily or as directed. 4/23/17  Yes Natanael Rios MD   bumetanide (BUMEX) 1 MG tablet Take 1 tablet (1 mg) by mouth daily 3/12/18  Yes Rich Haque MD   FLUoxetine (PROZAC) 40 MG capsule Take 40 mg by mouth daily 1/22/20  Yes Reported, Patient   insulin glargine (LANTUS) 100 UNIT/ML injection Inject 22 Units Subcutaneous At Bedtime  Patient taking differently: Inject 26 Units Subcutaneous At Bedtime  4/23/17  Yes Natanael Rios MD   insulin lispro (HUMALOG KWIKPEN) 100 UNIT/ML injection Inject Subcutaneous 3 times daily (before meals) sliding scale   Yes Reported, Patient   Multiple Vitamins-Minerals (MULTIVITAL) TABS Take 1 tablet by mouth 11/19/15  Yes Rich Haque MD   omeprazole (PRILOSEC) 20 MG CR capsule Take 1 capsule (20 mg) by mouth daily 4/3/18  Yes Rich Haque MD   potassium chloride SA (K-DUR/KLOR-CON M) 20 MEQ CR tablet Take 1 tablet (20 mEq) by mouth 2 times daily  Patient taking differently: Take 20 mEq by mouth daily  6/20/18  Yes Rich Haque MD   Sharps Container MISC 1 Units 4 times daily as needed 4/23/17  Yes Natanael Rios MD   XIFAXAN 550 MG TABS tablet TAKE 1 TABLET BY MOUTH TWICE DAILY. 11/4/19  Yes Rich Haque MD       Allergies  Allergies   Allergen Reactions     Atorvastatin Calcium      No Clinical Screening - See Comments      Platelets ( infusion)        Review of systems  A 10-point review of systems was negative    Examination  N/A    Laboratory  Nil recent    Radiology  Nil recent    Assessment  60 year old male who presents for routine follow-up of decompensated alcoholic cirrhosis complicated with history of ascites.  MELD-Na= ?.  Last ETOH=  ?.  Ascites well-controlled on current diuretics.  Hepatic encephalopathy controlled on rifaximin only.  Due now for HCC screening.  Overdue for surveillance of esophageal varices.    Plan  1.  Check CMP, INR, CBC  2.  Abd U/S  3.  EGD with me  4.  Continue diuretics at current doses  5.  Continue rifaximin 550mg PO BID  6.  Follow-up in 6 months    Rich Little MD  Hepatology  [facility]

## 2020-09-01 ENCOUNTER — TRANSFERRED RECORDS (OUTPATIENT)
Dept: HEALTH INFORMATION MANAGEMENT | Facility: CLINIC | Age: 60
End: 2020-09-01

## 2020-09-03 ENCOUNTER — EXTERNAL ORDER RESULTS (OUTPATIENT)
Dept: GASTROENTEROLOGY | Facility: CLINIC | Age: 60
End: 2020-09-03

## 2020-09-03 LAB
ALBUMIN SERPL-MCNC: 3 G/DL (ref 3.5–5)
ALP SERPL-CCNC: 248 U/L (ref 38–126)
ALT SERPL-CCNC: 24 U/L (ref 0–49)
ANION GAP SERPL CALCULATED.3IONS-SCNC: 4 MMOL/L (ref 7–16)
AST SERPL-CCNC: 41 U/L (ref 2–40)
BILIRUB SERPL-MCNC: 1.3 MG/DL (ref 0.2–1.2)
BILIRUBIN DIRECT: 0 MG/DL (ref 0.1–0.5)
BUN SERPL-MCNC: 13 MG/DL (ref 8–25)
CALCIUM SERPL-MCNC: 8.1 MG/DL (ref 8.5–10.5)
CHLORIDE SERPLBLD-SCNC: 112 MMOL/L (ref 98–107)
CO2 SERPL-SCNC: 26 MMOL/L (ref 21–31)
CREAT SERPL-MCNC: 1.03 MG/DL (ref 0.57–1.11)
ERYTHROCYTE [DISTWIDTH] IN BLOOD BY AUTOMATED COUNT: 14.5 % (ref 11.9–15.5)
GFR SERPL CREATININE-BSD FRML MDRD: >60 ML/MIN/1.73M2
GLUCOSE SERPL-MCNC: 108 MG/DL (ref 70–99)
HCT VFR BLD AUTO: 38 % (ref 38.8–50)
HEMOGLOBIN: 12.6 G/DL (ref 13.3–17.7)
INR PPP: 1.2 (ref 0.9–1.1)
MCH RBC QN AUTO: 31.7 PG (ref 27.6–33.3)
MCHC RBC AUTO-ENTMCNC: 33.2 G/DL (ref 31.5–35.2)
MCV RBC AUTO: 95.7 FL (ref 80–100)
PLATELET # BLD AUTO: 141 10^9/L (ref 150–450)
POTASSIUM SERPL-SCNC: 4.3 MMOL/L (ref 3.5–5)
PROT SERPL-MCNC: 6.5 G/DL (ref 6–8)
PROTHROMBIN TIME: 12.5 SECONDS (ref 10.1–12)
RBC # BLD AUTO: 3.97 10^12/L (ref 4.32–5.72)
SODIUM SERPL-SCNC: 142 MMOL/L (ref 135–145)
WBC # BLD AUTO: 6.5 10^9/L (ref 3.5–10.5)

## 2020-12-14 ENCOUNTER — HEALTH MAINTENANCE LETTER (OUTPATIENT)
Age: 60
End: 2020-12-14

## 2020-12-30 ENCOUNTER — TELEPHONE (OUTPATIENT)
Dept: GASTROENTEROLOGY | Facility: CLINIC | Age: 60
End: 2020-12-30

## 2020-12-31 ENCOUNTER — TELEPHONE (OUTPATIENT)
Dept: GASTROENTEROLOGY | Facility: CLINIC | Age: 60
End: 2020-12-31

## 2020-12-31 DIAGNOSIS — K76.82 HEPATIC ENCEPHALOPATHY (H): ICD-10-CM

## 2020-12-31 NOTE — TELEPHONE ENCOUNTER
Lab orders faxed.    [facility] application provider portion faxed.  Wife will send in patient portion of application.    JUAN MIGUEL [facility]    Phone Message    May a detailed message be left on voicemail: yes     Reason for Call: Order(s): Other:   Reason for requested: Pt is requesting for their lab orders to be sent to the [facility].  Date needed: asa  Provider name: Dr. Little      Action Taken: Message routed to:  [facility] ([facility] hep    Travel Screening: Not Applicable

## 2021-01-01 NOTE — MR AVS SNAPSHOT
"              After Visit Summary   4/25/2017    Ravindra Fregoso    MRN: [id number]           Patient Information     Date Of Birth          1960        Visit Information        Provider Department      4/25/2017 2:00 PM Kalina oCrnell, [facility] and Infectious Diseases MTM        Care Instructions    Recommendations from today's MTM visit:                                                    MTM (medication therapy management) is a service provided by a clinical pharmacist designed to help you get the most of out of your medicines.   Today we reviewed what your medicines are for, how to know if they are working, that your medicines are safe and how to make your medicine regimen as easy as possible.      1) Please take Alendronate (Fosamax), once per week, in the morning, on an empty stomach, 30 minutes away from your other medications/food/beverages.    2) Discuss with your PCP if aspirin therapy is appropriate for you.      3) Smoking cessation, \"You Can Do It\", enclosed.       Next MTM visit: I will call you again to follow up on Tuesday, May 9th, at 12:00 pm. This will appear as a \"telemed\" appointment. Please do not come to this appointment.       To schedule another MTM appointment, please call the clinic directly or you may call the MTM scheduling line at [phone] or toll-free at [phone].     My Clinical Pharmacist's contact information:                                                      It was a pleasure talking to you today!  Please feel free to contact me with any questions or concerns you have.      Kalina Cornell, MTM Pharmacist.   [phone]      You may receive a survey about the MTM services you received.  I would appreciate your feedback to help me serve you better in the future. Please fill it out and return it when you can. Your comments will be anonymous.      My healthcare goals:                                                      Quit smoking and take care " of diabetes.               Follow-ups after your visit        Your next 10 appointments already scheduled     Jun 12, 2017  7:00 AM CDT   Lab with  LAB   [facility] ([facility])    [address]  1st Floor  [facility] [id number] [phone]            Jun 12, 2017  8:00 AM CDT   (Arrive by 7:45 AM)   Return General Liver with Rich Wong MD   [facility] ([facility])    [address] [place] [address]  [address] [facility] [id number] [phone]            Nathan 15, 2017  9:00 AM CDT   (Arrive by 8:45 AM)   New Patient Visit with Guru Doron Barnett MD   [facility] ([facility])    [address] [place] [address]  [address] [facility] [id number] [phone]              Who to contact     If you have questions or need follow up information about today's clinic visit or your schedule please contact [facility] AND INFECTIOUS DISEASES [facility] directly at No information on file..  Normal or non-critical lab and imaging results will be communicated to you by Personal MedSystemshart, letter or phone within 4 business days after the clinic has received the results. If you do not hear from us within 7 days, please contact the clinic through [employer] or phone. If you have a critical or abnormal lab result, we will notify you by phone as soon as possible.  Submit refill requests through [employer] or call your pharmacy and they will forward the refill request to us. Please allow 3 business days for your refill to be completed.          Additional Information About Your Visit        Personal MedSystemshart Information     [employer]ed gives you secure access to your electronic health record. If you see a primary care provider, you can also send messages to your care team and make appointments. If you have questions, please call your primary care clinic.  If you do not have a primary care provider, please call [phone]  and they will assist you.        Care EveryWhere ID     This is your Care EveryWhere ID. This could be used by other organizations to access your [facility] medical records  [id number]         Blood Pressure from Last 3 Encounters:   04/23/17 134/80   02/19/17 133/80   12/22/16 148/88    Weight from Last 3 Encounters:   04/22/17 145 lb 15.1 oz (66.2 kg)   02/19/17 149 lb 0.5 oz (67.6 kg)   12/05/16 144 lb 12.8 oz (65.7 kg)              Today, you had the following     No orders found for display       Primary Care Provider Office Phone # Fax #    Moo Flores [phone]       [facility] [address]   [facility] [address]        Thank you!     Thank you for choosing [facility] AND INFECTIOUS DISEASES [facility]  for your care. Our goal is always to provide you with excellent care. Hearing back from our patients is one way we can continue to improve our services. Please take a few minutes to complete the written survey that you may receive in the mail after your visit with us. Thank you!             Your Updated Medication List - Protect others around you: Learn how to safely use, store and throw away your medicines at www.disposemymeds.org.          This list is accurate as of: 4/25/17 11:59 PM.  Always use your most recent med list.                   Brand Name Dispense Instructions for use    amoxicillin 500 MG capsule    AMOXIL    4 capsule    Take 4 capsules (2,000 mg) by mouth See Admin Instructions       amylase-lipase-protease 21933 UNITS Cpep per EC capsule    CREON 24    60 capsule    Take 1 capsule (24,000 Units) by mouth 3 times daily (with meals)       Benzocaine 20 % Aero spray    HURRICAINE/TOPEX    1 each    Take 1 mL by mouth every 3 hours as needed for moderate pain       benzocaine-menthol 6-10 MG lozenge    CHLORASEPTIC    84 lozenge    Place 1 lozenge inside cheek every hour as needed for sore throat       blood glucose lancets standard    no brand specified    1  Box    Use to test blood sugar 4 times daily or as directed.       blood glucose monitoring test strip    ACCU-CHEK ROBERTO    100 strip    Use to test blood sugars 4 times daily or as directed.       bumetanide 1 MG tablet    BUMEX    90 tablet    Take 1 tablet (1 mg) by mouth daily       cephalexin 250 MG capsule    KEFLEX    28 capsule    Take 1 capsule (250 mg) by mouth every 6 hours for 7 days       ESCITALOPRAM OXALATE PO      Take 20 mg by mouth daily       FOSAMAX 70 MG tablet   Generic drug:  alendronate      Take 70 mg by mouth every 7 days       ibuprofen 600 MG tablet    ADVIL/MOTRIN    20 tablet    Take 1 tablet (600 mg) by mouth every 6 hours as needed for moderate pain       * insulin aspart 100 UNIT/ML injection    NovoLOG PEN    3 mL    Inject 1-10 Units Subcutaneous 3 times daily (before meals)       * insulin aspart 100 UNIT/ML injection    NovoLOG PEN    3 mL    Inject 1-7 Units Subcutaneous At Bedtime       insulin glargine 100 UNIT/ML injection    LANTUS    3 mL    Inject 22 Units Subcutaneous At Bedtime       lactulose 10 GM/15ML solution    CHRONULAC     Take 20 g by mouth 2 times daily       MULTIVITAL Tabs     30 tablet    Take 1 tablet by mouth       omeprazole 20 MG CR capsule    priLOSEC    90 capsule    Take 1 capsule (20 mg) by mouth daily       potassium chloride gillian er    K-DUR     Take 20 mEq by mouth daily       * rifaximin 550 MG Tabs tablet    XIFAXAN    60 tablet    Take 1 tablet (550 mg) by mouth 2 times daily       * rifaximin 550 MG Tabs tablet    XIFAXAN    10 tablet    Take 1 tablet (550 mg) by mouth 2 times daily for 5 days       Sharps Container Misc     1 each    1 Units 4 times daily as needed       * Notice:  This list has 4 medication(s) that are the same as other medications prescribed for you. Read the directions carefully, and ask your doctor or other care provider to review them with you.       weight check

## 2021-01-18 ENCOUNTER — TRANSFERRED RECORDS (OUTPATIENT)
Dept: HEALTH INFORMATION MANAGEMENT | Facility: CLINIC | Age: 61
End: 2021-01-18

## 2021-01-18 LAB
ALT SERPL-CCNC: 28 U/L (ref 0–49)
AST SERPL-CCNC: 46 U/L (ref 2–40)
CREAT SERPL-MCNC: 1.24 MG/DL (ref 0.57–1.11)
GFR SERPL CREATININE-BSD FRML MDRD: >60 ML/MIN/1.73M2
GLUCOSE SERPL-MCNC: 79 MG/DL (ref 65–100)
INR PPP: 1.3 (ref 0.9–1.1)
POTASSIUM SERPL-SCNC: 4.1 MMOL/L (ref 3.5–5)

## 2021-03-01 ENCOUNTER — VIRTUAL VISIT (OUTPATIENT)
Dept: GASTROENTEROLOGY | Facility: CLINIC | Age: 61
End: 2021-03-01
Attending: INTERNAL MEDICINE
Payer: COMMERCIAL

## 2021-03-01 DIAGNOSIS — K70.31 ALCOHOLIC CIRRHOSIS OF LIVER WITH ASCITES (H): Primary | ICD-10-CM

## 2021-03-01 PROCEDURE — 99214 OFFICE O/P EST MOD 30 MIN: CPT | Mod: 95 | Performed by: INTERNAL MEDICINE

## 2021-03-01 ASSESSMENT — PAIN SCALES - GENERAL: PAINLEVEL: NO PAIN (0)

## 2021-03-01 NOTE — PROGRESS NOTES
Ravindra is a 60 year old who is being evaluated via a billable telephone visit.      What phone number would you like to be contacted at? [phone]  How would you like to obtain your AVS? Zane  Phone call duration: 8 minutes  _________________________________________________________________    [facility]    Hepatology follow-up    Follow-up visit for cirrhosis    Subjective:  60 year old male    Cirrhosis  - ETOH  - dx 2005  - hx ascites, on Bumex and amiloride (gynecomastia on spironolactone)  - hx HE, on rifaximin   - no hx variceal bleed  - last EGD Dec 2016- gd I varices, portal HTN gastropathy  - HCC screening- Abd U/S 3/4/2020     Last visit Aug 2020.  No new medications, ER visits or hospital admissions since last clinic visit.    Patient is well today.  He denies any symptoms specific to liver disease.    Patient denies jaundice, lower extremity edema, abdominal distension, lethargy or confusion.    Patient denies melena, hematemesis or hematochezia.    Patient denies fevers, sweats or chills.  No cough or dyspnea.    Weight stable.  Appetite is good.    Patient still drink alcohol.  He has a few drinks when he goes to the [employer].  He goes to the [employer] every other month.  Patient practices social distancing and wears a mask when out in public.      Medical hx Surgical hx   Past Medical History:   Diagnosis Date     Cirrhosis of liver (H)     ETOH     Depression      Osteoporosis      Polyps, colonic       Past Surgical History:   Procedure Laterality Date     ESOPHAGOSCOPY, GASTROSCOPY, DUODENOSCOPY (EGD), COMBINED  11/2/2012    Procedure: COMBINED ESOPHAGOSCOPY, GASTROSCOPY, DUODENOSCOPY (EGD);;  Surgeon: Darell Stewart MD;  Location: [facility]     ESOPHAGOSCOPY, GASTROSCOPY, DUODENOSCOPY (EGD), COMBINED  12/6/2013    Procedure: COMBINED ESOPHAGOSCOPY, GASTROSCOPY, DUODENOSCOPY (EGD);;  Surgeon: Darell Stewart MD;  Location: [facility]      ESOPHAGOSCOPY, GASTROSCOPY, DUODENOSCOPY (EGD), COMBINED N/A 12/17/2014    Procedure: COMBINED ESOPHAGOSCOPY, GASTROSCOPY, DUODENOSCOPY (EGD), BIOPSY SINGLE OR MULTIPLE;  Surgeon: Philippe Zhu MD;  Location:  GI     ESOPHAGOSCOPY, GASTROSCOPY, DUODENOSCOPY (EGD), COMBINED N/A 12/22/2016    Procedure: COMBINED ESOPHAGOSCOPY, GASTROSCOPY, DUODENOSCOPY (EGD);  Surgeon: Rich Haque MD;  Location:  GI     HC UGI ENDOSCOPY W EUS Left 5/4/2015    Procedure: COMBINED ENDOSCOPIC ULTRASOUND, ESOPHAGOSCOPY, GASTROSCOPY, DUODENOSCOPY (EGD);  Surgeon: Tremaine Sanches MD;  Location:  GI     HERNIORRHAPHY INGUINAL      bilateral repair     ORTHOPEDIC SURGERY      fx left leg, left hip      ORTHOPEDIC SURGERY      fx right ankle and right arm          Medications  Prior to Admission medications    Medication Sig Start Date End Date Taking? Authorizing Provider   alendronate (FOSAMAX) 70 MG tablet Take 70 mg by mouth every 7 days   Yes Reported, Patient   aMILoride (MIDAMOR) 5 MG tablet Take 2 tablets (10 mg) by mouth daily 6/12/17  Yes Rich Haque MD   blood glucose (NO BRAND SPECIFIED) lancets standard Use to test blood sugar 4 times daily or as directed. 4/23/17  Yes Natanael Rios MD   blood glucose monitoring (ACCU-CHEK ROBERTO) test strip Use to test blood sugars 4 times daily or as directed. 4/23/17  Yes Natanael Rios MD   bumetanide (BUMEX) 1 MG tablet Take 1 tablet (1 mg) by mouth daily 3/12/18  Yes Rich Haque MD   FLUoxetine (PROZAC) 40 MG capsule Take 40 mg by mouth daily 1/22/20  Yes Reported, Patient   insulin glargine (LANTUS) 100 UNIT/ML injection Inject 22 Units Subcutaneous At Bedtime  Patient taking differently: Inject 26 Units Subcutaneous At Bedtime  4/23/17  Yes Natanael Rios MD   insulin lispro (HUMALOG KWIKPEN) 100 UNIT/ML injection Inject Subcutaneous 3 times daily (before meals) sliding scale   Yes Reported, Patient   Multiple  Vitamins-Minerals (MULTIVITAL) TABS Take 1 tablet by mouth 11/19/15  Yes Rich Haque MD   omeprazole (PRILOSEC) 20 MG CR capsule Take 1 capsule (20 mg) by mouth daily 4/3/18  Yes Rich Haque MD   potassium chloride SA (K-DUR/KLOR-CON M) 20 MEQ CR tablet Take 1 tablet (20 mEq) by mouth 2 times daily  Patient taking differently: Take 20 mEq by mouth daily  6/20/18  Yes Rich Haque MD   rifaximin (XIFAXAN) 550 MG TABS tablet Take 1 tablet (550 mg) by mouth 2 times daily 12/31/20  Yes Rich Haque MD   Sharps Container MISC 1 Units 4 times daily as needed 4/23/17  Yes Natanael Rios MD       Allergies  Allergies   Allergen Reactions     Atorvastatin Calcium      No Clinical Screening - See Comments      Platelets ( infusion)       Review of systems  A 10-point review of systems was negative    Examination  N/A    Laboratory  Lab Results   Component Value Date     1/18/2021    POTASSIUM 4.1 01/18/2021    CHLORIDE 110 1/18/2021    CO2 25 1/18/2021    BUN 12 1/18/2021    CR 1.24 01/18/2021       Lab Results   Component Value Date    BILITOTAL 1.0 1/18/2021    ALT 28 01/18/2021    AST 46 01/18/2021    ALKPHOS 111 1/18/2021     Lab Results   Component Value Date    INR 1.3 01/18/2021     WCC 7.8, hgb 12.3, plt 147    Radiology  Nil recent    Assessment  60 year old male who presents for routine follow-up of decompensated alcoholic cirrhosis complicated with ascites and hepatic encephalopathy.  MELD-Na= 11(stable).  Last ETOH= Feb 2021.  Ascites well-controlled on current diuretics.  Hepatic encephalopathy well-controlled on rifaximin alone.  Overdue for EGD for surveillance of esophageal varices.  Due now for HCC screening.    Plan  1.  Complete abstinence from alcohol  2.  Low Na diet  3.  Continue amiloride and bumetanide  4.  Continue rifaximin 550mg PO BID  5.  EGD with me  6.  Abd U/S  7.  Follow-up in 6 months    Rich Little MD  Hepatology  [facility]  [facility]

## 2021-03-01 NOTE — LETTER
3/1/2021         RE: Ravindra Fregoso  [address]  [facility] [address]        Dear Colleague,    Thank you for referring your patient, Ravindra Fregoso, to the [facility]. Please see a copy of my visit note below.    Ravindra is a 60 year old who is being evaluated via a billable telephone visit.      What phone number would you like to be contacted at? [phone]  How would you like to obtain your AVS? [email or web address]  Phone call duration: 8 minutes  _________________________________________________________________    [facility]    Hepatology follow-up    Follow-up visit for cirrhosis    Subjective:  60 year old male    Cirrhosis  - ETOH  - dx 2005  - hx ascites, on Bumex and amiloride (gynecomastia on spironolactone)  - hx HE, on rifaximin   - no hx variceal bleed  - last EGD Dec 2016- gd I varices, portal HTN gastropathy  - HCC screening- Abd U/S 3/4/2020     Last visit Aug 2020.  No new medications, ER visits or hospital admissions since last clinic visit.    Patient is well today.  He denies any symptoms specific to liver disease.    Patient denies jaundice, lower extremity edema, abdominal distension, lethargy or confusion.    Patient denies melena, hematemesis or hematochezia.    Patient denies fevers, sweats or chills.  No cough or dyspnea.    Weight stable.  Appetite is good.    Patient still drink alcohol.  He has a few drinks when he goes to the [employer].  He goes to the [employer] every other month.  Patient practices social distancing and wears a mask when out in public.      Medical hx Surgical hx   Past Medical History:   Diagnosis Date     Cirrhosis of liver (H)     ETOH     Depression      Osteoporosis      Polyps, colonic       Past Surgical History:   Procedure Laterality Date     ESOPHAGOSCOPY, GASTROSCOPY, DUODENOSCOPY (EGD), COMBINED  11/2/2012    Procedure: COMBINED ESOPHAGOSCOPY, GASTROSCOPY, DUODENOSCOPY (EGD);;  Surgeon: Pat  Darell DYE MD;  Location:  GI     ESOPHAGOSCOPY, GASTROSCOPY, DUODENOSCOPY (EGD), COMBINED  12/6/2013    Procedure: COMBINED ESOPHAGOSCOPY, GASTROSCOPY, DUODENOSCOPY (EGD);;  Surgeon: Darell Stewart MD;  Location:  GI     ESOPHAGOSCOPY, GASTROSCOPY, DUODENOSCOPY (EGD), COMBINED N/A 12/17/2014    Procedure: COMBINED ESOPHAGOSCOPY, GASTROSCOPY, DUODENOSCOPY (EGD), BIOPSY SINGLE OR MULTIPLE;  Surgeon: Philippe Zhu MD;  Location:  GI     ESOPHAGOSCOPY, GASTROSCOPY, DUODENOSCOPY (EGD), COMBINED N/A 12/22/2016    Procedure: COMBINED ESOPHAGOSCOPY, GASTROSCOPY, DUODENOSCOPY (EGD);  Surgeon: Rich Haque MD;  Location:  GI     HC UGI ENDOSCOPY W EUS Left 5/4/2015    Procedure: COMBINED ENDOSCOPIC ULTRASOUND, ESOPHAGOSCOPY, GASTROSCOPY, DUODENOSCOPY (EGD);  Surgeon: Tremaine Sanches MD;  Location:  GI     HERNIORRHAPHY INGUINAL      bilateral repair     ORTHOPEDIC SURGERY      fx left leg, left hip      ORTHOPEDIC SURGERY      fx right ankle and right arm          Medications  Prior to Admission medications    Medication Sig Start Date End Date Taking? Authorizing Provider   alendronate (FOSAMAX) 70 MG tablet Take 70 mg by mouth every 7 days   Yes Reported, Patient   aMILoride (MIDAMOR) 5 MG tablet Take 2 tablets (10 mg) by mouth daily 6/12/17  Yes Rich Haque MD   blood glucose (NO BRAND SPECIFIED) lancets standard Use to test blood sugar 4 times daily or as directed. 4/23/17  Yes Natanael Rios MD   blood glucose monitoring (ACCU-CHEK ROBERTO) test strip Use to test blood sugars 4 times daily or as directed. 4/23/17  Yes Natanael Rios MD   bumetanide (BUMEX) 1 MG tablet Take 1 tablet (1 mg) by mouth daily 3/12/18  Yes Rich Haque MD   FLUoxetine (PROZAC) 40 MG capsule Take 40 mg by mouth daily 1/22/20  Yes Reported, Patient   insulin glargine (LANTUS) 100 UNIT/ML injection Inject 22 Units Subcutaneous At Bedtime  Patient taking  differently: Inject 26 Units Subcutaneous At Bedtime  4/23/17  Yes Natanael Rios MD   insulin lispro (HUMALOG KWIKPEN) 100 UNIT/ML injection Inject Subcutaneous 3 times daily (before meals) sliding scale   Yes Reported, Patient   Multiple Vitamins-Minerals (MULTIVITAL) TABS Take 1 tablet by mouth 11/19/15  Yes Rich Haque MD   omeprazole (PRILOSEC) 20 MG CR capsule Take 1 capsule (20 mg) by mouth daily 4/3/18  Yes Rich Haque MD   potassium chloride SA (K-DUR/KLOR-CON M) 20 MEQ CR tablet Take 1 tablet (20 mEq) by mouth 2 times daily  Patient taking differently: Take 20 mEq by mouth daily  6/20/18  Yes Rich Haque MD   rifaximin (XIFAXAN) 550 MG TABS tablet Take 1 tablet (550 mg) by mouth 2 times daily 12/31/20  Yes Rich Haque MD   Sharps Container MISC 1 Units 4 times daily as needed 4/23/17  Yes Natanael Rios MD       Allergies  Allergies   Allergen Reactions     Atorvastatin Calcium      No Clinical Screening - See Comments      Platelets ( infusion)       Review of systems  A 10-point review of systems was negative    Examination  N/A    Laboratory  Lab Results   Component Value Date     1/18/2021    POTASSIUM 4.1 01/18/2021    CHLORIDE 110 1/18/2021    CO2 25 1/18/2021    BUN 12 1/18/2021    CR 1.24 01/18/2021       Lab Results   Component Value Date    BILITOTAL 1.0 1/18/2021    ALT 28 01/18/2021    AST 46 01/18/2021    ALKPHOS 111 1/18/2021     Lab Results   Component Value Date    INR 1.3 01/18/2021     WCC 7.8, hgb 12.3, plt 147    Radiology  Nil recent    Assessment  60 year old male who presents for routine follow-up of decompensated alcoholic cirrhosis complicated with ascites and hepatic encephalopathy.  MELD-Na= 11(stable).  Last ETOH= Feb 2021.  Ascites well-controlled on current diuretics.  Hepatic encephalopathy well-controlled on rifaximin alone.  Overdue for EGD for surveillance of esophageal varices.  Due now for HCC  screening.    Plan  1.  Complete abstinence from alcohol  2.  Low Na diet  3.  Continue amiloride and bumetanide  4.  Continue rifaximin 550mg PO BID  5.  EGD with me  6.  Abd U/S  7.  Follow-up in 6 months    Rich Little MD  Hepatology  [facility]

## 2021-03-02 ENCOUNTER — TELEPHONE (OUTPATIENT)
Dept: GASTROENTEROLOGY | Facility: CLINIC | Age: 61
End: 2021-03-02

## 2021-03-02 ENCOUNTER — TELEPHONE (OUTPATIENT)
Dept: GASTROENTEROLOGY | Facility: OUTPATIENT CENTER | Age: 61
End: 2021-03-02

## 2021-03-02 NOTE — TELEPHONE ENCOUNTER
Patient is scheduled for EGD with Dr. MEHTA    Spoke with: CHRISTIN    Date of Procedure: 04/09/2021    Location: [facility]    Sedation Type CS    Conscious Sedation- Needs  for 6 hours after the procedure  MAC/General-Needs  for 24 hours after procedure    Pre-op for Unit J MAC and OR NOT NEEDED    (if yes advise patient they will need a pre-op prior to procedure)      Is patient on blood thinners? -NO (If yes- inform patient to follow up with PCP or provider for follow up instructions)     Informed patient they will need an adult  YES  Cannot take any type of public or medical transportation alone    Informed Patient of COVID Test Requirement YES    Preferred Pharmacy for Pre Prescription NA    Confirmed Nurse will call to complete assessment YES    Additional comments: NA

## 2021-03-02 NOTE — TELEPHONE ENCOUNTER
Caller :Patient    Reason for cancel? Cx 4/9 due to schedule conflict    Rescheduled? Y- 4/16/2021     Will patient call back to reschedule?n/a

## 2021-03-02 NOTE — TELEPHONE ENCOUNTER
Ultrasound order faxed to [facility].  Patient notified.    Fabiana PANG LPN  Hepatology Clinic      Health Call Center    Phone Message    May a detailed message be left on voicemail: yes     Reason for Call: Order(s): Other:   Reason for requested: Pt is requesting for their US order to be sent to the [facility] in  [place].  Date needed: asa  Provider name: Dr. Little      Action Taken: Message routed to:  [facility] ([facility] hep    Travel Screening: Not Applicable

## 2021-03-31 DIAGNOSIS — Z11.59 ENCOUNTER FOR SCREENING FOR OTHER VIRAL DISEASES: ICD-10-CM

## 2021-04-12 DIAGNOSIS — Z11.59 ENCOUNTER FOR SCREENING FOR OTHER VIRAL DISEASES: ICD-10-CM

## 2021-04-12 LAB
SARS-COV-2 RNA RESP QL NAA+PROBE: NORMAL
SPECIMEN SOURCE: NORMAL

## 2021-04-12 PROCEDURE — U0005 INFEC AGEN DETEC AMPLI PROBE: HCPCS | Performed by: INTERNAL MEDICINE

## 2021-04-12 PROCEDURE — U0003 INFECTIOUS AGENT DETECTION BY NUCLEIC ACID (DNA OR RNA); SEVERE ACUTE RESPIRATORY SYNDROME CORONAVIRUS 2 (SARS-COV-2) (CORONAVIRUS DISEASE [COVID-19]), AMPLIFIED PROBE TECHNIQUE, MAKING USE OF HIGH THROUGHPUT TECHNOLOGIES AS DESCRIBED BY CMS-2020-01-R: HCPCS | Performed by: INTERNAL MEDICINE

## 2021-04-13 LAB
LABORATORY COMMENT REPORT: NORMAL
SARS-COV-2 RNA RESP QL NAA+PROBE: NEGATIVE
SPECIMEN SOURCE: NORMAL

## 2021-04-16 ENCOUNTER — HOSPITAL ENCOUNTER (OUTPATIENT)
Facility: AMBULATORY SURGERY CENTER | Age: 61
Discharge: HOME | End: 2021-04-16
Attending: INTERNAL MEDICINE | Admitting: INTERNAL MEDICINE
Payer: COMMERCIAL

## 2021-04-16 VITALS
OXYGEN SATURATION: 96 % | TEMPERATURE: 98.1 F | HEART RATE: 66 BPM | WEIGHT: 155 LBS | SYSTOLIC BLOOD PRESSURE: 130 MMHG | BODY MASS INDEX: 26.46 KG/M2 | HEIGHT: 64 IN | RESPIRATION RATE: 20 BRPM | DIASTOLIC BLOOD PRESSURE: 75 MMHG

## 2021-04-16 LAB
GLUCOSE BLDC GLUCOMTR-MCNC: 369 MG/DL (ref 70–99)
UPPER GI ENDOSCOPY: NORMAL

## 2021-04-16 PROCEDURE — 43235 EGD DIAGNOSTIC BRUSH WASH: CPT

## 2021-04-16 RX ORDER — ONDANSETRON 2 MG/ML
4 INJECTION INTRAMUSCULAR; INTRAVENOUS EVERY 6 HOURS PRN
Status: DISCONTINUED | OUTPATIENT
Start: 2021-04-16 | End: 2021-04-17 | Stop reason: HOSPADM

## 2021-04-16 RX ORDER — SIMETHICONE
LIQUID (ML) MISCELLANEOUS PRN
Status: DISCONTINUED | OUTPATIENT
Start: 2021-04-16 | End: 2021-04-16 | Stop reason: HOSPADM

## 2021-04-16 RX ORDER — PROCHLORPERAZINE MALEATE 10 MG
10 TABLET ORAL EVERY 6 HOURS PRN
Status: DISCONTINUED | OUTPATIENT
Start: 2021-04-16 | End: 2021-04-17 | Stop reason: HOSPADM

## 2021-04-16 RX ORDER — FENTANYL CITRATE 50 UG/ML
INJECTION, SOLUTION INTRAMUSCULAR; INTRAVENOUS PRN
Status: DISCONTINUED | OUTPATIENT
Start: 2021-04-16 | End: 2021-04-16 | Stop reason: HOSPADM

## 2021-04-16 RX ORDER — NALOXONE HYDROCHLORIDE 0.4 MG/ML
0.2 INJECTION, SOLUTION INTRAMUSCULAR; INTRAVENOUS; SUBCUTANEOUS
Status: DISCONTINUED | OUTPATIENT
Start: 2021-04-16 | End: 2021-04-17 | Stop reason: HOSPADM

## 2021-04-16 RX ORDER — ONDANSETRON 2 MG/ML
4 INJECTION INTRAMUSCULAR; INTRAVENOUS
Status: DISCONTINUED | OUTPATIENT
Start: 2021-04-16 | End: 2021-04-16 | Stop reason: HOSPADM

## 2021-04-16 RX ORDER — ONDANSETRON 4 MG/1
4 TABLET, ORALLY DISINTEGRATING ORAL EVERY 6 HOURS PRN
Status: DISCONTINUED | OUTPATIENT
Start: 2021-04-16 | End: 2021-04-17 | Stop reason: HOSPADM

## 2021-04-16 RX ORDER — NALOXONE HYDROCHLORIDE 0.4 MG/ML
0.4 INJECTION, SOLUTION INTRAMUSCULAR; INTRAVENOUS; SUBCUTANEOUS
Status: DISCONTINUED | OUTPATIENT
Start: 2021-04-16 | End: 2021-04-17 | Stop reason: HOSPADM

## 2021-04-16 RX ORDER — LIDOCAINE 40 MG/G
CREAM TOPICAL
Status: DISCONTINUED | OUTPATIENT
Start: 2021-04-16 | End: 2021-04-16 | Stop reason: HOSPADM

## 2021-04-16 RX ORDER — FLUMAZENIL 0.1 MG/ML
0.2 INJECTION, SOLUTION INTRAVENOUS
Status: ACTIVE | OUTPATIENT
Start: 2021-04-16 | End: 2021-04-16

## 2021-04-16 ASSESSMENT — MIFFLIN-ST. JEOR: SCORE: 1409.08

## 2021-04-17 ENCOUNTER — HEALTH MAINTENANCE LETTER (OUTPATIENT)
Age: 61
End: 2021-04-17

## 2021-10-02 ENCOUNTER — HEALTH MAINTENANCE LETTER (OUTPATIENT)
Age: 61
End: 2021-10-02

## 2022-05-08 ENCOUNTER — HEALTH MAINTENANCE LETTER (OUTPATIENT)
Age: 62
End: 2022-05-08

## 2023-01-14 ENCOUNTER — HEALTH MAINTENANCE LETTER (OUTPATIENT)
Age: 63
End: 2023-01-14

## 2023-06-02 ENCOUNTER — HEALTH MAINTENANCE LETTER (OUTPATIENT)
Age: 63
End: 2023-06-02

## 2023-06-19 NOTE — PROGRESS NOTES
[facility]    Hepatology follow-up    Follow-up visit for cirrhosis    Subjective:  59 year old male    Cirrhosis  - ETOH  - last ETOH ~2 months ago  - hx ascites, on Bumex and triamterene (gynecomastia on spironolactone)  - hx HE, on rifaximin   - no hx variceal bleed  - last EGD Dec 2016- gd I varices, portal HTN gastropathy  - HCC screening- Abd U/S 3/4/2020     The patient comes to clinic this morning with his wife for follow-up of cirrhosis.  Last clinic visit 12/2017.  The patient was on a course of amoxicillin in 12/2019 after a root canal.  He denies any ER visits or hospital admissions since last clinic visit.      The patient is doing okay today.  He notes some mild dyspnea on exertion.  He denies any chest pain, palpitations or syncope.  The patient denies any symptoms specific to liver disease.      The patient denies jaundice, lower extremity edema, abdominal distention, lethargy or confusion.      The patient denies melena, hematemesis or hematochezia.      No history of fevers, sweats or chills.  The patient had an influenza vaccination earlier this winter.      Weight has been stable.  Appetite is good.      The patient continues to have issues with his glycemic control.  His blood sugars average at least 200.      The patient last drank alcohol 2 weeks ago when he was in [place].  He reports that he has significantly reduced his alcohol use in 2.25 years since he was last in clinic.  Prior to his trip to [facility], he had not drank alcohol for several months.       Medical hx Surgical hx   Past Medical History:   Diagnosis Date     Cirrhosis of liver (H)     ETOH     Depression      Osteoporosis      Polyps, colonic       Past Surgical History:   Procedure Laterality Date     ESOPHAGOSCOPY, GASTROSCOPY, DUODENOSCOPY (EGD), COMBINED  11/2/2012    Procedure: COMBINED ESOPHAGOSCOPY, GASTROSCOPY, DUODENOSCOPY (EGD);;  Surgeon: Darell Stewart MD;  Location: [facility]      ESOPHAGOSCOPY, GASTROSCOPY, DUODENOSCOPY (EGD), COMBINED  12/6/2013    Procedure: COMBINED ESOPHAGOSCOPY, GASTROSCOPY, DUODENOSCOPY (EGD);;  Surgeon: Darell Stewart MD;  Location:  GI     ESOPHAGOSCOPY, GASTROSCOPY, DUODENOSCOPY (EGD), COMBINED N/A 12/17/2014    Procedure: COMBINED ESOPHAGOSCOPY, GASTROSCOPY, DUODENOSCOPY (EGD), BIOPSY SINGLE OR MULTIPLE;  Surgeon: Philippe Zhu MD;  Location:  GI     ESOPHAGOSCOPY, GASTROSCOPY, DUODENOSCOPY (EGD), COMBINED N/A 12/22/2016    Procedure: COMBINED ESOPHAGOSCOPY, GASTROSCOPY, DUODENOSCOPY (EGD);  Surgeon: Rich Haque MD;  Location:  GI     HC UGI ENDOSCOPY W EUS Left 5/4/2015    Procedure: COMBINED ENDOSCOPIC ULTRASOUND, ESOPHAGOSCOPY, GASTROSCOPY, DUODENOSCOPY (EGD);  Surgeon: Tremaine Sanches MD;  Location:  GI     HERNIORRHAPHY INGUINAL      bilateral repair     ORTHOPEDIC SURGERY      fx left leg, left hip      ORTHOPEDIC SURGERY      fx right ankle and right arm          Medications  Prior to Admission medications    Medication Sig Start Date End Date Taking? Authorizing Provider   alendronate (FOSAMAX) 70 MG tablet Take 70 mg by mouth every 7 days   Yes Reported, Patient   aMILoride (MIDAMOR) 5 MG tablet Take 2 tablets (10 mg) by mouth daily  Patient taking differently: Take 5 mg by mouth 2 times daily  6/12/17  Yes Rich Haque MD   blood glucose (NO BRAND SPECIFIED) lancets standard Use to test blood sugar 4 times daily or as directed. 4/23/17  Yes Natanael Rios MD   blood glucose monitoring (ACCU-CHEK ROBERTO) test strip Use to test blood sugars 4 times daily or as directed. 4/23/17  Yes Natanael Rios MD   bumetanide (BUMEX) 1 MG tablet Take 1 tablet (1 mg) by mouth daily 3/12/18  Yes Rich Haque MD   FLUoxetine (PROZAC) 40 MG capsule Take 40 mg by mouth daily 1/22/20  Yes Reported, Patient   insulin glargine (LANTUS) 100 UNIT/ML injection Inject 22 Units Subcutaneous At  Bedtime  Patient taking differently: Inject 26 Units Subcutaneous At Bedtime  4/23/17  Yes Natanael Rios MD   insulin lispro (HUMALOG KWIKPEN) 100 UNIT/ML injection Inject Subcutaneous 3 times daily (before meals) sliding scale   Yes Reported, Patient   Multiple Vitamins-Minerals (MULTIVITAL) TABS Take 1 tablet by mouth 11/19/15  Yes Rich Haque MD   omeprazole (PRILOSEC) 20 MG CR capsule Take 1 capsule (20 mg) by mouth daily 4/3/18  Yes Rich Haque MD   potassium chloride SA (K-DUR/KLOR-CON M) 20 MEQ CR tablet Take 1 tablet (20 mEq) by mouth 2 times daily  Patient taking differently: Take 20 mEq by mouth daily  6/20/18  Yes Rich Haque MD   Sharps Container MISC 1 Units 4 times daily as needed 4/23/17  Yes Natanael Rios MD   XIFAXAN 550 MG TABS tablet TAKE 1 TABLET BY MOUTH TWICE DAILY. 11/4/19  Yes Rich Haque MD   amylase-lipase-protease (CREON 24) 98403 UNITS CPEP per EC capsule Take 1 capsule (24,000 Units) by mouth 3 times daily (with meals) Take 2-3 with meals, and 1-2 with snacks. Not to exceed 15/day 6/16/17   Guru Doron Barnett MD   FLUoxetine 20 MG tablet Take 20 mg by mouth daily    Reported, Patient   lactulose (CHRONULAC) 10 GM/15ML solution Take 20 g by mouth 2 times daily    Unknown, Entered By History       Allergies  Allergies   Allergen Reactions     Atorvastatin Calcium      No Clinical Screening - See Comments      Platelets ( infusion)       Review of systems  A 10-point review of systems was negative    Examination  /84 (BP Location: Right arm, Patient Position: Sitting, Cuff Size: Adult Regular)   Pulse 70   Temp 98  F (36.7  C) (Oral)   Wt 72.2 kg (159 lb 3.2 oz)   SpO2 97%   BMI 27.33 kg/m    Body mass index is 27.33 kg/m .    Gen- well, NAD, A+Ox3, normal color  CVS- end systolic murmur with radiation to carotids, no added sounds, RRR  RS- CTA  Abd- central obesity, soft, non-tender, palpable  hepatomegaly, no ascites or splenomegaly on palpation or percussion, BS+  Extr- hands normal, no JOHN  Skin- telangiectasia on chest wall, no jaundice  Neuro- no asterixis  Psych- normal mood    Laboratory  Lab Results   Component Value Date     03/04/2020    POTASSIUM 3.8 03/04/2020    CHLORIDE 108 03/04/2020    CO2 28 03/04/2020    BUN 13 03/04/2020    CR 0.90 03/04/2020       Lab Results   Component Value Date    BILITOTAL 1.2 03/04/2020    ALT 38 03/04/2020    AST 36 03/04/2020    ALKPHOS 118 03/04/2020       Lab Results   Component Value Date    ALBUMIN 2.4 03/04/2020    PROTTOTAL 6.6 03/04/2020        Lab Results   Component Value Date    WBC 6.6 03/04/2020    HGB 13.2 03/04/2020    MCV 98 03/04/2020     03/04/2020       Lab Results   Component Value Date    INR 1.27 03/04/2020       Radiology  Abd U/S 3/4/2020 reviewed    Assessment  59-year-old male who presents for follow-up of alcoholic cirrhosis complicated with history of ascites and hepatic encephalopathy.  MELD-Na= 10 (stable).  Last ETOH= 02/2020.  No evidence of ascites on current diuretics.  Hepatic encephalopathy well-controlled on rifaximin alone.  Would recommend obtaining echocardiogram to evaluate for aortic stenosis in context of heart murmur and dyspnea on exertion.  Up to date with HCC screening.  Overdue for surveillance of esophageal varices.     Plan  1.  Complete abstinence from alcohol  2.  Low Na diet  3.  Continue amiloride and bumetanide at current doses  4.  EGD  5.  Follow-up in 6 months    Rich Little MD  Hepatology  [facility]   Yes

## (undated) DEVICE — SUCTION MANIFOLD NEPTUNE 2 SYS 1 PORT 702-025-000

## (undated) DEVICE — SYR 30ML SLIP TIP W/O NDL 302833

## (undated) DEVICE — SUCTION CATH AIRLIFE TRI-FLO W/CONTROL PORT 14FR  T60C

## (undated) DEVICE — GLOVE EXAM NITRILE LG PF LATEX FREE 5064

## (undated) DEVICE — KIT ENDO TURNOVER/PROCEDURE CARRY-ON 101822

## (undated) DEVICE — TUBING SUCTION 12"X1/4" N612

## (undated) DEVICE — ENDO BITE BLOCK ADULT OMNI-BLOC

## (undated) DEVICE — SOL WATER IRRIG 500ML BOTTLE 2F7113

## (undated) DEVICE — GOWN IMPERVIOUS 2XL BLUE

## (undated) RX ORDER — FENTANYL CITRATE 50 UG/ML
INJECTION, SOLUTION INTRAMUSCULAR; INTRAVENOUS
Status: DISPENSED
Start: 2021-04-16